# Patient Record
Sex: MALE | Race: WHITE | NOT HISPANIC OR LATINO | Employment: OTHER | URBAN - METROPOLITAN AREA
[De-identification: names, ages, dates, MRNs, and addresses within clinical notes are randomized per-mention and may not be internally consistent; named-entity substitution may affect disease eponyms.]

---

## 2017-04-03 ENCOUNTER — DOCUMENTATION ONLY (OUTPATIENT)
Dept: REHABILITATION | Facility: HOSPITAL | Age: 62
End: 2017-04-03

## 2017-04-03 NOTE — PROGRESS NOTES
Physical Therapy DC     Name: Jose Luis Rendon  [facility] Number: [id number]  Diagnosis: Bell's Palsy, dizziness, Vestibulopathy  Physician: Jet    Treatment Orders: PT Eval and Treat  Past Medical History:   Diagnosis Date    Bell's palsy     Pneumonia     age 5    Psoriasis        Pt was evaluated 8/29/16 and followed for vestibular rehab for 6 visits. Pt was also seeing speech therapy due to [place] palsy and facial weakness.  Pt was scheduled for reassessment following HEP on 1/6/17 cancelled due to insurance authorization and rescheduled for 1/25/17 and this was No show- pt has not contacted to resume or reschedule- pt was scheduled to be discharged at that last visit.       Status per last visit seen 11/30/16:  AWAN Assessment    1. Sitting to Standing   4 - able to stand without using hands and stabilize independently  2. Standing Unsupported   4 - able to stand safely 2 minutes without hold  3. Sitting Unsupported   4 - able to sit safely and securely 2 minutes  4. Standing to Sitting   4 - sits safely with minimal use of hands  5. Pivot Transfer   4 - able to trnasfer safely with minor use of hands  6. Standing with Eyes Closed   4 - albe to stand 10 seconds safely  7. Standing with Feet Together   4 - able to place feet together independently and stand 1 minute safely  8. Reaching Forward with Outstretched Arm   4 - can reach forward confidently 25 cm/10 inches  9. Retrieving Object from Floor   4 - able to  slipper safely and easily  10. Turning to Look Behind   4 - looks behind from both sides and weights shifts well  11. Turning 360 Degrees   4 - able to turn 360 in seconds or less  12. Placing Alternate Foot on Step   4 - able to stand independently safely and complete 8 steps in 20 seconds  13. Standing with One Foot in Front   3 - able to place foot ahead of other independently and hold 30 seconds  14. Standing on One Foot   4 - able to lift leg  independently and hold > 10 seconds  55/56, mild dizziness reported with turns, sway with additional testing of tandem, [place]omberg with eyes closed    Motion Provoked Symptoms  ( Intensity: 0-10 scale 0=no Sx, 10=severe Sx)       Intensity 11/30/16 Duration 11/30/16 Intensity  8/29/16 Duration  8/29/16   Baseline symptoms 2  2-3     Sitting - Supine 5 15 sec 5-6 30sec   Supine - L side 5 20 sec 8 15-20 sec   Supine - R side 4-5  5 sec 5 5 sec   Supine - Sitting 5-6  5 sec 3 5 sec   Left Hallpike nt  3 Negative for vertigo and nystagmus   Left Hallpike - Sitting   5     Right Hallpike   3 Negative for vertigo and nystagmus   Right Hallpike - Sitting   3     Sitting - Nose to Left Knee   nt     Sitting - Erect Left         Sitting - Nose to Right Knee         Sitting - Erect Left         Sitting - Head Rotation   5 15 sec   Sitting - Head flx/ext   5 15 sec   Standing - Turn to Right   mild     Standing - Turn to Left   mild        Still showing slow but consistent changes in balance control and vestibular function- pt also reporting slow but consistent changes in facial nerve return and acoustic nerve for hearing.    Although slow and still reporting some motion provoked symptoms pt is making progress with Vestibular Rehab.  Baseline hovers around 2/10 and does spike with challenges.Pt has been compliant with suggestions and is improving.   FOTO 42/100    STG'S: 4-6 weeks  1. Patient independent in HEP of balance, habituation, head-eye coordination and gaze stabilization.  Exercises to be done Daily. Met with progression  2. Decrease patient's subjective rating of dizziness to a 2 on 0-10 scale) with bed mobility and transfers. - varies may spike but does settle  3. Decrease patient's subjective rating of dizziness to a 2 (on 0-10 scale) or less as baseline. met     LTG'S : 6-12 weeks  1. Patient able to ambulate in community safely without assistive device, on varied terrainwith head movement, without LOB or c/o  dizziness.   2. Patient's subjective rating of dizziness will decreased to 0-2 on 0-10 scale  3. Patient able to perform head motions and amb with turns without LOB or c/o dizziness  4. Reduce risk of falls with [place]a Balance gains by 3-5 points.  5. Pt to be I with self management of condition and progression vestibular program for maintenance.    PLAN  Pt was being followed every 4-6 weeks for updates to vestibular programs.  Pt has not contacted to reschedule or resume care.   Discharge with ongoing HEP

## 2017-09-18 ENCOUNTER — HOSPITAL ENCOUNTER (OUTPATIENT)
Dept: RADIOLOGY | Facility: HOSPITAL | Age: 62
Discharge: HOME | End: 2017-09-18
Attending: FAMILY MEDICINE
Payer: COMMERCIAL

## 2017-09-18 ENCOUNTER — OFFICE VISIT (OUTPATIENT)
Dept: FAMILY MEDICINE | Facility: CLINIC | Age: 62
End: 2017-09-18
Payer: COMMERCIAL

## 2017-09-18 VITALS
OXYGEN SATURATION: 95 % | DIASTOLIC BLOOD PRESSURE: 84 MMHG | TEMPERATURE: 99 F | HEART RATE: 87 BPM | WEIGHT: 211.44 LBS | BODY MASS INDEX: 31.32 KG/M2 | SYSTOLIC BLOOD PRESSURE: 127 MMHG | HEIGHT: 69 IN

## 2017-09-18 DIAGNOSIS — J40 BRONCHITIS: ICD-10-CM

## 2017-09-18 DIAGNOSIS — L40.9 PSORIASIS: ICD-10-CM

## 2017-09-18 DIAGNOSIS — J40 BRONCHITIS: Primary | ICD-10-CM

## 2017-09-18 DIAGNOSIS — J30.2 CHRONIC SEASONAL ALLERGIC RHINITIS DUE TO OTHER ALLERGEN: ICD-10-CM

## 2017-09-18 PROCEDURE — 71020 XR CHEST PA AND LATERAL: CPT | Mod: TC

## 2017-09-18 PROCEDURE — 3008F BODY MASS INDEX DOCD: CPT | Mod: S$GLB,,, | Performed by: FAMILY MEDICINE

## 2017-09-18 PROCEDURE — 96372 THER/PROPH/DIAG INJ SC/IM: CPT | Mod: S$GLB,,, | Performed by: FAMILY MEDICINE

## 2017-09-18 PROCEDURE — 71020 XR CHEST PA AND LATERAL: CPT | Mod: 26,,, | Performed by: RADIOLOGY

## 2017-09-18 PROCEDURE — 99214 OFFICE O/P EST MOD 30 MIN: CPT | Mod: 25,S$GLB,, | Performed by: FAMILY MEDICINE

## 2017-09-18 PROCEDURE — 3074F SYST BP LT 130 MM HG: CPT | Mod: S$GLB,,, | Performed by: FAMILY MEDICINE

## 2017-09-18 PROCEDURE — 3079F DIAST BP 80-89 MM HG: CPT | Mod: S$GLB,,, | Performed by: FAMILY MEDICINE

## 2017-09-18 PROCEDURE — 99999 PR PBB SHADOW E&M-EST. PATIENT-LVL III: CPT | Mod: PBBFAC,,, | Performed by: FAMILY MEDICINE

## 2017-09-18 RX ORDER — TRIAMCINOLONE ACETONIDE 40 MG/ML
80 INJECTION, SUSPENSION INTRA-ARTICULAR; INTRAMUSCULAR
Status: COMPLETED | OUTPATIENT
Start: 2017-09-18 | End: 2017-09-18

## 2017-09-18 RX ORDER — CLOBETASOL PROPIONATE 0.5 MG/G
CREAM TOPICAL 2 TIMES DAILY PRN
Qty: 60 G | Refills: 2 | Status: SHIPPED | OUTPATIENT
Start: 2017-09-18 | End: 2022-01-01

## 2017-09-18 RX ORDER — AZITHROMYCIN 250 MG/1
TABLET, FILM COATED ORAL
Qty: 6 TABLET | Refills: 0 | Status: SHIPPED | OUTPATIENT
Start: 2017-09-18 | End: 2018-04-27

## 2017-09-18 RX ORDER — ALBUTEROL SULFATE 90 UG/1
2 AEROSOL, METERED RESPIRATORY (INHALATION) EVERY 6 HOURS PRN
Qty: 1 EACH | Refills: 2 | Status: SHIPPED | OUTPATIENT
Start: 2017-09-18 | End: 2018-06-26

## 2017-09-18 RX ADMIN — TRIAMCINOLONE ACETONIDE 80 MG: 40 INJECTION, SUSPENSION INTRA-ARTICULAR; INTRAMUSCULAR at 05:09

## 2017-09-18 NOTE — PATIENT INSTRUCTIONS
Start Z pack antibiotic, steroid shot in office.  Albuterol inhaler as needed. Chest xray to rule out pneumonia. ALLEGRA every day for nasal drip and allergy symptoms

## 2017-09-18 NOTE — PROGRESS NOTES
Office Visit    Patient Name: Jose Luis Rendon    : 1955  MRN: [id number]    Subjective:  Jose Luis is a 61 y.o. male who presents today for:    Nasal Congestion and Chest Congestion    62 yo with history of recurrent bronchitis and 2 episodes of previous pneumonia here with cough and congestion over the last week. Has some associated shortness of breath that is mildly worse with recent cough and feels that there is a rattling in his chest.  Has nasal congestion and post nasal drainage.  Had some chills over the weekend but no fever. His wife was sick with similar symptoms. Has not tried anything to help with symptoms.     Past Medical History  Past Medical History:   Diagnosis Date    Bell's palsy     Pneumonia     age 5    Psoriasis        Past Surgical History  Past Surgical History:   Procedure Laterality Date    BACK SURGERY N/A     bulging L5    TONSILLECTOMY         Family History  Family History   Problem Relation Age of Onset    Lung disease Mother     Glaucoma Mother     Cataracts Mother     Bladder Cancer Father     Alzheimer's disease Father     Glaucoma Father     Prostate cancer Father     Rheum arthritis Sister     Lupus Sister     Coronary artery disease Maternal Grandmother      nonpremature    Colon cancer Neg Hx        Social History  Social History     Social History    Marital status:      Spouse name: N/A    Number of children: N/A    Years of education: N/A     Occupational History    Not on file.     Social History Main Topics    Smoking status: Former Smoker     Packs/day: 1.00     Years: 45.00     Quit date: 2015    Smokeless tobacco: Not on file    Alcohol use 0.0 oz/week      Comment: rare    Drug use: No    Sexual activity: Yes     Partners: Female     Other Topics Concern    Not on file     Social History Narrative    Diet: no specific        Exercise: no           Current Medications  Medications reviewed and updated.     Allergies   Review of  "patient's allergies indicates:   Allergen Reactions    Bee sting [allergen ext-venom-honey bee] Anaphylaxis and Swelling       Review of Systems (Pertinent positives)  Review of Systems   Constitutional: Positive for chills. Negative for fever.   HENT: Positive for postnasal drip and sore throat.    Respiratory: Positive for cough and shortness of breath.    Gastrointestinal: Negative for nausea and vomiting.   Allergic/Immunologic: Positive for environmental allergies.       /84 (BP Location: Left arm, Patient Position: Sitting, BP Method: Large (Automatic))   Pulse 87   Temp 98.6 °F (37 °C)   Ht 5' 9" (1.753 m)   Wt 95.9 kg (211 lb 6.7 oz)   SpO2 95%   BMI 31.22 kg/m²     Physical Exam   Constitutional: He is oriented to person, place, and time. He appears well-developed and well-nourished. No distress.   Cardiovascular: Normal rate and regular rhythm.    Pulmonary/Chest: Effort normal. He has wheezes in the right upper field, the right middle field, the right lower field, the left upper field, the left middle field and the left lower field. He has rales in the left lower field.   Neurological: He is alert and oriented to person, place, and time.   Psychiatric: He has a normal mood and affect.   Vitals reviewed.        Assessment/Plan:  Jose Luis Rendon is a 61 y.o. male who presents today for :    Jose Luis was seen today for nasal congestion and chest congestion.    Diagnoses and all orders for this visit:    Bronchitis  Comments:  start z pack and chest xray to rule out pneumonia with recent shortness of breath and lung crackles.  albuterol inhaler as needed in office steroid shot  Orders:  -     azithromycin (ZITHROMAX Z-MICHEL) 250 MG tablet; Take 2 tablets on day one then 1 tablet for 4 more days  -     X-Ray Chest PA And Lateral; Future  -     albuterol 90 mcg/actuation inhaler; Inhale 2 puffs into the lungs every 6 (six) hours as needed for Wheezing or Shortness of Breath.  -     triamcinolone acetonide " "injection 80 mg; Inject 2 mLs (80 mg total) into the muscle one time.    Chronic seasonal allergic rhinitis due to other allergen    Psoriasis  -     clobetasol (TEMOVATE) 0.05 % cream; Apply topically 2 (two) times daily as needed. To inflamed areas of elbows            ICD-10-CM ICD-9-CM    1. Bronchitis J40 490 azithromycin (ZITHROMAX Z-MICHEL) 250 MG tablet      X-Ray Chest PA And Lateral      albuterol 90 mcg/actuation inhaler      triamcinolone acetonide injection 80 mg    start z pack and chest xray to rule out pneumonia with recent shortness of breath and lung crackles.  albuterol inhaler as needed in office steroid shot   2. Chronic seasonal allergic rhinitis due to other allergen J30.2     3. Psoriasis L40.9 696.1 clobetasol (TEMOVATE) 0.05 % cream     Return for return as needed for new concerns-  will call with chest xray results.     Addendum: Chest Xray result " There is minimal left basilar atelectasis.  No evidence of focal consolidative process, pneumothorax, or significant effusion.  Small calcified granuloma seen in the right lower lung zone. "  "

## 2017-09-19 ENCOUNTER — TELEPHONE (OUTPATIENT)
Dept: FAMILY MEDICINE | Facility: CLINIC | Age: 62
End: 2017-09-19

## 2017-09-19 NOTE — TELEPHONE ENCOUNTER
Called patient to review results: no pneumonia on lung xray.  The lung crackles that I heard are likely due to some chronic mild scarring/inflammation that he has as a result of his previous pneumonia episodes. He should take the Zithromax prescribed and used the inhaler as needed, but hopefully he is already starting to feel a little better with the steroid shot he got in clinic for his bronchitis-- bronchitis does not really show up on chest xray.-- Left a message requesting a call back.

## 2017-09-19 NOTE — TELEPHONE ENCOUNTER
Returned patient's call and reviewed Xray results, he verbalized understanding. He also stated the inhaler isn't covered by his insurance. He is going to call the pharmacy and find out which one his plan will cover. He will call us back and let us know.

## 2017-09-19 NOTE — TELEPHONE ENCOUNTER
----- Message from Sonia Valerio sent at 9/19/2017  3:02 PM CDT -----  Contact: [phone]/self  Patient called in returning your call. Please advise.

## 2018-03-23 ENCOUNTER — TELEPHONE (OUTPATIENT)
Dept: ADMINISTRATIVE | Facility: HOSPITAL | Age: 63
End: 2018-03-23

## 2018-03-23 RX ORDER — ASPIRIN 81 MG/1
81 TABLET ORAL DAILY
Refills: 0 | COMMUNITY
Start: 2018-03-23 | End: 2018-04-27 | Stop reason: SDUPTHER

## 2018-03-23 NOTE — TELEPHONE ENCOUNTER
Noted, put asa on his med list. Ok with seeing neuro again per msg. I haven't seen him in about 1.5 years. Would need rpt appt if there is anything he is asking me to evaluate.

## 2018-03-23 NOTE — TELEPHONE ENCOUNTER
Phoned pt to confirm ASA therapy, states takes it, but not regularly--pt then c/o continued sx from an event that happened 2 years ago.  States still having headaches, dizziness, vocal cord problems, and some facial muscle weakness, unable to mow his lawn.  Requested an appt with Dr Galvan.  Appt scheduled to see neurology. Asked that inform you of his continued sx

## 2018-04-04 ENCOUNTER — OFFICE VISIT (OUTPATIENT)
Dept: NEUROLOGY | Facility: CLINIC | Age: 63
End: 2018-04-04
Payer: COMMERCIAL

## 2018-04-04 VITALS
BODY MASS INDEX: 31.9 KG/M2 | WEIGHT: 215.38 LBS | HEART RATE: 91 BPM | HEIGHT: 69 IN | DIASTOLIC BLOOD PRESSURE: 99 MMHG | SYSTOLIC BLOOD PRESSURE: 155 MMHG

## 2018-04-04 DIAGNOSIS — F33.1 MODERATE EPISODE OF RECURRENT MAJOR DEPRESSIVE DISORDER: ICD-10-CM

## 2018-04-04 DIAGNOSIS — H81.10 BENIGN PAROXYSMAL POSITIONAL VERTIGO, UNSPECIFIED LATERALITY: ICD-10-CM

## 2018-04-04 DIAGNOSIS — R42 VERTIGO: Primary | ICD-10-CM

## 2018-04-04 PROCEDURE — 99214 OFFICE O/P EST MOD 30 MIN: CPT | Mod: S$GLB,,, | Performed by: PSYCHIATRY & NEUROLOGY

## 2018-04-04 PROCEDURE — 3080F DIAST BP >= 90 MM HG: CPT | Mod: CPTII,S$GLB,, | Performed by: PSYCHIATRY & NEUROLOGY

## 2018-04-04 PROCEDURE — 99999 PR PBB SHADOW E&M-EST. PATIENT-LVL III: CPT | Mod: PBBFAC,,, | Performed by: PSYCHIATRY & NEUROLOGY

## 2018-04-04 PROCEDURE — 3077F SYST BP >= 140 MM HG: CPT | Mod: CPTII,S$GLB,, | Performed by: PSYCHIATRY & NEUROLOGY

## 2018-04-04 RX ORDER — MECLIZINE HYDROCHLORIDE 25 MG/1
25 TABLET ORAL 3 TIMES DAILY PRN
Qty: 30 TABLET | Refills: 3 | Status: SHIPPED | OUTPATIENT
Start: 2018-04-04 | End: 2020-05-12

## 2018-04-04 NOTE — PATIENT INSTRUCTIONS
Depression Affects Your Mind and Body  Everyone feels sad or blue from time to time for a few days or weeks. Depression is when these feelings don't go away and they interfere with daily life.  Depression is a real illness that can develop at any age. It is one of the most common mental health problems in the [place] Depression makes you feel sad, helpless, and hopeless. It gets in the way of your life and relationships. It inhibits your ability to think and act. But, with help, you can feel better again.     When I was depressed, I felt awful. I was so tired all the time I could hardly think, but at night I couldnt fall asleep. My head hurt. My stomach hurt. I didnt know what was wrong with me.   Depression affects your whole body  Brain chemicals affect your body as well as your mood. So depression may do more than just make you feel low. You may also feel bad physically. Depression can:  · Cause trouble with mental tasks such as remembering, concentrating, or making decisions  · Make you feel nervous and jumpy  · Cause trouble sleeping. Or you may sleep too much  · Change your appetite  · Cause headaches, stomachaches, or other aches and pains  · Drain your body of energy  Depression and other illness  It is common for people who have chronic health problems to also have depression. It can often be hard to tell which one caused the other. A person might become depressed after finding out they have a health problem. But some studies suggest being depressed may make certain health problems more likely. And some depressed people stop taking care of themselves. This may make them more likely to get sick.  Date Last Reviewed: 1/1/2017 © 2000-2017 [employer] [address] [place] [address], [place] [address]. All rights reserved. This information is not intended as a substitute for professional medical care. Always follow your healthcare professional's instructions.

## 2018-04-04 NOTE — PROGRESS NOTES
[facility] NEUROLOGY  [facility] [place]    Date: April 4, 2018   Patient Name: Jose Luis Rendon   MRN: [id number]   PCP: Deric Armstrong  Referring Provider: No ref. provider found    Assessment:      This is Jose Luis Rendon, 62 y.o. 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  Have encouraged patient to resume PT, which he states he will do.  He will continue to use meclizine as needed for symptomatic control.  Spends the majority of the visit discussing the need for the patient to meet with a counselor.  Given his financial concerns, I have provided him with a list of local low and no cost community options for counseling, which the patient states he will look into. He denies SI/HI     Plan:      Problem List Items Addressed This Visit        Psychiatric    Major depressive disorder with current active episode    Current Assessment & Plan     -- provided with list of community resources for counseling            ENT    BPPV (benign paroxysmal positional vertigo)    Current Assessment & Plan     -- resume meclizine PRN  -- re-eval with PT           Other Visit Diagnoses     Vertigo    -  Primary    Relevant Orders    Ambulatory consult to Physical Therapy        I spent a total of 30 minutes in face to face time with the patient, over half of which was spent on counseling and education about the patient's diagnosis and medications.     James Galvan MD  [facility]   Department of Neurology    Patient note was created using Dragon Dictation.  Any errors in syntax or even information may not have been identified and edited on initial review prior to signing this note.  Subjective:        HPI:   Mr. Jose Luis Rendon is a 62 y.o. male who presents with a chief complaint of Bell's palsy and [place] syndrome.  Since the patient's last visit, he continues to endorse intermittent vertigo made worse by looking down and improved by keeping still.  He did not  follow up with PT for vestibular rehabilitation and is no longer using meclizine for symptomatic control.  He additionally spends much of the visit discussing his uncontrolled depression.  He is pre-contemplative about speaking to a therapist, but admits to significant anhedonia, lack of motivation, low energy, and poor mood.    PAST MEDICAL HISTORY:  Past Medical History:   Diagnosis Date    Bell's palsy     Pneumonia     age 5    Psoriasis      PAST SURGICAL HISTORY:  Past Surgical History:   Procedure Laterality Date    BACK SURGERY N/A 1998    bulging L5    TONSILLECTOMY       CURRENT MEDS:  Current Outpatient Prescriptions   Medication Sig Dispense Refill    aspirin (ECOTRIN) 81 MG EC tablet Take 1 tablet (81 mg total) by mouth once daily.  0    bacitracin (AK-TRACIN) ophthalmic ointment   0    albuterol 90 mcg/actuation inhaler Inhale 2 puffs into the lungs every 6 (six) hours as needed for Wheezing or Shortness of Breath. 1 each 2    azithromycin (ZITHROMAX Z-MICHEL) 250 MG tablet Take 2 tablets on day one then 1 tablet for 4 more days 6 tablet 0    citalopram (CELEXA) 10 MG tablet Take 1 tablet (10 mg total) by mouth once daily. 30 tablet 2    clobetasol (TEMOVATE) 0.05 % cream Apply topically 2 (two) times daily as needed. To inflamed areas of elbows 60 g 2    meclizine (ANTIVERT) 25 mg tablet Take 1 tablet (25 mg total) by mouth 3 (three) times daily as needed for Dizziness. 30 tablet 3    pravastatin (PRAVACHOL) 20 MG tablet Take 1 tablet (20 mg total) by mouth once daily. 30 tablet 11    trazodone (DESYREL) 50 MG tablet Take 0.5 tablets (25 mg total) by mouth every evening. 30 tablet 5     No current facility-administered medications for this visit.      ALLERGIES:  Review of patient's allergies indicates:   Allergen Reactions    Bee sting [allergen ext-venom-honey bee] Anaphylaxis and Swelling       FAMILY HISTORY:  Family History   Problem Relation Age of Onset    Lung disease Mother      "Glaucoma Mother     Cataracts Mother     Bladder Cancer Father     Alzheimer's disease Father     Glaucoma Father     Prostate cancer Father     Rheum arthritis Sister     Lupus Sister     Coronary artery disease Maternal Grandmother      nonpremature    Colon cancer Neg Hx        SOCIAL HISTORY:  Social History   Substance Use Topics    Smoking status: Former Smoker     Packs/day: 1.00     Years: 45.00     Quit date: 12/31/2015    Smokeless tobacco: Not on file    Alcohol use 0.0 oz/week      Comment: rare     Review of Systems:  12 review of systems is negative except for the symptoms mentioned in HPI.      Objective:     Vitals:    04/04/18 0940   BP: (!) 155/99   Pulse: 91   Weight: 97.7 kg (215 lb 6.2 oz)   Height: 5' 9" (1.753 m)       General: NAD, well nourished   Eyes: mild tearing of R eye, discharge, no erythema   ENT: moist mucous membranes of the oral cavity, nares patent, positive Sherry-Hallpike to right  Neck: Supple, Full range of motion  Cardiovascular: Warm and well perfused, pulses equal and symmetrical  Lungs: Normal work of breathing, normal chest wall excursions  Skin: No rash, lesions, or breakdown   Psychiatry: Mood and affect are restricted and depressed  Abdomen: soft, non tender, non distended  Extremeties: No cyanosis, clubbing or edema.    Neurological   MENTAL STATUS: Alert and oriented to person, place, and time. Attention and concentration within normal limits. Speech without dysarthria, able to name and repeat without difficulty.   CRANIAL NERVES: Visual fields intact. PERRL. EOMI. Facial sensation intact. Face with right LMN pattern droop, blink now intact on R with some synkinesis Hearing mildly reduced on R. Full shoulder shrug bilaterally. Tongue protrudes midline   SENSORY: Sensation is intact to light touch throughout.    MOTOR: Normal bulk and tone.  5/5 deltoid, biceps, triceps, hand  bilaterally. 5/5 iliopsoas, knee extension/flexion, foot dorsi/plantarflexion " bilaterally.    CEREBELLAR/COORDINATION/GAIT: Gait steady with normal arm swing and stride length. Normal rapid alternating movements.

## 2018-04-18 ENCOUNTER — CLINICAL SUPPORT (OUTPATIENT)
Dept: REHABILITATION | Facility: HOSPITAL | Age: 63
End: 2018-04-18
Attending: PSYCHIATRY & NEUROLOGY
Payer: COMMERCIAL

## 2018-04-18 DIAGNOSIS — R42 CHRONIC VERTIGO: ICD-10-CM

## 2018-04-18 PROCEDURE — G8979 MOBILITY GOAL STATUS: HCPCS | Mod: CJ,PO

## 2018-04-18 PROCEDURE — G8978 MOBILITY CURRENT STATUS: HCPCS | Mod: CL,PO

## 2018-04-18 PROCEDURE — 97162 PT EVAL MOD COMPLEX 30 MIN: CPT | Mod: PO

## 2018-04-18 PROCEDURE — 97112 NEUROMUSCULAR REEDUCATION: CPT | Mod: PO

## 2018-04-18 NOTE — PLAN OF CARE
PHYSICAL THERAPY INITIAL EVALUATION     Date: 04/18/2018  Name: Jose Luis Rendon  [facility] Number: [id number]    Visit #: 1   Start Time:  900  Stop Time:  1015  Time in treatment: 75 minutes    Orders:    By Location/POS By Department    none James Galvan MD [facility] NEUROLOGY   Priority Start Date Expiration Date Referral Entered By   Routine 04/11/2018 12/31/2018 James Galvan MD   Visits Requested Visits Authorized Visits Completed Visits Scheduled   1 20 1 0   Procedure Information     Diagnosis   R42 (ICD-10-CM) - Vertigo         History   History of Present Illness: pt relates that April 2016 he awakened one day with a severe pain in the right ear.  A few days later he developed a right facial droop.  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  He also relates he gets LEMUS even with talking.    He is also has hypersensitivity to certain noise frequency.  He lost his job May 6, 2016.  He then started to have severe anxiety    Treatment Diagnosis:   1. Chronic vertigo         Past Medical History      Past Medical History:   Diagnosis Date    Bell's palsy     Pneumonia     age 5    Psoriasis        Allergies  Review of patient's allergies indicates:   Allergen Reactions    Bee sting [allergen ext-venom-honey bee] Anaphylaxis and Swelling       Current Medication list:   Current Outpatient Prescriptions on File Prior to Visit   Medication Sig Dispense Refill    albuterol 90 mcg/actuation inhaler Inhale 2 puffs into the lungs every 6 (six) hours as needed for Wheezing or Shortness of Breath. 1 each 2    aspirin (ECOTRIN) 81 MG EC tablet Take 1 tablet (81 mg total) by mouth once daily.  0    azithromycin (ZITHROMAX Z-MICHEL) 250 MG tablet Take 2 tablets on day one then 1 tablet for 4 more days 6 tablet 0    bacitracin (AK-TRACIN) ophthalmic ointment   0    citalopram (CELEXA) 10 MG tablet Take 1 tablet (10 mg total) by mouth once daily. 30 tablet  "2    clobetasol (TEMOVATE) 0.05 % cream Apply topically 2 (two) times daily as needed. To inflamed areas of elbows 60 g 2    meclizine (ANTIVERT) 25 mg tablet Take 1 tablet (25 mg total) by mouth 3 (three) times daily as needed for Dizziness. 30 tablet 3    pravastatin (PRAVACHOL) 20 MG tablet Take 1 tablet (20 mg total) by mouth once daily. 30 tablet 11    trazodone (DESYREL) 50 MG tablet Take 0.5 tablets (25 mg total) by mouth every evening. 30 tablet 5     No current facility-administered medications on file prior to visit.        Precautions: Standard, Fall , [place]    Prior Therapy: not in a few years but did have some for BPPV in 2016    Diagnostic Tests: MRI 2016 Impression         Small vessel disease without evidence of acute ischemic changes.  Old periventricular lacunar infarct on the right. Fluid identified in the mastoid region on the right.  No evidence of a vestibular schwannoma.  Small bilateral hippocampal cyst.         Living situation:   Work status: forced snf due to illness  Stairs are very problematic due to vertigo  Sports/Recreational Activities: no  Assistive devices/equipment no    Cultural, Spiritual, Developmental and Educational concerns: none  Abuse/Neglect, Nutritional concerns: none     Patient Therapy Goals: not be as dizzy    Subjective   Jose Luis Rendon states he has a hard time sleeping due to anxiety.  " Feels like brain is sloshing in the head" Feels like he is spinning  Current vertigo level: 0 sitting  At worst  7/10  How long has vertigo/dizziness been present ?  Vertigo began in 2016 when he had the shingles.  It has gotten worse since then.    What provokes it? Carrying anything heavy, sudden starts and stops, riding on bumpy road, moving eyes, head movement, body movement  What relieves it?  Sitting still  How long to episodes last?  Minutes to 30 minutes  Do you have headaches?  Occasionally  Occurs if there is pressure is placed on the posterior skull         "      Are they associated with the dizziness?  Not necessarily  Any visual problems other than wearing glasses? Right eye droop and dry eyes  Any loss of hearing: bilateral       Any tinnitus?  R  FALLS: How many in the last year?       no                  Does darkness effect dizziness:  yes  Do you get dizzy reading?  Somewhat due to eye movement             Using the computer?   yes                  Driving?  no  PAIN none currently      Objective   APPEARANCE UPON ARRIVAL:  62 y.o. White  male very anxious, right facial droop.  Hoarse voice, SOB  MENTATION:  AOC    POSTURE examination in standing:  normal    GAIT:  No deviation on straight path    CERVICAL ROM   Normal  Increases vertigo with cervical extension  OCULOMOTOR  Smooth Pursuit:  normal  Saccadic eye movements:  perhaps a slight right eye horizontal nystagmus with horizontal saccades.  Vertical saccades created dizziness no systagmus  Convergence/divergence:  normal  Vestibular Ocular Reflex: normal without nystagmus but c/o dizziness*  Gaze nystagmus   normal      VESTIBULAR  Head impulse    Dizziness no nystagmus  Head shake test  Dizziness no nystagmus  [place]- Hallpike:  Essentially normal bilaterally    CEREBELLAR SCREENS/ COORDINATION:   Heel to shin  RLE: normal   Heel to shin  LLE: normal   Rapid alternating movement RLE:normal  Rapid alternating movement LLE: normal   Rapid alternating movemen LUE: normal   Rapid alternating movement RUE: normal     Endurance Deficit:very LEMUS and  at rest-anxious    Treatment   EVALUATION COMPLETED  NEUROMUSCULAR RE-EDUCATION AND BALANCE COORDINATION TRAINING for  10 minutes to include:  Oculomotor exercises  Saccades :  Horizontal saccades one minute 3 times a day                      Vertical saccades one minute 3 times a day  Vestibular ocular reflex                      Horizontal one minute 3 times a day  Education   Patient was provided with a written copy of the above home exercises indicated in bold to  perform as tolerated   Exercises were reviewed and patient was able to demonstrate them prior to the end of the session.  Pt was instructed in ways to improve safety of home environment to prevent falls  All of the patients questions were answered  Goals of therapy, the roles of PT and PTA, and the need for compliance of appointments, attendance policy and HEP was discussed with patient .  Patient expressed understanding and agreement with above education.      No barriers to learning or social/cultural issues were identified that could hinder therapy.    Assessment   Pt with chronic debilitating vertigo which has been present for 2 years.  He has what appears to be a peripheral uncompensated right vestibulopathy.  He will benefit from oculomotor exercises and balance exercises to retrain to improve overall balance and decrease sense of imbalance.  Pt presents with  evolving clinical presentation with changing clinical characteristics which include: difficulty with ADLs, employment, home life, leisure pursuits due to chronic vertigo        Medical necessity is demonstrated by the following IMPAIRMENTS/PROBLEM LIST:  1. Fall Risk - impaired balance   2. Unable to participate in daily activities   3  generalized weakness  4. Decreased CV endurance   5. Decreased community ambulation   6 oculomotor dysfunction    Anticipated barriers to physical therapy: anxiety    G Code   CMS Impairment/Limitation/Restriction for FOTO Vertigo Survey  Status Limitation G-Code CMS Severity Modifier  Intake 40% 60% Current Status CL - At least 60 percent but less than 80 percent  Predicted 63% 37% Goal Status+ CJ - At least 20 percent but less than 40 percent  D/C Status CL **only report if this is a one time visit    Pt's spiritual, cultural and educational needs considered and pt agreeable to plan of care and goals as stated below:       Goals     Short term goals: 8 weeks, pt agrees to goals set.  Pt and caregivers will evaluate  home for safety, including checking lighting and hazards on the floor such as rugs or cords and remove them.  Pt will tolerate 50 minutes of physical therapy treatment with 1 rest break to demonstrate overall improvement in CV endurance              Pt will report one degree less of vertigo and decreased frequency of vertigo 5/7 days.              Pt will report improved ability to work at his computer/spreadsheets    Rehab Potential: fair provided patient is compliant with HEP and PT appointments.      Plan   Pt will be seen 1-2 times per week for 8 weeks. Recommended Treatment Plan will include:  Manual soft tissue and/or joint mobilization  Therapeutic Exercise  Neuromuscular re-education          Individualized Home Exercise Program  Modalities: heat/ice, estim, US as needed         Pt education    PTA may be involved in patient's care as part of the Rehab Team.      History  Co-morbidities and personal factors that may impact the plan of care Examination  Body Structures and Functions, activity limitations and participation restrictions that may impact the plan of care Clinical Presentation   Co-morbidities: bells palsy with ey droop, anxiety, depression        Personal Factors:   coping style Body Regions: head, visual, vestibular, balance        Participation Restrictions:   anxiety     Activity limitations/ impairments:   Learning and applying knowledge  no deficits  General Tasks and Commands  no deficits  Communicationno deficits  Mobility:  Safe transitional movement/ ambulation impaired  Self care  no deficits  Domestic Life  Unable to participate fully due to fear of falling/ high fall potential  Interactions/Relationships:  No deficits   Community and Social Life:     Unable to participate fully due to    fear of falling/ high fall potential           evolving clinical presentation with changing clinical characteristics                      moderate   high high Decision Making/ Complexity  Score:  moderate     Pilar Corona, PT

## 2018-04-18 NOTE — PROGRESS NOTES
PHYSICAL THERAPY INITIAL EVALUATION     Date: 04/18/2018  Name: Jose Luis Rendon  [facility] Number: [id number]    Visit #: 1   Start Time:  900  Stop Time:  1015  Time in treatment: 75 minutes    Orders:    By Location/POS By Department    none James Galvan MD [facility] NEUROLOGY   Priority Start Date Expiration Date Referral Entered By   Routine 04/11/2018 12/31/2018 James Galvan MD   Visits Requested Visits Authorized Visits Completed Visits Scheduled   1 20 1 0   Procedure Information     Diagnosis   R42 (ICD-10-CM) - Vertigo         History   History of Present Illness: pt relates that April 2016 he awakened one day with a severe pain in the right ear.  A few days later he developed a right facial droop.  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  He also relates he gets LEMUS even with talking.    He is also has hypersensitivity to certain noise frequency.  He lost his job May 6, 2016.  He then started to have severe anxiety    Treatment Diagnosis:   1. Chronic vertigo         Past Medical History      Past Medical History:   Diagnosis Date    Bell's palsy     Pneumonia     age 5    Psoriasis        Allergies  Review of patient's allergies indicates:   Allergen Reactions    Bee sting [allergen ext-venom-honey bee] Anaphylaxis and Swelling       Current Medication list:   Current Outpatient Prescriptions on File Prior to Visit   Medication Sig Dispense Refill    albuterol 90 mcg/actuation inhaler Inhale 2 puffs into the lungs every 6 (six) hours as needed for Wheezing or Shortness of Breath. 1 each 2    aspirin (ECOTRIN) 81 MG EC tablet Take 1 tablet (81 mg total) by mouth once daily.  0    azithromycin (ZITHROMAX Z-MICHEL) 250 MG tablet Take 2 tablets on day one then 1 tablet for 4 more days 6 tablet 0    bacitracin (AK-TRACIN) ophthalmic ointment   0    citalopram (CELEXA) 10 MG tablet Take 1 tablet (10 mg total) by mouth once daily. 30 tablet  "2    clobetasol (TEMOVATE) 0.05 % cream Apply topically 2 (two) times daily as needed. To inflamed areas of elbows 60 g 2    meclizine (ANTIVERT) 25 mg tablet Take 1 tablet (25 mg total) by mouth 3 (three) times daily as needed for Dizziness. 30 tablet 3    pravastatin (PRAVACHOL) 20 MG tablet Take 1 tablet (20 mg total) by mouth once daily. 30 tablet 11    trazodone (DESYREL) 50 MG tablet Take 0.5 tablets (25 mg total) by mouth every evening. 30 tablet 5     No current facility-administered medications on file prior to visit.        Precautions: Standard, Fall , [place]    Prior Therapy: not in a few years but did have some for BPPV in 2016    Diagnostic Tests: MRI 2016 Impression         Small vessel disease without evidence of acute ischemic changes.  Old periventricular lacunar infarct on the right. Fluid identified in the mastoid region on the right.  No evidence of a vestibular schwannoma.  Small bilateral hippocampal cyst.         Living situation:   Work status: forced half-way due to illness  Stairs are very problematic due to vertigo  Sports/Recreational Activities: no  Assistive devices/equipment no    Cultural, Spiritual, Developmental and Educational concerns: none  Abuse/Neglect, Nutritional concerns: none     Patient Therapy Goals: not be as dizzy    Subjective   Jose Luis Rendon states he has a hard time sleeping due to anxiety.  " Feels like brain is sloshing in the head" Feels like he is spinning  Current vertigo level: 0 sitting  At worst  7/10  How long has vertigo/dizziness been present ?  Vertigo began in 2016 when he had the shingles.  It has gotten worse since then.    What provokes it? Carrying anything heavy, sudden starts and stops, riding on bumpy road, moving eyes, head movement, body movement  What relieves it?  Sitting still  How long to episodes last?  Minutes to 30 minutes  Do you have headaches?  Occasionally  Occurs if there is pressure is placed on the posterior skull         "      Are they associated with the dizziness?  Not necessarily  Any visual problems other than wearing glasses? Right eye droop and dry eyes  Any loss of hearing: bilateral       Any tinnitus?  R  FALLS: How many in the last year?       no                  Does darkness effect dizziness:  yes  Do you get dizzy reading?  Somewhat due to eye movement             Using the computer?   yes                  Driving?  no  PAIN none currently      Objective   APPEARANCE UPON ARRIVAL:  62 y.o. White  male very anxious, right facial droop.  Hoarse voice, SOB  MENTATION:  AOC    POSTURE examination in standing:  normal    GAIT:  No deviation on straight path    CERVICAL ROM   Normal  Increases vertigo with cervical extension  OCULOMOTOR  Smooth Pursuit:  normal  Saccadic eye movements:  perhaps a slight right eye horizontal nystagmus with horizontal saccades.  Vertical saccades created dizziness no systagmus  Convergence/divergence:  normal  Vestibular Ocular Reflex: normal without nystagmus but c/o dizziness*  Gaze nystagmus   normal      VESTIBULAR  Head impulse    Dizziness no nystagmus  Head shake test  Dizziness no nystagmus  [place]- Hallpike:  Essentially normal bilaterally    CEREBELLAR SCREENS/ COORDINATION:   Heel to shin  RLE: normal   Heel to shin  LLE: normal   Rapid alternating movement RLE:normal  Rapid alternating movement LLE: normal   Rapid alternating movemen LUE: normal   Rapid alternating movement RUE: normal     Endurance Deficit:very LEMUS and  at rest-anxious    Treatment   EVALUATION COMPLETED  NEUROMUSCULAR RE-EDUCATION AND BALANCE COORDINATION TRAINING for  10 minutes to include:  Oculomotor exercises  Saccades :  Horizontal saccades one minute 3 times a day                      Vertical saccades one minute 3 times a day  Vestibular ocular reflex                      Horizontal one minute 3 times a day  Education   Patient was provided with a written copy of the above home exercises indicated in bold to  perform as tolerated   Exercises were reviewed and patient was able to demonstrate them prior to the end of the session.  Pt was instructed in ways to improve safety of home environment to prevent falls  All of the patients questions were answered  Goals of therapy, the roles of PT and PTA, and the need for compliance of appointments, attendance policy and HEP was discussed with patient .  Patient expressed understanding and agreement with above education.      No barriers to learning or social/cultural issues were identified that could hinder therapy.    Assessment   Pt with chronic debilitating vertigo which has been present for 2 years.  He has what appears to be a peripheral uncompensated right vestibulopathy.  He will benefit from oculomotor exercises and balance exercises to retrain to improve overall balance and decrease sense of imbalance.  Pt presents with  evolving clinical presentation with changing clinical characteristics which include: difficulty with ADLs, employment, home life, leisure pursuits due to chronic vertigo        Medical necessity is demonstrated by the following IMPAIRMENTS/PROBLEM LIST:  1. Fall Risk - impaired balance   2. Unable to participate in daily activities   3  generalized weakness  4. Decreased CV endurance   5. Decreased community ambulation   6 oculomotor dysfunction    Anticipated barriers to physical therapy: anxiety    G Code   CMS Impairment/Limitation/Restriction for FOTO Vertigo Survey  Status Limitation G-Code CMS Severity Modifier  Intake 40% 60% Current Status CL - At least 60 percent but less than 80 percent  Predicted 63% 37% Goal Status+ CJ - At least 20 percent but less than 40 percent  D/C Status CL **only report if this is a one time visit    Pt's spiritual, cultural and educational needs considered and pt agreeable to plan of care and goals as stated below:       Goals     Short term goals: 8 weeks, pt agrees to goals set.  Pt and caregivers will evaluate  home for safety, including checking lighting and hazards on the floor such as rugs or cords and remove them.  Pt will tolerate 50 minutes of physical therapy treatment with 1 rest break to demonstrate overall improvement in CV endurance              Pt will report one degree less of vertigo and decreased frequency of vertigo 5/7 days.              Pt will report improved ability to work at his computer/spreadsheets    Rehab Potential: fair provided patient is compliant with HEP and PT appointments.      Plan   Pt will be seen 1-2 times per week for 8 weeks. Recommended Treatment Plan will include:  Manual soft tissue and/or joint mobilization  Therapeutic Exercise  Neuromuscular re-education          Individualized Home Exercise Program  Modalities: heat/ice, estim, US as needed         Pt education    PTA may be involved in patient's care as part of the Rehab Team.      History  Co-morbidities and personal factors that may impact the plan of care Examination  Body Structures and Functions, activity limitations and participation restrictions that may impact the plan of care Clinical Presentation   Co-morbidities: bells palsy with ey droop, anxiety, depression        Personal Factors:   coping style Body Regions: head, visual, vestibular, balance        Participation Restrictions:   anxiety     Activity limitations/ impairments:   Learning and applying knowledge  no deficits  General Tasks and Commands  no deficits  Communicationno deficits  Mobility:  Safe transitional movement/ ambulation impaired  Self care  no deficits  Domestic Life  Unable to participate fully due to fear of falling/ high fall potential  Interactions/Relationships:  No deficits   Community and Social Life:     Unable to participate fully due to    fear of falling/ high fall potential           evolving clinical presentation with changing clinical characteristics                      moderate   high high Decision Making/ Complexity  Score:  moderate     Pilar Corona, PT

## 2018-04-27 ENCOUNTER — OFFICE VISIT (OUTPATIENT)
Dept: FAMILY MEDICINE | Facility: CLINIC | Age: 63
End: 2018-04-27
Payer: COMMERCIAL

## 2018-04-27 VITALS
HEART RATE: 87 BPM | TEMPERATURE: 98 F | BODY MASS INDEX: 32.13 KG/M2 | OXYGEN SATURATION: 95 % | WEIGHT: 216.94 LBS | HEIGHT: 69 IN | DIASTOLIC BLOOD PRESSURE: 94 MMHG | SYSTOLIC BLOOD PRESSURE: 160 MMHG

## 2018-04-27 DIAGNOSIS — R06.09 DOE (DYSPNEA ON EXERTION): ICD-10-CM

## 2018-04-27 DIAGNOSIS — M54.50 ACUTE BILATERAL LOW BACK PAIN WITHOUT SCIATICA: ICD-10-CM

## 2018-04-27 DIAGNOSIS — B02.21 RAMSAY HUNT SYNDROME (GENICULATE HERPES ZOSTER): ICD-10-CM

## 2018-04-27 DIAGNOSIS — L40.9 PSORIASIS: ICD-10-CM

## 2018-04-27 DIAGNOSIS — I10 ESSENTIAL HYPERTENSION: Primary | ICD-10-CM

## 2018-04-27 DIAGNOSIS — G51.0 BELL'S PALSY: ICD-10-CM

## 2018-04-27 DIAGNOSIS — I10 BENIGN ESSENTIAL HTN: ICD-10-CM

## 2018-04-27 DIAGNOSIS — Z86.73 OLD LACUNAR STROKE WITHOUT LATE EFFECT: ICD-10-CM

## 2018-04-27 DIAGNOSIS — R42 CHRONIC VERTIGO: ICD-10-CM

## 2018-04-27 DIAGNOSIS — F41.9 ANXIETY: ICD-10-CM

## 2018-04-27 PROCEDURE — 3074F SYST BP LT 130 MM HG: CPT | Mod: CPTII,S$GLB,, | Performed by: FAMILY MEDICINE

## 2018-04-27 PROCEDURE — 99215 OFFICE O/P EST HI 40 MIN: CPT | Mod: S$GLB,,, | Performed by: FAMILY MEDICINE

## 2018-04-27 PROCEDURE — 99999 PR PBB SHADOW E&M-EST. PATIENT-LVL IV: CPT | Mod: PBBFAC,,, | Performed by: FAMILY MEDICINE

## 2018-04-27 PROCEDURE — 3080F DIAST BP >= 90 MM HG: CPT | Mod: CPTII,S$GLB,, | Performed by: FAMILY MEDICINE

## 2018-04-27 RX ORDER — ASPIRIN 81 MG/1
81 TABLET ORAL DAILY
Refills: 0 | COMMUNITY
Start: 2018-04-27 | End: 2022-01-01 | Stop reason: SDUPTHER

## 2018-04-27 RX ORDER — AMLODIPINE BESYLATE 5 MG/1
5 TABLET ORAL DAILY
Qty: 30 TABLET | Refills: 11 | Status: SHIPPED | OUTPATIENT
Start: 2018-04-27 | End: 2019-05-06 | Stop reason: SDUPTHER

## 2018-04-27 RX ORDER — PRAVASTATIN SODIUM 20 MG/1
20 TABLET ORAL DAILY
Qty: 30 TABLET | Refills: 11 | Status: SHIPPED | OUTPATIENT
Start: 2018-04-27 | End: 2019-05-06 | Stop reason: SDUPTHER

## 2018-04-27 NOTE — PROGRESS NOTES
"(Portions of this note were dictated using voice recognition software and may contain dictation related errors in spelling/grammar/syntax not found on text review)    CC:   Chief Complaint   Patient presents with    Dizziness    Back Pain    Medication Management     wants to discuss inhalers.  Also states Antivert made him "more dizzy"       HPI: 62 y.o. male Last seen by me in November 2016.   Several concerns:    Bell's palsy in 2016 with consideration of Ivania White syndrome..  Workup had demonstrated no acute stroke that there was a remote lacunar infarct on the right.  Was started on pravastatin but had admitted at that visit that he was not regularly taking this.  Counseling provided at that visit.  He still mentions that he never really took the pravastatin regularly.  Supposed to be on aspirin but not currently taking this.  Sometimes get some bruising when his dog scratches him.  No other abnormal bleeding    Continues to get vertigo issues.  Visit with neurology, note appreciated.  Was referred to physical therapy to help with his vertigo.  Was advised meclizine as needed.  Finds sometimes when he takes a 25 mg meclizine he almost feels more dizzy.    Has significant anxiety and insomnia resulting from this diagnosis and resulting loss of work.  Started on trazodone at bedtime and Celexa during the day.  Not currently taking these.  Does get frustrated about his current predicament.  He states he used to be very active with work and now is not working, he is much more sedentary.  He does have feelings of guilt and has difficulty coming to terms with all the events that lead him to lose his job.  Financial difficulties are also apparent because of this.  His neurologist had recommended some low cost counseling solutions but he has not pursued this yet.    Hypertension diagnosed at last visit based on multiple elevated blood pressures.  He had declined on pharmacotherapy initiation but we discussed " dietary/lifestyle modification first.  Blood pressure remains elevated today     A week ago was helping somebody with some heavy lifting and had pain in his mid low back.  No radiation to his legs.  No chronic back pains.  Was taking some Advil occasionally.  No numbness or tingling    He has gained weight since being less active.  Incidentally mentions that sometimes when he does any level of exertion he will feel short-winded and needs to stop for a few minutes.  Denies any chest pain    Past Medical History:   Diagnosis Date    Anxiety 8/5/2016    Bell's palsy     Benign essential HTN 4/27/2018    Lacunar infarction     Remote finding seen on cranial imaging during acute workup for Bell's palsy/[place]    Pneumonia     age 5    Psoriasis        Past Surgical History:   Procedure Laterality Date    BACK SURGERY N/A 1998    bulging L5    TONSILLECTOMY         Family History   Problem Relation Age of Onset    Lung disease Mother     Glaucoma Mother     Cataracts Mother     Bladder Cancer Father     Alzheimer's disease Father     Glaucoma Father     Prostate cancer Father     Rheum arthritis Sister     Lupus Sister     Coronary artery disease Maternal Grandmother      nonpremature    Colon cancer Neg Hx        Social History     Social History    Marital status:      Spouse name: N/A    Number of children: N/A    Years of education: N/A     Occupational History    Not on file.     Social History Main Topics    Smoking status: Former Smoker     Packs/day: 1.00     Years: 45.00     Quit date: 12/31/2015    Smokeless tobacco: Not on file    Alcohol use 0.0 oz/week      Comment: rare    Drug use: No    Sexual activity: Yes     Partners: Female     Other Topics Concern    Not on file     Social History Narrative    Diet: no specific        Exercise: no         Immunizations:  Tdap 4/15/16     Age/Gender Appropriate screenings:  Colonoscopy: hasn't had  Prostate, April 2016    Lab  Results   Component Value Date    WBC 8.58 05/12/2016    HGB 16.7 05/12/2016    HCT 48.8 05/12/2016     05/12/2016    CHOL 168 04/15/2016    TRIG 68 04/15/2016    HDL 42 04/15/2016    ALT 21 05/12/2016    AST 16 05/12/2016     05/12/2016    K 4.0 05/12/2016     05/12/2016    CREATININE 1.0 05/12/2016    CALCIUM 9.2 05/12/2016    BUN 15 05/12/2016    CO2 21 (L) 05/12/2016    TSH 0.988 04/15/2016    PSA 0.22 04/15/2016    LDLCALC 112.4 04/15/2016     (H) 05/12/2016         Review of systems    GENERAL: Fatigue, malaise  SKIN: No rashes, no itching.  HEAD: No headaches.  EYES: Still with right-sided ptosis  EARS: No ear pain or changes in hearing.  NOSE: No congestion or rhinorrhea.  MOUTH & THROAT: Still with some chronic right-sided facial weakness.  NODES: Denies swollen glands.  CHEST: Above.  CARDIOVASCULAR: Denies chest pain, PND, orthopnea.  ABDOMEN: No nausea, vomiting, or changes in bowel function.  URINARY: No flank pain, dysuria or hematuria.  PERIPHERAL VASCULAR: No claudication or cyanosis.  MUSCULOSKELETAL: Above..  NEUROLOGIC: Above          Vital signs reviewed  PE:   APPEARANCE: Well nourished, well developed, in no acute distress.    HEAD: Normocephalic, atraumatic.  EYES: PERRL. EOMI.   Conjunctivae noninjected.  Right-sided ptosis, chronic  EARS: TM's intact. Light reflex normal. No retraction or perforation.    NOSE: Mucosa pink. Airway clear.  MOUTH & THROAT: No tonsillar enlargement. No pharyngeal erythema or exudate.  Right-sided facial weakness, chronic  NECK: Supple with no cervical lymphadenopathy.  No carotid bruits.  No thyromegaly  CHEST: Good inspiratory effort. Lungs clear to auscultation with no wheezes or crackles.  CARDIOVASCULAR: Normal S1, S2. No rubs, murmurs, or gallops.  ABDOMEN: Bowel sounds normal. Not distended. Soft. No tenderness or masses. No organomegaly.  EXTREMITIES: No edema  BACK: No significant midline lumbar tenderness to palpation.  No  significant paralumbar tenderness to palpation.  Negative straight leg raise bilaterally.  Normal patellar and ankle reflexes bilaterally  PSYCHIATRIC: Pleasant demeanor.  Does appear frustrated when discussing his health issues during the office visit.  Also reports short-term memory difficulties    IMPRESSION  1. Essential hypertension    2. Benign essential HTN    3. Old lacunar stroke without late effect    4. Bell's palsy    5. Anxiety    6. 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 Psoriasis    9. Acute bilateral low back pain without sciatica    10. LEMUS (dyspnea on exertion)        PLAN  40 minute visit greater than 50% counseling    Hypertension not controlled.  Start amlodipine 5 mg a day.  Encourage less so modification, sodium reduction, exercise to help with weight loss    Old lacunar infarct, unknown timeframe.  Advise starting back on aspirin 81 mg daily.  Advise starting back on pravastatin 20 mg daily.  Recheck labs in 3 months after starting the above therapy    Chronic vertigo following Live's palsy/[place] White syndrome.  Reviewed neurology documentation.  Do agree with pursuing physical therapy to help with his balance and vertigo issues.  He can take half a meclizine as needed in case he is feeling some sedative effects from the full dose    Low back pain: Local heat, avoid NSAIDs secondary to hypertension at this time but can take Tylenol.  Lumbar stretches provided    Anxiety associated with health and finances: Strongly recommend talk therapy as recommended by his neurologist.  He has resources prescribed from his neurologist to look into with this.  Did emphasize the proper mental health will help with better physical health    Dyspnea on exertion.  He has gained weight and is quite sedentary since difficult to assess whether symptom is more deconditioning process or separately related to cardiogenic process.  He does have some risk factors including his  atherosclerotic disease given his lacunar infarct, hypertension, noncompliance with aspirin and statin, and a family history of heart disease.  I offered to cardiogenic workup including an EKG and a stress test although patient is very concerned about financial aspects of this and the more we discussed that seems to get more frustrated about workup into this.  I have offered to have him try to gradually increase his physical activity and see if some of the dyspnea symptoms do improve which we discussed would be more a sign of deconditioning versus worsening of his dyspnea symptoms with increasing activity which would eat more concerning for potential cardiogenic or pulmonary etiology.  He is willing to do the above and consider perhaps further workup if significant worsening of symptoms with increasing activity    We discussed returning in the next 2 months for his wellness exam, and check of his labs post statin initiation along with other health screening discussion

## 2018-04-27 NOTE — PATIENT INSTRUCTIONS
LOW BACK PAIN EXERCISES    Exercises that stretch and strengthen the muscles of your abdomen and spine can help prevent back problems. Strong back and abdominal muscles help you keep good posture, with your spine in its correct position.  If your muscles are tight, take a warm shower or bath before doing the exercises. Exercise on a rug or mat. Wear loose clothing. Dont wear shoes. Stop doing any exercise that causes pain until you have talked with your healthcare provider.  Ask your provider or physical therapist to help you develop an exercise program. Ask your provider how many times a week you need to do the exercises. Remember to start slowly.   Exercises  These exercises are intended only as suggestions. Be sure to check with your provider before starting the exercises.   Standing hamstring stretch: Put the heel of one leg on a stool about 15 inches high. Keep your leg straight. Lean forward, bending at the hips until you feel a mild stretch in the back of your thigh. Make sure you do not roll your shoulders or bend at the waist when doing this. You want to stretch your leg, not your lower back. Hold the stretch for 15 to 30 seconds. Repeat with each leg 3 times.   Cat and camel: Get down on your hands and knees. Let your stomach sag, allowing your back to curve downward. Hold this position for 5 seconds. Then arch your back and hold for 5 seconds. Do 2 sets of 15.   Quadruped arm and leg raise: Get down on your hands and knees. Pull in your belly button and tighten your abdominal muscles to stiffen your spine. While keeping your abdominals tight, raise one arm and the opposite leg away from you. Hold this position for 5 seconds. Lower your arm and leg slowly and change sides. Do this 10 times on each side.   Pelvic tilt: Lie on your back with your knees bent and your feet flat on the floor. Pull your belly button in towards your spine and push your lower back into the floor, flattening your back. Hold this  position for 15 seconds, then relax. Repeat 5 to 10 times.   Partial curl: Lie on your back with your knees bent and your feet flat on the floor. Draw in your abdomen and tighten your stomach muscles. With your hands stretched out in front of you, curl your upper body forward until your shoulders clear the floor. Hold this position for 3 seconds. Don't hold your breath. It helps to breathe out as you lift your shoulders. Relax back to the floor. Repeat 10 times. Build to 2 sets of 15. To challenge yourself, clasp your hands behind your head and keep your elbows out to your sides.   Gluteal stretch: Lie on your back with both knees bent. Rest your right ankle over the knee of your left leg. Grasp the thigh of the left leg and pull toward your chest. You will feel a stretch along the buttocks and possibly along the outside of your hip. Hold the stretch for 15 to 30 seconds. Then repeat the exercise with your left ankle over your right knee. Do the exercise 3 times with each leg.   Extension exercise   Lie face down on the floor for 5 minutes. If this hurts too much, lie face down with a pillow under your stomach. This should relieve your leg or back pain. When you can lie on your stomach for 5 minutes without a pillow, you can continue with Part B of this exercise.   After lying on your stomach for 5 minutes, prop yourself up on your elbows for another 5 minutes. If you can do this without having more leg or buttock pain, you can start doing part C of this exercise.   Lie on your stomach with your hands under your shoulders. Then press down on your hands and extend your elbows while keeping your hips flat on the floor. Hold for 1 second and lower yourself to the floor. Do 3 to 5 sets of 10 repetitions. Rest for 1 minute between sets. You should have no pain in your legs when you do this, but it is normal to feel some pain in your lower back.   Do this exercise several times a day.  Side plank: Lie on your side with  your legs, hips, and shoulders in a straight line. Prop yourself up onto your forearm with your elbow directly under your shoulder. Lift your hips off the floor and balance on your forearm and the outside of your foot. Try to hold this position for 15 seconds and then slowly lower your hip to the ground. Switch sides and repeat. Work up to holding for 1 minute. This exercise can be made easier by starting with your knees and hips flexed toward your chest.            Back Exercises: Leg Pull        To start, lie on your back with your knees bent and feet flat on the floor. Dont press your neck or lower back to the floor. Breathe deeply. You should feel comfortable and relaxed in this position.  · Pull one knee to your chest.  · Hold for 30-60 seconds. Return to starting position.  · Repeat 2 times.  · Switch legs.  · For a double leg pull, pull both legs to your chest at the same time. Repeat 2 times.  For your safety, check with your healthcare provider before starting an exercise program.   © 3170-3569 [employer] [place]. [address] [place] [address], [place] [address]. All rights reserved. This information is not intended as a substitute for professional medical care. Always follow your healthcare professional's instructions.

## 2018-06-01 ENCOUNTER — OFFICE VISIT (OUTPATIENT)
Dept: FAMILY MEDICINE | Facility: CLINIC | Age: 63
End: 2018-06-01
Payer: COMMERCIAL

## 2018-06-01 ENCOUNTER — TELEPHONE (OUTPATIENT)
Dept: HEMATOLOGY/ONCOLOGY | Facility: CLINIC | Age: 63
End: 2018-06-01

## 2018-06-01 ENCOUNTER — TELEPHONE (OUTPATIENT)
Dept: FAMILY MEDICINE | Facility: CLINIC | Age: 63
End: 2018-06-01

## 2018-06-01 VITALS
HEIGHT: 69 IN | WEIGHT: 215.19 LBS | OXYGEN SATURATION: 95 % | BODY MASS INDEX: 31.87 KG/M2 | SYSTOLIC BLOOD PRESSURE: 128 MMHG | DIASTOLIC BLOOD PRESSURE: 84 MMHG | HEART RATE: 86 BPM | TEMPERATURE: 98 F

## 2018-06-01 DIAGNOSIS — R06.09 DOE (DYSPNEA ON EXERTION): ICD-10-CM

## 2018-06-01 DIAGNOSIS — I10 ESSENTIAL HYPERTENSION: ICD-10-CM

## 2018-06-01 DIAGNOSIS — F33.1 MODERATE EPISODE OF RECURRENT MAJOR DEPRESSIVE DISORDER: Primary | ICD-10-CM

## 2018-06-01 DIAGNOSIS — B02.21 RAMSAY HUNT SYNDROME (GENICULATE HERPES ZOSTER): ICD-10-CM

## 2018-06-01 DIAGNOSIS — R42 CHRONIC VERTIGO: ICD-10-CM

## 2018-06-01 DIAGNOSIS — Z86.73 OLD LACUNAR STROKE WITHOUT LATE EFFECT: ICD-10-CM

## 2018-06-01 PROCEDURE — 3079F DIAST BP 80-89 MM HG: CPT | Mod: CPTII,S$GLB,, | Performed by: FAMILY MEDICINE

## 2018-06-01 PROCEDURE — 3074F SYST BP LT 130 MM HG: CPT | Mod: CPTII,S$GLB,, | Performed by: FAMILY MEDICINE

## 2018-06-01 PROCEDURE — 99215 OFFICE O/P EST HI 40 MIN: CPT | Mod: S$GLB,,, | Performed by: FAMILY MEDICINE

## 2018-06-01 PROCEDURE — 99999 PR PBB SHADOW E&M-EST. PATIENT-LVL III: CPT | Mod: PBBFAC,,, | Performed by: FAMILY MEDICINE

## 2018-06-01 PROCEDURE — 3008F BODY MASS INDEX DOCD: CPT | Mod: CPTII,S$GLB,, | Performed by: FAMILY MEDICINE

## 2018-06-01 RX ORDER — ESCITALOPRAM OXALATE 10 MG/1
10 TABLET ORAL DAILY
Qty: 30 TABLET | Refills: 11 | Status: SHIPPED | OUTPATIENT
Start: 2018-06-01 | End: 2019-06-02 | Stop reason: SDUPTHER

## 2018-06-01 NOTE — TELEPHONE ENCOUNTER
----- Message from Jayce Lauren sent at 6/1/2018 11:01 AM CDT -----  Contact: self/[phone] or [phone]  Patient would like to be seen for a sooner appointment due to current dizziness and headaches.      Please call and advise.

## 2018-06-01 NOTE — PROGRESS NOTES
(Portions of this note were dictated using voice recognition software and may contain dictation related errors in spelling/grammar/syntax not found on text review)    CC: dizziness, headache    HPI: 62 y.o. male Last visit  on 04/07/2018. Chart note reviewed--Multiple issues discussed that prior visit including 1)hypertension for which we started  Norvasc 5 mg, 2) chronic vertigo following Live's palsy/[place] White syndrome for which has participated with physical therapy, meclizine if needed, 3)anxiety secondary to situational factors regarding health developments and loss of work for which we discussed talk therapy, 4)LEMUS with multiple risk factors for cardiac but also deconditioning etiology.  Had root decline further cardiogenic workup out of financial concerns.  Had discussed trying to gradually increase physical activity to assess for whether symptoms are improving which may happen with deconditioning problems, or worsening which may happen with cardiac or pulmonary issues.  5) pravastatin initiation secondary to history of remote lacunar stroke on right does discovered during workup for his initial Bell's palsy presentation in 2016    Many of the same symptoms described on today's visit.  Has not sought out talk therapy.  Was on Celexa for couple of days in the past but then had some intolerance as an stop taking it.  Currently not on any psychotropic medications.  Still quite frustrated about his medical situation.  Suffers from chronic vertigo, imbalance issues, memory problems, depression, loss of sleep, headaches.  He use to thrive in high stress environment set work but now is very frustrated that he cannot do what he would like to do.  Has gained weight over time.  Not very active.  Perseverates on the past a lot.  Not able to work anymore.    Available imaging review  MRI brain c/s contrast 5/20/16:      Small vessel disease without evidence of acute ischemic changes.  Old periventricular lacunar  infarct on the right. Fluid identified in the mastoid region on the right.  No evidence of a vestibular schwannoma.  Small bilateral hippocampal cyst.     CXR 9/18/17: no consolidation; small calcified granuloma Rt lower lung,        Past Medical History:   Diagnosis Date    Anxiety 8/5/2016    Bell's palsy     Benign essential HTN 4/27/2018    Lacunar infarction     Remote finding seen on cranial imaging during acute workup for Bell's palsy/[place] White    Pneumonia     age 5    Psoriasis        Past Surgical History:   Procedure Laterality Date    BACK SURGERY N/A 1998    bulging L5    TONSILLECTOMY         Family History   Problem Relation Age of Onset    Lung disease Mother     Glaucoma Mother     Cataracts Mother     Bladder Cancer Father     Alzheimer's disease Father     Glaucoma Father     Prostate cancer Father     Rheum arthritis Sister     Lupus Sister     Coronary artery disease Maternal Grandmother         nonpremature    Colon cancer Neg Hx        Social History     Social History    Marital status:      Spouse name: N/A    Number of children: N/A    Years of education: N/A     Occupational History    Not on file.     Social History Main Topics    Smoking status: Former Smoker     Packs/day: 1.00     Years: 45.00     Quit date: 12/31/2015    Smokeless tobacco: Not on file    Alcohol use 0.0 oz/week      Comment: rare    Drug use: No    Sexual activity: Yes     Partners: Female     Other Topics Concern    Not on file     Social History Narrative    Diet: no specific        Exercise: no         Lab Results   Component Value Date    WBC 8.58 05/12/2016    HGB 16.7 05/12/2016    HCT 48.8 05/12/2016     05/12/2016    CHOL 168 04/15/2016    TRIG 68 04/15/2016    HDL 42 04/15/2016    ALT 21 05/12/2016    AST 16 05/12/2016     05/12/2016    K 4.0 05/12/2016     05/12/2016    CREATININE 1.0 05/12/2016    CALCIUM 9.2 05/12/2016    BUN 15 05/12/2016    CO2 21  (L) 05/12/2016    TSH 0.988 04/15/2016    PSA 0.22 04/15/2016    LDLCALC 112.4 04/15/2016     (H) 05/12/2016           ROS:  GENERAL:  Fatigue, malaise, weight gain  SKIN: No rashes, no itching.  HEAD:  Headaches.  EYES:  Sometimes difficulty with near and far acuity, dry eye on the right  EARS:  Difficulty hearing  NOSE: No congestion or rhinorrhea.  MOUTH & THROAT:  Dry mouth  NODES: Denies swollen glands.  CHEST:  Dyspnea.  CARDIOVASCULAR:  Occasional fleeting left-sided chest pains.  ABDOMEN: No nausea, vomiting, or changes in bowel function.  URINARY: No flank pain, dysuria or hematuria.  PERIPHERAL VASCULAR: No claudication or cyanosis.  MUSCULOSKELETAL:  Joint stiffness and back pain  NEUROLOGIC:  Unsteadiness, difficulty with hand motions and also walking, uses a cane    Vital signs reviewed  PE:   APPEARANCE: Well nourished, well developed, in no acute distress.    HEAD: Normocephalic, atraumatic.  EYES:  Right-sided ptosis, chronic  PSYCHIATRIC::  Flat affect, again does get quite frustrated when discussing his situational factors and his overall health.  Reports poor concentration, poor memory, poor sleep, feeling down, feelings of guilt, anhedonia, perseveration    IMPRESSION     1. Moderate episode of recurrent major depressive disorder    2. Chronic vertigo    3. LEMUS (dyspnea on exertion)    4. Essential hypertension    5. Old lacunar stroke without late effect    6. Old [place] White syndrome (geniculate herpes zoster)            PLAN  45 min visit greater than 50% counseling.      Again she had significant amount of time spent in the importance of proper mental health treatment.  I again have implored him to seek counseling services to help with his depression and anxiety, mainly surrounding his health status and an inability to work.  He has tried doing some things around the house like laying down tile and trying to do some repairs but can only do a few minutes and then he gets winded or feels  dizzy and weak.  He gets increasingly frustrated that home renovations are left unfinished because he cannot do them himself.  I am hoping that cognitive behavioral therapy will help with his overall outlook on his health and improve his mental health altogether.  We discussed retry SSRI therapy.  Will try Lexapro 10 mg daily.  He can start with 5 mg daily for the 1st 3-7 days and then can go up to the 10 mg dose.  Strongly advised that he try to get the medication enough time to work.  He can take 2-4 weeks to notice affect of the medication.  I would like to see him back in about a month to reassess how things are going.  We can also get some labs as well given the fact of no recent lab work since 2016, especially in light of his multiple symptoms.  Had offered cardiac testing at last visit but he declined citing financial reasons.  I think it is still reasonable for him to try to gradually increase physical activity as see if this will help with some of his physical symptoms.  Has not increased in activity thigh as far    At the end of the visit, patient produces a rather extensive form that needs to be done in the next couple of days regarding full nature of his disability. He apparently has an appointment with the disability DrLisa coming up but states that he needs this form filled out 1st. Given the time constraints of the visit today after significant counseling time spent, this form was filled out to the best of our ability in the office with the patient and his wife, highlighting of multiple symptoms that he has chronically.  This will be scanned into the medical record.      Return in one month    SCREENINGS    Immunizations:  Tdap 4/15/16     Age/Gender Appropriate screenings:  Colonoscopy: hasn't had  Prostate, April 2016

## 2018-06-01 NOTE — TELEPHONE ENCOUNTER
----- Message from Jayce Lauren sent at 6/1/2018 10:58 AM CDT -----  Contact: self/[phone] or [phone]  Patient would like to be seen for a sooner appointment due to dizziness and headaches.      Please call and advise.

## 2018-06-07 ENCOUNTER — OFFICE VISIT (OUTPATIENT)
Dept: NEUROLOGY | Facility: CLINIC | Age: 63
End: 2018-06-07
Payer: COMMERCIAL

## 2018-06-07 VITALS
SYSTOLIC BLOOD PRESSURE: 132 MMHG | DIASTOLIC BLOOD PRESSURE: 82 MMHG | HEART RATE: 85 BPM | WEIGHT: 215.19 LBS | HEIGHT: 69 IN | BODY MASS INDEX: 31.87 KG/M2

## 2018-06-07 DIAGNOSIS — G51.0 BELL'S PALSY: ICD-10-CM

## 2018-06-07 DIAGNOSIS — F33.1 MODERATE EPISODE OF RECURRENT MAJOR DEPRESSIVE DISORDER: ICD-10-CM

## 2018-06-07 PROCEDURE — 99213 OFFICE O/P EST LOW 20 MIN: CPT | Mod: S$GLB,,, | Performed by: PSYCHIATRY & NEUROLOGY

## 2018-06-07 PROCEDURE — 3008F BODY MASS INDEX DOCD: CPT | Mod: CPTII,S$GLB,, | Performed by: PSYCHIATRY & NEUROLOGY

## 2018-06-07 PROCEDURE — 3075F SYST BP GE 130 - 139MM HG: CPT | Mod: CPTII,S$GLB,, | Performed by: PSYCHIATRY & NEUROLOGY

## 2018-06-07 PROCEDURE — 3079F DIAST BP 80-89 MM HG: CPT | Mod: CPTII,S$GLB,, | Performed by: PSYCHIATRY & NEUROLOGY

## 2018-06-07 PROCEDURE — 99999 PR PBB SHADOW E&M-EST. PATIENT-LVL III: CPT | Mod: PBBFAC,,, | Performed by: PSYCHIATRY & NEUROLOGY

## 2018-06-07 NOTE — PROGRESS NOTES
[facility] NEUROLOGY  [facility] [place]    Date: June 7, 2018   Patient Name: Jose Luis Rendon   MRN: [id number]   PCP: Deric Armstrong  Referring Provider: No ref. provider found    Assessment:      This is Jose Luis Rendon, 62 y.o. 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  Discussed prognosis with patient today.     Plan:      Problem List Items Addressed This Visit        Neuro    Bell's palsy    Current Assessment & Plan     -- reviewed conservative management strategies  -- patient working with PT            Psychiatric    Major depressive disorder with current active episode    Current Assessment & Plan     -- patient provided with list of low-cost area therapists             James Galvan MD  [facility]   Department of Neurology    Patient note was created using Dragon Dictation.  Any errors in syntax or even information may not have been identified and edited on initial review prior to signing this note.  Subjective:        HPI:   Mr. Jose Luis Rendon is a 62 y.o. male who presents with a chief complaint of Bell's palsy and [place] Hunt syndrome.  Since the patient's last visit, he reports that he has been doing reasonably well.  He reports minor improvement with the right side of his face and states that he has seen Physical therapy for ongoing management of his facial weakness.  He does continue to endorse significant depression states that he has not yet followed up with a psychiatrist due to cost concerns.  He does ask for list of local mental health resources again today.  He denies any other new complaints.    PAST MEDICAL HISTORY:  Past Medical History:   Diagnosis Date    Anxiety 8/5/2016    Bell's palsy     Benign essential HTN 4/27/2018    Lacunar infarction     Remote finding seen on cranial imaging during acute workup for Bell's palsy/[place] [facility]     Pneumonia     age 5    Psoriasis      PAST SURGICAL HISTORY:  Past Surgical History:   Procedure Laterality Date    BACK SURGERY N/A 1998    bulging L5    TONSILLECTOMY       CURRENT MEDS:  Current Outpatient Prescriptions   Medication Sig Dispense Refill    albuterol 90 mcg/actuation inhaler Inhale 2 puffs into the lungs every 6 (six) hours as needed for Wheezing or Shortness of Breath. 1 each 2    amLODIPine (NORVASC) 5 MG tablet Take 1 tablet (5 mg total) by mouth once daily. 30 tablet 11    aspirin (ECOTRIN) 81 MG EC tablet Take 1 tablet (81 mg total) by mouth once daily.  0    bacitracin (AK-TRACIN) ophthalmic ointment   0    escitalopram oxalate (LEXAPRO) 10 MG tablet Take 1 tablet (10 mg total) by mouth once daily. 30 tablet 11    meclizine (ANTIVERT) 25 mg tablet Take 1 tablet (25 mg total) by mouth 3 (three) times daily as needed for Dizziness. 30 tablet 3    pravastatin (PRAVACHOL) 20 MG tablet Take 1 tablet (20 mg total) by mouth once daily. 30 tablet 11    clobetasol (TEMOVATE) 0.05 % cream Apply topically 2 (two) times daily as needed. To inflamed areas of elbows 60 g 2     No current facility-administered medications for this visit.      ALLERGIES:  Review of patient's allergies indicates:   Allergen Reactions    Bee sting [allergen ext-venom-honey bee] Anaphylaxis and Swelling       FAMILY HISTORY:  Family History   Problem Relation Age of Onset    Lung disease Mother     Glaucoma Mother     Cataracts Mother     Bladder Cancer Father     Alzheimer's disease Father     Glaucoma Father     Prostate cancer Father     Rheum arthritis Sister     Lupus Sister     Coronary artery disease Maternal Grandmother         nonpremature    Colon cancer Neg Hx        SOCIAL HISTORY:  Social History   Substance Use Topics    Smoking status: Former Smoker     Packs/day: 1.00     Years: 45.00     Quit date: 12/31/2015    Smokeless tobacco: Not on file    Alcohol use 0.0 oz/week      Comment:  "rare     Review of Systems:  12 review of systems is negative except for the symptoms mentioned in HPI.      Objective:     Vitals:    06/07/18 1633   BP: 132/82   Pulse: 85   Weight: 97.6 kg (215 lb 2.7 oz)   Height: 5' 9" (1.753 m)       General: NAD, well nourished   Eyes: mild tearing of R eye, discharge, no erythema   ENT: moist mucous membranes of the oral cavity, nares patent, positive [place]-Hallpike to right  Neck: Supple, Full range of motion  Cardiovascular: Warm and well perfused, pulses equal and symmetrical  Lungs: Normal work of breathing, normal chest wall excursions  Skin: No rash, lesions, or breakdown   Psychiatry: Mood and affect are restricted and depressed  Abdomen: soft, non tender, non distended  Extremeties: No cyanosis, clubbing or edema.    Neurological   MENTAL STATUS: Alert and oriented to person, place, and time. Attention and concentration within normal limits. Speech without dysarthria, able to name and repeat without difficulty.   CRANIAL NERVES: Visual fields intact. PERRL. EOMI. Facial sensation intact. Face with right LMN pattern droop, blink now intact on R with slight synkinesis Hearing mildly reduced on R. Full shoulder shrug bilaterally. Tongue protrudes midline   SENSORY: Sensation is intact to light touch throughout.    MOTOR: Normal bulk and tone.  5/5 deltoid, biceps, triceps, hand  bilaterally. 5/5 iliopsoas, knee extension/flexion, foot dorsi/plantarflexion bilaterally.    CEREBELLAR/COORDINATION/GAIT: Gait steady with normal arm swing and stride length. Normal rapid alternating movements. Finger to nose intact            "

## 2018-06-08 NOTE — PATIENT INSTRUCTIONS
Bells Palsy  Bells palsy is a nerve disorder that usually happens suddenly and without warning. This condition happens when a nerve that controls facial movement is damaged. Nerve damage can happen for many reasons. But most cases of Bells palsy are probably caused by a virus.  Symptoms of Bells palsy  Here are signs of the disorder:   · Mild weakness to total paralysis of one side of your face  · Drooping mouth, drooling on one side of mouth  · Trouble closing one eye  · Noises seeming louder than usual  · Change in your sense of taste  When to go to the emergency department (ED)  There are conditions, such as stroke, that may look like Bell's palsy and are medical emergencies. Therefore, you should seek emergent medical care if you notice facial weakness or drooping. Although Bells palsy can be alarming, its rarely serious. Many people begin to improve in about 2 weeks, even without treatment. It is important to be seen as soon as possible. Most research shows that treatment with beneficial results was received within the first few days of symptoms.   Treatment  To treat Bells palsy, you may be given steroid medicines. This helps reduce swelling of the affected nerve. In some cases, your healthcare provider may prescribe an antiviral medicine. Your open eye may be covered with a patch to prevent it from drying out. You also may need to use eye drops and ointments for a time. Your healthcare provider will discuss follow-up care with you, including the possible need for further treatment to help your facial muscles return to normal.  Date Last Reviewed: 10/7/2015  © 7968-6996 [employer]. [address] [place] [address], [place] [address]. All rights reserved. This information is not intended as a substitute for professional medical care. Always follow your healthcare professional's instructions.

## 2018-06-26 ENCOUNTER — DOCUMENTATION ONLY (OUTPATIENT)
Dept: REHABILITATION | Facility: HOSPITAL | Age: 63
End: 2018-06-26

## 2018-06-26 ENCOUNTER — OFFICE VISIT (OUTPATIENT)
Dept: FAMILY MEDICINE | Facility: CLINIC | Age: 63
End: 2018-06-26
Payer: COMMERCIAL

## 2018-06-26 VITALS
HEART RATE: 100 BPM | WEIGHT: 212.06 LBS | OXYGEN SATURATION: 95 % | SYSTOLIC BLOOD PRESSURE: 125 MMHG | DIASTOLIC BLOOD PRESSURE: 87 MMHG | BODY MASS INDEX: 30.36 KG/M2 | HEIGHT: 70 IN | TEMPERATURE: 98 F

## 2018-06-26 DIAGNOSIS — F33.1 MODERATE EPISODE OF RECURRENT MAJOR DEPRESSIVE DISORDER: ICD-10-CM

## 2018-06-26 DIAGNOSIS — Z12.11 COLON CANCER SCREENING: ICD-10-CM

## 2018-06-26 DIAGNOSIS — I10 ESSENTIAL HYPERTENSION: ICD-10-CM

## 2018-06-26 DIAGNOSIS — Z86.73 OLD LACUNAR STROKE WITHOUT LATE EFFECT: ICD-10-CM

## 2018-06-26 DIAGNOSIS — R06.09 DOE (DYSPNEA ON EXERTION): ICD-10-CM

## 2018-06-26 DIAGNOSIS — J06.9 UPPER RESPIRATORY TRACT INFECTION, UNSPECIFIED TYPE: Primary | ICD-10-CM

## 2018-06-26 PROCEDURE — 99999 PR PBB SHADOW E&M-EST. PATIENT-LVL III: CPT | Mod: PBBFAC,,, | Performed by: FAMILY MEDICINE

## 2018-06-26 PROCEDURE — 99214 OFFICE O/P EST MOD 30 MIN: CPT | Mod: S$GLB,,, | Performed by: FAMILY MEDICINE

## 2018-06-26 PROCEDURE — 3074F SYST BP LT 130 MM HG: CPT | Mod: CPTII,S$GLB,, | Performed by: FAMILY MEDICINE

## 2018-06-26 PROCEDURE — 3008F BODY MASS INDEX DOCD: CPT | Mod: CPTII,S$GLB,, | Performed by: FAMILY MEDICINE

## 2018-06-26 PROCEDURE — 3079F DIAST BP 80-89 MM HG: CPT | Mod: CPTII,S$GLB,, | Performed by: FAMILY MEDICINE

## 2018-06-26 NOTE — PROGRESS NOTES
(Portions of this note were dictated using voice recognition software and may contain dictation related errors in spelling/grammar/syntax not found on text review)    CC: 1 mo f/u    HPI: 62 y.o. male     Last visit 06/01/2018. Chronic history of chronic vertigo following Bell's palsy and [place] Hunt syndrome, hypertension for which he was started on Norvasc 5 mg ,and prior remote lacunar infarct on imaging for which he was started on pravastatin..  Given his vertigo and debility following the above issues, had lost his job, lost insurance, and has had significant distress from  financial concerns. Suffers from chronic vertigo, imbalance issues, memory problems, depression, loss of sleep, headaches.  He use to thrive in high stress environment set work but now is very frustrated that he cannot do what he would like to do.  Has gained weight over time.  Not very active.  Perseverates on the past a lot.  Not able to work anymore.  Has described these recurrent multiple physical symptoms including   dyspnea on exertion (wtih cardiac risk factors but also deconditioning issues and had declined cardiogenic workup secondary to financial concerns on past discussion).  Had discussed a lot of his symptoms being related to psychological distress as well from adjustment disorder and significant frustration from medical health decline.  Significant time spent on prior discussions on advising on talk therapy referral, pharmacotherapy to help with depression and anxiety.    After much discussion, settled on starting Lexapro 10 mg a day(Tried Celexa but states he did not tolerate, only took for a couple of days).  Again emphasized the importance of talk therapy.  He had visited with his neurologist prior and she had given him a list of lower cost resources for talk therapy.    Presents today for follow-up.  Feeling a lot better.  Feels comfortable with the Lexapro 10 mg a day.    4 days ago started with sore throat which  progressed nasal congestion, rhinorrhea, postnasal drip, cough.  Still complaining of LEMUS as above, no worse than usual.     Past Medical History:   Diagnosis Date    Anxiety 8/5/2016    Bell's palsy     Benign essential HTN 4/27/2018    Lacunar infarction     Remote finding seen on cranial imaging during acute workup for Bell's palsy/[place]    Pneumonia     age 5    Psoriasis        Past Surgical History:   Procedure Laterality Date    BACK SURGERY N/A 1998    bulging L5    TONSILLECTOMY         Family History   Problem Relation Age of Onset    Lung disease Mother     Glaucoma Mother     Cataracts Mother     Bladder Cancer Father     Alzheimer's disease Father     Glaucoma Father     Prostate cancer Father     Rheum arthritis Sister     Lupus Sister     Coronary artery disease Maternal Grandmother         nonpremature    Colon cancer Neg Hx        Social History     Social History    Marital status:      Spouse name: N/A    Number of children: N/A    Years of education: N/A     Occupational History    Not on file.     Social History Main Topics    Smoking status: Former Smoker     Packs/day: 1.00     Years: 45.00     Quit date: 12/31/2015    Smokeless tobacco: Not on file    Alcohol use 0.0 oz/week      Comment: rare    Drug use: No    Sexual activity: Yes     Partners: Female     Other Topics Concern    Not on file     Social History Narrative    Diet: no specific        Exercise: no         Lab Results   Component Value Date    WBC 8.58 05/12/2016    HGB 16.7 05/12/2016    HCT 48.8 05/12/2016     05/12/2016    CHOL 168 04/15/2016    TRIG 68 04/15/2016    HDL 42 04/15/2016    ALT 21 05/12/2016    AST 16 05/12/2016     05/12/2016    K 4.0 05/12/2016     05/12/2016    CREATININE 1.0 05/12/2016    CALCIUM 9.2 05/12/2016    BUN 15 05/12/2016    CO2 21 (L) 05/12/2016    TSH 0.988 04/15/2016    PSA 0.22 04/15/2016    LDLCALC 112.4 04/15/2016     (H)  05/12/2016           ROS:  GENERAL: No fever, chills, fatigability or weight loss.  SKIN: No rashes, no itching.  HEAD: No headaches.  EYES: No visual changes  EARS: No ear pain or changes in hearing.  NOSE:  Above.  MOUTH & THROAT:  sore throat.  NODES: Denies swollen glands.  CHEST:  Above  CARDIOVASCULAR: Denies chest pain, PND, orthopnea.  ABDOMEN: No nausea, vomiting, or changes in bowel function.  URINARY: No flank pain, dysuria or hematuria.  PERIPHERAL VASCULAR: No claudication or cyanosis.  MUSCULOSKELETAL: No joint stiffness or swelling. Denies back pain.  NEUROLOGIC: No weakness or numbness.    Vital signs reviewed  Wt Readings from Last 15 Encounters:   06/26/18 96.2 kg (212 lb 1.3 oz)   06/07/18 97.6 kg (215 lb 2.7 oz)   06/01/18 97.6 kg (215 lb 2.7 oz)   04/27/18 98.4 kg (216 lb 14.9 oz)   04/04/18 97.7 kg (215 lb 6.2 oz)   09/18/17 95.9 kg (211 lb 6.7 oz)   11/11/16 93.4 kg (206 lb)   08/12/16 81.2 kg (179 lb)   08/12/16 81.4 kg (179 lb 7.3 oz)   08/05/16 80.2 kg (176 lb 12.9 oz)   07/12/16 81.9 kg (180 lb 8.9 oz)   05/12/16 88.5 kg (195 lb)   05/12/16 88.8 kg (195 lb 12.3 oz)   05/09/16 88.8 kg (195 lb 12.3 oz)   04/15/16 88.8 kg (195 lb 12.3 oz)         PE:   APPEARANCE: Well nourished, well developed, in no acute distress.    HEAD: Normocephalic, atraumatic.  No sinus tenderness  EYES: PERRL. EOMI.   Conjunctivae noninjected.  EARS: TM's intact. Light reflex normal. No retraction or perforation  NOSE:  Mild nasal turbinate edema bilaterally.  MOUTH & THROAT: No tonsillar enlargement.  Mild pharyngeal erythema with no exudate.  NECK: Supple with no cervical lymphadenopathy.    CHEST: Good inspiratory effort. Lungs clear to auscultation with no wheezes or crackles.  CARDIOVASCULAR: Normal S1, S2. No rubs, murmurs, or gallops.  ABDOMEN: Bowel sounds normal. Not distended. Soft.  Mild epigastric tenderness to palpation with no rebound or guarding.  No masses.  EXTREMITIES: No edema     IMPRESSION    1.  Upper respiratory tract infection, unspecified type    2. Essential hypertension    3. LEMUS (dyspnea on exertion)    4. Old lacunar stroke without late effect    5. Colon cancer screening    6. Moderate episode of recurrent major depressive disorder          PLAN  Advise expectant management with saline nasal rinses, Tylenol or Motrin as needed for pain or fever, Mucinex DM as needed for cough, and pseudoephedrine as needed for congestion.  Throat lozenges/warm saline gargles PRN sore throat. Expect symptom resolution to be between 7-10 days from time of onset of symptoms.  If symptoms last beyond this timeframe or patient develops worsening headaches, purulent nasal discharge, and fever, call or return to the office for further recommendations    Hypertension controlled.    Continue statin for prior lacunar infarct noted on imaging    Depression:  Currently under fair control with Lexapro 10 mg daily.  Continue current therapy    We did discuss rationale for lab work to follow renal function, hepatic function, also lipids given his statin use.  Also had discussed again the concept of cardiac workup for his LEMUS.  He is concerned still about financial burden of testing.  Could return for wellness exam which would at least cover screening lab work.  Discussed that there is not any indication for screening cardiac testing and that further cardiac testing would be a symptom driven issue.  He can check to his insurance to see what his financial responsibility   would be for such testing.  In the meantime he can gradually increase physical activity.  If his LEMUS steadily improved with more regular physical activity, this could be more of a deconditioning related issue, while if it steadily worsens, this could be more cardiac in nature.  He is willing to try this 1st, notify me if he feels that symptoms are worsening in which case I would certainly recommend cardiac testing        SCREENINGS     Immunizations:  Tdap 4/15/16     Age/Gender Appropriate screenings:  Colonoscopy: hasn't had, FIT kit ordered  Prostate, April 2016

## 2018-06-26 NOTE — PROGRESS NOTES
PHYSICAL THERAPY  Discharge  Date of Discharge 6/26/2018    [id number]    Pt was seen in Physical Therapy for initial evaluation on:4/18/18  Pt's last date of treatment in Physical Therapy was:4/18/18  Number of treatment sessions completed: 1  Reason for discharge:    self discharge / did not return for follow up appointments  Status at Discharge:  Goals not met     Discharge update in functional G code not available due to unplanned discharge.

## 2018-06-26 NOTE — PATIENT INSTRUCTIONS
Viral Upper respiratory tract infections    Most upper respiratory infections are in fact caused by viruses.  Unfortunately as there is no treatment to eradicate these viruses, treatment for upper respiratory infections relies on control of symptoms.      SORE THROAT:  You may take throat lozenges such as Halls cough drops for sore throat pain.  Also, gargling with warm salt water may help with pain symptoms also.  Chloraseptic Spray is an over-the-counter anesthetic spray that may provide relief for sore throat pain. Over-the-counter pain relievers such as Tylenol (acetaminophen), Motrin/Advil  (ibuprofen), or Aleve (naproxen) may also provide relief for sore throat pain. These medications will also help for body aches and/or fever.      NASAL CONGESTION  You may try an oral decongestant such as pseudoephedrine ( brand-name Sudafed).  Note that this medication is available only behind the pharmacy counter.  There are some over-the-counter decongestants with a medicine called phenylephrine but these may not be as effective.  There are some antihistamine/decongestant combination medications (for example, Claritin-D, Zyrtec-D, Allegra-D).  Since these medications already have pseudoephedrine in them, you may take these in place of plain Sudafed.  These may be effective additionally if there is early element of allergy symptoms contributing to your nasal congestion. Although there is a caution  about using decongestants in individuals with hypertension, if you are stable on blood pressure medications and her blood pressure is well-controlled, temporary use of these medications should be okay just as long as you continue to monitor your blood pressures.  If your blood pressure is not controlled on medication therapy, you may need to avoid these pseudoephedrine-containing medications and try antihistamines alone such as plain Claritin, Zyrtec, Allegra.     You may also use a device called a Neti Pot to help with nasal  congestion.  This is an effective treatment for helping clear out nasal congestion and drainage.  A Neti pot is a device in which a saltwater solution is flushed through the nasal passages to help clear out any congestion and help relieve swelling.  You may buy this at any pharmacy.   It is safe to take even if you  have uncontrolled hypertension or are taking certain medications and cannot take other therapies as listed above. However, it is important that she is sterile or distilled water to mix with the solution given to avoid any chance of contamination during the flushing process.    Cough  Cough is a common symptom with most upper respiratory tract infections. Many times this can occur late in the process of a cold and may be the last symptom to resolve.  This is usually a typical pattern for the upper respiratory virus, and usually not a sign that you have a chest infection.  You may have some coughing with mucus.  While no treatments are proven to be absolutely beneficial for cough, some available therapies include guaifenesin with dextromethorphan ( Mucinex DM, Robitussin-DM). This may help to settle the cough and to thin out any mucus associated with the cough.  It is important to stay well hydrated to avoid having the mucus become very thick and difficult to cough up. Many times the cough aspect of an upper respiratory infection may take up to 2 weeks to completely clear.       Most viral upper respiratory infections may take up to 7-10 days to clear.  Many people get better before this time, but some may take the full 7-10 days to completely get better.        Signs that may indicate something more than an upper respiratory viral infection may include:    - Persistent high fever above 100.4 ( note that early low-grade fever may even occur with a viral upper respiratory infection but usually gets better within a few days)    - While discolored mucus such as yellow or green drainage from the nose is not a  sign by itself of a bacterial infection, if you find that symptoms are lasting longer than 7-10 days and nasal drainage is becoming progressively more thick and discolored, this may be indicative of a bacterial sinus infection    - If overall your congestion and headache symptoms get worse beyond the 7-10 day time frame    - If your symptoms started to improve by the  7-10 day time frame but then suddenly got worse

## 2018-07-06 DIAGNOSIS — Z11.59 NEED FOR HEPATITIS C SCREENING TEST: ICD-10-CM

## 2019-05-06 RX ORDER — AMLODIPINE BESYLATE 5 MG/1
TABLET ORAL
Qty: 30 TABLET | Refills: 11 | Status: SHIPPED | OUTPATIENT
Start: 2019-05-06 | End: 2020-05-28 | Stop reason: SDUPTHER

## 2019-05-06 RX ORDER — PRAVASTATIN SODIUM 20 MG/1
TABLET ORAL
Qty: 30 TABLET | Refills: 11 | Status: SHIPPED | OUTPATIENT
Start: 2019-05-06 | End: 2020-05-28 | Stop reason: SDUPTHER

## 2019-06-02 RX ORDER — ESCITALOPRAM OXALATE 10 MG/1
TABLET ORAL
Qty: 30 TABLET | Refills: 0 | Status: SHIPPED | OUTPATIENT
Start: 2019-06-02 | End: 2019-07-17 | Stop reason: SDUPTHER

## 2019-07-17 RX ORDER — ESCITALOPRAM OXALATE 10 MG/1
TABLET ORAL
Qty: 30 TABLET | Refills: 11 | Status: SHIPPED | OUTPATIENT
Start: 2019-07-17 | End: 2020-08-17 | Stop reason: SDUPTHER

## 2020-05-04 ENCOUNTER — TELEPHONE (OUTPATIENT)
Dept: TRANSPLANT | Facility: CLINIC | Age: 65
End: 2020-05-04

## 2020-05-04 NOTE — TELEPHONE ENCOUNTER
Spoke with pt. His step son is listed in [facility].  Informed him that we are only able to work up donors for recipients listed at [facility].     All questions answered, understanding noted                        ----- Message from Daphney Babin RN sent at 5/4/2020  3:09 PM CDT -----  Contact: Pt      ----- Message -----  From: David Banerjee  Sent: 5/4/2020   2:11 PM CDT  To: [facility] Donor Coordinator    Pt wants to speak to donor coord regarding info about being a future donor for son.      [phone]    [email or web address]

## 2020-05-12 ENCOUNTER — OFFICE VISIT (OUTPATIENT)
Dept: FAMILY MEDICINE | Facility: CLINIC | Age: 65
End: 2020-05-12
Payer: MEDICARE

## 2020-05-12 VITALS
OXYGEN SATURATION: 93 % | BODY MASS INDEX: 31.16 KG/M2 | DIASTOLIC BLOOD PRESSURE: 76 MMHG | WEIGHT: 217.63 LBS | HEIGHT: 70 IN | TEMPERATURE: 98 F | SYSTOLIC BLOOD PRESSURE: 136 MMHG | HEART RATE: 88 BPM

## 2020-05-12 DIAGNOSIS — H57.89 DISCHARGE OF RIGHT EYE: ICD-10-CM

## 2020-05-12 DIAGNOSIS — H53.8 BLURRED VISION: ICD-10-CM

## 2020-05-12 DIAGNOSIS — R06.09 DOE (DYSPNEA ON EXERTION): ICD-10-CM

## 2020-05-12 DIAGNOSIS — Z12.11 COLON CANCER SCREENING: ICD-10-CM

## 2020-05-12 DIAGNOSIS — R39.198 DECREASED URINE STREAM: ICD-10-CM

## 2020-05-12 DIAGNOSIS — F33.1 MODERATE EPISODE OF RECURRENT MAJOR DEPRESSIVE DISORDER: ICD-10-CM

## 2020-05-12 DIAGNOSIS — B02.21 RAMSAY HUNT SYNDROME (GENICULATE HERPES ZOSTER): ICD-10-CM

## 2020-05-12 DIAGNOSIS — Z12.5 SCREENING FOR MALIGNANT NEOPLASM OF PROSTATE: ICD-10-CM

## 2020-05-12 DIAGNOSIS — Z86.73 OLD LACUNAR STROKE WITHOUT LATE EFFECT: ICD-10-CM

## 2020-05-12 DIAGNOSIS — L82.1 SK (SEBORRHEIC KERATOSIS): ICD-10-CM

## 2020-05-12 DIAGNOSIS — L72.3 SEBACEOUS CYST: ICD-10-CM

## 2020-05-12 DIAGNOSIS — I10 HYPERTENSION, UNSPECIFIED TYPE: Primary | ICD-10-CM

## 2020-05-12 DIAGNOSIS — E78.5 HYPERLIPIDEMIA, UNSPECIFIED HYPERLIPIDEMIA TYPE: ICD-10-CM

## 2020-05-12 PROCEDURE — 1159F PR MEDICATION LIST DOCUMENTED IN MEDICAL RECORD: ICD-10-PCS | Mod: S$GLB,,, | Performed by: FAMILY MEDICINE

## 2020-05-12 PROCEDURE — 93005 EKG 12-LEAD: ICD-10-PCS | Mod: S$GLB,,, | Performed by: FAMILY MEDICINE

## 2020-05-12 PROCEDURE — 1126F PR PAIN SEVERITY QUANTIFIED, NO PAIN PRESENT: ICD-10-PCS | Mod: S$GLB,,, | Performed by: FAMILY MEDICINE

## 2020-05-12 PROCEDURE — 1126F AMNT PAIN NOTED NONE PRSNT: CPT | Mod: S$GLB,,, | Performed by: FAMILY MEDICINE

## 2020-05-12 PROCEDURE — 93010 ELECTROCARDIOGRAM REPORT: CPT | Mod: S$GLB,,, | Performed by: INTERNAL MEDICINE

## 2020-05-12 PROCEDURE — 99999 PR PBB SHADOW E&M-EST. PATIENT-LVL V: ICD-10-PCS | Mod: PBBFAC,,, | Performed by: FAMILY MEDICINE

## 2020-05-12 PROCEDURE — 99999 PR PBB SHADOW E&M-EST. PATIENT-LVL V: CPT | Mod: PBBFAC,,, | Performed by: FAMILY MEDICINE

## 2020-05-12 PROCEDURE — 3075F PR MOST RECENT SYSTOLIC BLOOD PRESS GE 130-139MM HG: ICD-10-PCS | Mod: S$GLB,,, | Performed by: FAMILY MEDICINE

## 2020-05-12 PROCEDURE — G0009 PNEUMOCOCCAL CONJUGATE VACCINE 13-VALENT LESS THAN 5YO & GREATER THAN: ICD-10-PCS | Mod: S$GLB,,, | Performed by: FAMILY MEDICINE

## 2020-05-12 PROCEDURE — 99215 PR OFFICE/OUTPT VISIT, EST, LEVL V, 40-54 MIN: ICD-10-PCS | Mod: 25,S$GLB,, | Performed by: FAMILY MEDICINE

## 2020-05-12 PROCEDURE — 1159F MED LIST DOCD IN RCRD: CPT | Mod: S$GLB,,, | Performed by: FAMILY MEDICINE

## 2020-05-12 PROCEDURE — 90670 PCV13 VACCINE IM: CPT | Mod: S$GLB,,, | Performed by: FAMILY MEDICINE

## 2020-05-12 PROCEDURE — G0009 ADMIN PNEUMOCOCCAL VACCINE: HCPCS | Mod: S$GLB,,, | Performed by: FAMILY MEDICINE

## 2020-05-12 PROCEDURE — 90670 PNEUMOCOCCAL CONJUGATE VACCINE 13-VALENT LESS THAN 5YO & GREATER THAN: ICD-10-PCS | Mod: S$GLB,,, | Performed by: FAMILY MEDICINE

## 2020-05-12 PROCEDURE — 1101F PR PT FALLS ASSESS DOC 0-1 FALLS W/OUT INJ PAST YR: ICD-10-PCS | Mod: S$GLB,,, | Performed by: FAMILY MEDICINE

## 2020-05-12 PROCEDURE — 99215 OFFICE O/P EST HI 40 MIN: CPT | Mod: 25,S$GLB,, | Performed by: FAMILY MEDICINE

## 2020-05-12 PROCEDURE — 1101F PT FALLS ASSESS-DOCD LE1/YR: CPT | Mod: S$GLB,,, | Performed by: FAMILY MEDICINE

## 2020-05-12 PROCEDURE — 93005 ELECTROCARDIOGRAM TRACING: CPT | Mod: S$GLB,,, | Performed by: FAMILY MEDICINE

## 2020-05-12 PROCEDURE — 3078F DIAST BP <80 MM HG: CPT | Mod: S$GLB,,, | Performed by: FAMILY MEDICINE

## 2020-05-12 PROCEDURE — 93010 EKG 12-LEAD: ICD-10-PCS | Mod: S$GLB,,, | Performed by: INTERNAL MEDICINE

## 2020-05-12 PROCEDURE — 3075F SYST BP GE 130 - 139MM HG: CPT | Mod: S$GLB,,, | Performed by: FAMILY MEDICINE

## 2020-05-12 PROCEDURE — 3078F PR MOST RECENT DIASTOLIC BLOOD PRESSURE < 80 MM HG: ICD-10-PCS | Mod: S$GLB,,, | Performed by: FAMILY MEDICINE

## 2020-05-12 NOTE — PROGRESS NOTES
(Portions of this note were dictated using voice recognition software and may contain dictation related errors in spelling/grammar/syntax not found on text review)    CC:   Chief Complaint   Patient presents with    Annual Exam       HPI: 64 y.o. male     Keyon Rendon is a 63 yo M with a PMHx of Bell's Palsy, [place] syndrome, HTN, lacunar infarct and Anxiety. Last visit was 2 years ago.      Pt states he is still suffering from dizziness. Describes his dizziness as lightheadedness on exertion. Such as picking up a heavy bag of dog food or mowing the lawn. Also states that in the morning if he sits up to fast he can feel lightheaded. He has never lost his balance or fallen. Still experiences dyspnea on exertion but feels there is no change from 2 years ago. Denies dizziness from head position changes and chest pain. Is still taking Norvasc 5 mg, Asprin, and Pravastatin 20 mg.  Had discussed cardiac workup in the past although he had declined secondary to financial concerns.  Does not get any exercise    Occasionally experiences palpitations which he attributes to his anxiety. Still taking lexapro 10 mg and finds that it helps. Sometimes has trouble sleeping right now due to current issues (COVID-19 pandemic, recent separation from his wife) but is still able to sleep with deep breathing exercises and feel rested.     Pt has green discharge in the R eye for the past few years. He is able to wipe away the discharge and has been using lubricating eye drops. Feels that the his overall vision has declined and that is R eye is blurrier than the L. Denies any eye pain or floaters.  This eye was affected when he had Bell's palsy in [place], had difficult time closing his right eye for a while    He has been experiencing urinary urgency where he feels like he has to go all of the sudden. Wakes up 0-4 times a night to use the restroom. He does drink tea or decaf coffee at night along with ice tea. Endorses a weak  stream and occasional straining. Denies dysuria and hematuria.  On further discussion does not feel terribly bothered by the symptoms    Noticed a mole on his stomach that appears bigger in size.     Also has a chronic lump on back of neck present for many years that will occasionally have thick drainage.  Was much bigger but has gone down over the years.    Last colonoscopy was about 10 years ago. Still not smoking, quit in  but was a smoker for almost 40 year. Exercising very little and is now  from his wife.        Past Medical History:   Diagnosis Date    Anxiety 2016    Bell's palsy     Benign essential HTN 2018    Lacunar infarction     Remote finding seen on cranial imaging during acute workup for Bell's palsy/[place]    Pneumonia     age 5    Psoriasis        Past Surgical History:   Procedure Laterality Date    BACK SURGERY N/A     bulging L5    TONSILLECTOMY         Family History   Problem Relation Age of Onset    Lung disease Mother     Glaucoma Mother     Cataracts Mother     Bladder Cancer Father     Alzheimer's disease Father     Glaucoma Father     Prostate cancer Father     Rheum arthritis Sister     Lupus Sister     Coronary artery disease Maternal Grandmother         nonpremature    Colon cancer Neg Hx        Social History     Tobacco Use    Smoking status: Former Smoker     Packs/day: 1.00     Years: 45.00     Pack years: 45.00     Last attempt to quit: 2015     Years since quittin.3   Substance Use Topics    Alcohol use: Yes     Alcohol/week: 0.0 standard drinks     Frequency: Monthly or less     Drinks per session: 1 or 2     Binge frequency: Never     Comment: rare    Drug use: No       Lab Results   Component Value Date    WBC 8.58 2016    HGB 16.7 2016    HCT 48.8 2016    MCV 92 2016     2016    CHOL 168 04/15/2016    TRIG 68 04/15/2016    HDL 42 04/15/2016    ALT 21 2016    AST 16  05/12/2016    BILITOT 1.3 (H) 05/12/2016    ALKPHOS 79 05/12/2016     05/12/2016    K 4.0 05/12/2016     05/12/2016    CREATININE 1.0 05/12/2016    ESTGFRAFRICA >60 05/12/2016    EGFRNONAA >60 05/12/2016    CALCIUM 9.2 05/12/2016    ALBUMIN 4.0 05/12/2016    BUN 15 05/12/2016    CO2 21 (L) 05/12/2016    TSH 0.988 04/15/2016    PSA 0.22 04/15/2016    LDLCALC 112.4 04/15/2016     (H) 05/12/2016         Review of Systems   Constitutional: Negative for chills and fever.   HENT: Negative for hearing loss.    Eyes: Positive for blurred vision and discharge.   Respiratory: Positive for shortness of breath. Negative for cough and wheezing.    Cardiovascular: Positive for leg swelling. Negative for chest pain and palpitations.   Gastrointestinal: Negative for blood in stool, constipation, diarrhea and vomiting.   Genitourinary: Positive for urgency. Negative for dysuria and hematuria.   Musculoskeletal: Negative for neck pain.   Neurological: Positive for dizziness. Negative for loss of consciousness and headaches.   Endo/Heme/Allergies: Negative for polydipsia.       Vital signs reviewed  Physical Exam   Constitutional: He is oriented to person, place, and time and well-developed, well-nourished, and in no distress. No distress.   HENT:   Right Ear: Tympanic membrane, external ear and ear canal normal.   Left Ear: Tympanic membrane, external ear and ear canal normal.   Nose: Nose normal.   Mouth/Throat: Oropharynx is clear and moist.   Eyes: Pupils are equal, round, and reactive to light. Conjunctivae are normal.   Neck: Normal range of motion. No JVD present. Carotid bruit is not present.       Cardiovascular: Normal rate, regular rhythm and normal heart sounds.   No murmur heard.  Pulmonary/Chest: Effort normal and breath sounds normal. No respiratory distress. He has no wheezes.   Abdominal: Soft. Bowel sounds are normal. There is no tenderness.   Musculoskeletal: He exhibits edema (1+ pitting edema ).    Lymphadenopathy:     He has no cervical adenopathy.   Neurological: He is alert and oriented to person, place, and time. No cranial nerve deficit (Chronic from Bell's Palsy).   Mild ptosis noted of right eye   Skin: Rash noted. He is not diaphoretic.        Psychiatric: Mood and affect normal.         IMPRESSION  1. Hypertension, unspecified type    2. BMI 31.0-31.9,adult    3. Hyperlipidemia, unspecified hyperlipidemia type    4. LEMUS (dyspnea on exertion)    5. Decreased urine stream    6. Screening for malignant neoplasm of prostate    7. Colon cancer screening    8. Blurred vision    9. Discharge of right eye    10. Old lacunar stroke without late effect    11. 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 Moderate episode of recurrent major depressive disorder        PLAN  Reviewed prior medical notes from 2018 regarding depression anxiety improvement on Lexapro.  Reviewed his health screenings, labs, Ms. cortez shin status as noted    Cardiac workup to be completed due to HTN, dyspnea on exertion, and smoking history. Will get an EKG in office today and schedule an echo stress test. Continue Norvasc 5 mg. Referral to [facility], however want to complete cardiac workup before starting.  EKG reviewed personally showing normal sinus rhythm with right bundle branch block.  No acute ischemic changes.  Stress echo ordered    Labs below secondary to lightheadedness, risk factor assessment including lipid profile given lacunar CVA history.  Continue aspirin and statin for secondary stroke prevention    Ambulatory referral to Optometry for blurred vision and R eye discharge.     Continue taking Lexapro 10 mg for anxiety.    SK:  Reassurance.  Notify for any change in symptoms    Skin lesion on nose:  Try holding off on the breathe right strips for while in case this is from irritation from this.  If no improvement or significant worsening, may need dermatology referral in case of malignancy eval     Sebaceous cyst  posterior neck:  Reassurance, could get back in the office for punch biopsy drainage and wall removal although he will think about this.  Does not feel very bothered at this time       Orders Placed This Encounter   Procedures    (In Office Administered) Pneumococcal Conjugate Vaccine (13 Valent) (IM)    CBC auto differential    Comprehensive metabolic panel    Lipid Panel    TSH    PSA, Screening    Fecal Immunochemical Test (iFOBT)    [facility] referral[facility]consult to [facility] ([facility]    Ambulatory referral/consult to Optometry    MyChart Patient Entered [facility] ([facility])    EKG 12-lead    Stress Echo Which stress agent will be used? Treadmill Exercise; Color Flow Doppler? No             SCREENING   Prostate screening:  PSA ordered  Colon cancer screening:  Fecal immunochemical test ordered    Immunization  Prevnar 13  today  Pneumovax 2021  Tdap 2016    AAA screening:  Due although patient is concerned about financial stressors at this time.  May consider ordering screening at future visit    LDCT is due although given financial concerns, will consider ordering at future visit        I hereby acknowledge that I am relying upon documentation authored by a medical student working under my supervision and further I hereby attest that I have verified the student documentation or findings by personally re-performing the physical exam and medical decision making activities of the Evaluation and Management service to be billed.    Deric Armstrong MD

## 2020-05-15 ENCOUNTER — TELEPHONE (OUTPATIENT)
Dept: FAMILY MEDICINE | Facility: CLINIC | Age: 65
End: 2020-05-15

## 2020-05-15 NOTE — TELEPHONE ENCOUNTER
Patient was concerned that in his summary, it stated he had a bundle branch block and is wanting to know what he should know or do about this?  Please advise.

## 2020-05-15 NOTE — TELEPHONE ENCOUNTER
There was incidentally noted to be a right bundle branch block which is a benign finding which had does not typically have any major implications.  We are getting the stress echo test anyway to assess for any blockages relating to the heart arteries given some of the symptoms he described in office.

## 2020-05-18 ENCOUNTER — LAB VISIT (OUTPATIENT)
Dept: LAB | Facility: HOSPITAL | Age: 65
End: 2020-05-18
Attending: FAMILY MEDICINE
Payer: MEDICARE

## 2020-05-18 DIAGNOSIS — Z12.11 COLON CANCER SCREENING: ICD-10-CM

## 2020-05-18 PROCEDURE — 82274 ASSAY TEST FOR BLOOD FECAL: CPT

## 2020-05-21 ENCOUNTER — PATIENT OUTREACH (OUTPATIENT)
Dept: ADMINISTRATIVE | Facility: OTHER | Age: 65
End: 2020-05-21

## 2020-05-21 ENCOUNTER — TELEPHONE (OUTPATIENT)
Dept: FAMILY MEDICINE | Facility: CLINIC | Age: 65
End: 2020-05-21

## 2020-05-21 DIAGNOSIS — I10 HYPERTENSION, UNSPECIFIED TYPE: ICD-10-CM

## 2020-05-21 DIAGNOSIS — E78.5 HYPERLIPIDEMIA, UNSPECIFIED HYPERLIPIDEMIA TYPE: ICD-10-CM

## 2020-05-21 LAB — HEMOCCULT STL QL IA: POSITIVE

## 2020-05-21 NOTE — TELEPHONE ENCOUNTER
----- Message from Bernadette Hoff RD sent at 5/21/2020 10:12 AM CDT -----  Good morning.  Please place a referral to [facility], Dietitian, Internal Medicine for this patient's nutrition appt on Monday.  [facility].  Thank you.

## 2020-05-22 NOTE — TELEPHONE ENCOUNTER
Orders Placed This Encounter   Procedures    Ambulatory referral/consult to Nutrition Services

## 2020-05-25 ENCOUNTER — NUTRITION (OUTPATIENT)
Dept: NUTRITION | Facility: CLINIC | Age: 65
End: 2020-05-25
Payer: MEDICARE

## 2020-05-25 ENCOUNTER — OFFICE VISIT (OUTPATIENT)
Dept: OPTOMETRY | Facility: CLINIC | Age: 65
End: 2020-05-25
Payer: MEDICARE

## 2020-05-25 VITALS — BODY MASS INDEX: 31.16 KG/M2 | HEIGHT: 70 IN | WEIGHT: 217.63 LBS

## 2020-05-25 DIAGNOSIS — I10 BENIGN ESSENTIAL HTN: ICD-10-CM

## 2020-05-25 DIAGNOSIS — Z71.3 DIETARY COUNSELING: Primary | ICD-10-CM

## 2020-05-25 DIAGNOSIS — H53.8 BLURRED VISION: ICD-10-CM

## 2020-05-25 DIAGNOSIS — E78.5 HYPERLIPIDEMIA, UNSPECIFIED HYPERLIPIDEMIA TYPE: ICD-10-CM

## 2020-05-25 DIAGNOSIS — H57.89 DISCHARGE OF RIGHT EYE: ICD-10-CM

## 2020-05-25 DIAGNOSIS — I10 HYPERTENSION, UNSPECIFIED TYPE: ICD-10-CM

## 2020-05-25 DIAGNOSIS — H25.13 NUCLEAR SCLEROSIS, BILATERAL: Primary | ICD-10-CM

## 2020-05-25 DIAGNOSIS — H16.211 EXPOSURE KERATOPATHY, RIGHT: ICD-10-CM

## 2020-05-25 PROCEDURE — 97802 MEDICAL NUTRITION INDIV IN: CPT | Mod: S$GLB,,, | Performed by: DIETITIAN, REGISTERED

## 2020-05-25 PROCEDURE — 99999 PR PBB SHADOW E&M-EST. PATIENT-LVL II: ICD-10-PCS | Mod: PBBFAC,,, | Performed by: OPTOMETRIST

## 2020-05-25 PROCEDURE — 97802 PR MED NUTR THER, 1ST, INDIV, EA 15 MIN: ICD-10-PCS | Mod: S$GLB,,, | Performed by: DIETITIAN, REGISTERED

## 2020-05-25 PROCEDURE — 92004 PR EYE EXAM, NEW PATIENT,COMPREHESV: ICD-10-PCS | Mod: S$GLB,,, | Performed by: OPTOMETRIST

## 2020-05-25 PROCEDURE — 92004 COMPRE OPH EXAM NEW PT 1/>: CPT | Mod: S$GLB,,, | Performed by: OPTOMETRIST

## 2020-05-25 PROCEDURE — 92015 PR REFRACTION: ICD-10-PCS | Mod: S$GLB,,, | Performed by: OPTOMETRIST

## 2020-05-25 PROCEDURE — 99999 PR PBB SHADOW E&M-EST. PATIENT-LVL III: ICD-10-PCS | Mod: PBBFAC,,,

## 2020-05-25 PROCEDURE — 92015 DETERMINE REFRACTIVE STATE: CPT | Mod: S$GLB,,, | Performed by: OPTOMETRIST

## 2020-05-25 PROCEDURE — 99999 PR PBB SHADOW E&M-EST. PATIENT-LVL II: CPT | Mod: PBBFAC,,, | Performed by: OPTOMETRIST

## 2020-05-25 PROCEDURE — 99999 PR PBB SHADOW E&M-EST. PATIENT-LVL III: CPT | Mod: PBBFAC,,,

## 2020-05-25 NOTE — PATIENT INSTRUCTIONS
1800 calorie, low fat, low sodium, high fiber diet.  Avoid sugary beverages.  Exercise goal:  As tolerated.

## 2020-05-25 NOTE — LETTER
May 25, 2020      Deric Armstrong MD  [address]  [address]  [place] [address]           [facility]  [address]  [place] [address]  Phone: [phone]  Fax: [phone]          Patient: Jose Luis Rendon   MR Number: [id number]   YOB: 1955   Date of Visit: 5/25/2020       Dear Dr. Deric Armstrong:    Thank you for referring Jose Luis Rendon to me for evaluation. Attached you will find relevant portions of my assessment and plan of care.    If you have questions, please do not hesitate to call me. I look forward to following Jose Luis Rendon along with you.    Sincerely,    Yunior Norris, OD    Enclosure  CC:  No Recipients    If you would like to receive this communication electronically, please contact [email or web address] or [phone] to request more information on [employer] Link access.    For providers and/or their staff who would like to refer a patient to [facility], please contact us through our one-stop-shop provider referral line, [facility], at [phone].    If you feel you have received this communication in error or would no longer like to receive these types of communications, please e-mail [email or web address]

## 2020-05-25 NOTE — PROGRESS NOTES
"Referring Physician:Deric Armstrong MD     Reason for visit:  Chief Complaint   Patient presents with    Hyperlipidemia    Hypertension    Obesity    Nutrition Counseling      Initial Visit    :1955     Allergies Reviewed  Meds Reviewed    Anthropometrics  Weight:98.7 kg (217 lb 9.5 oz)  Height:5' 9.5" (1.765 m)  BMI:Body mass index is 31.67 kg/m².   IBW:   75.5 kg  +/-10%    Meds:  Outpatient Medications Prior to Visit   Medication Sig Dispense Refill    amLODIPine (NORVASC) 5 MG tablet TAKE 1 TABLET(5 MG) BY MOUTH EVERY DAY 30 tablet 11    aspirin (ECOTRIN) 81 MG EC tablet Take 1 tablet (81 mg total) by mouth once daily.  0    bacitracin (AK-TRACIN) ophthalmic ointment   0    clobetasol (TEMOVATE) 0.05 % cream Apply topically 2 (two) times daily as needed. To inflamed areas of elbows 60 g 2    escitalopram oxalate (LEXAPRO) 10 MG tablet TAKE 1 TABLET(10 MG) BY MOUTH EVERY DAY 30 tablet 11    pravastatin (PRAVACHOL) 20 MG tablet TAKE 1 TABLET(20 MG) BY MOUTH EVERY DAY 30 tablet 11     No facility-administered medications prior to visit.        Food/Drug Interactions Noted:  n/a    Vitamins/Supplements/Herbs:  none    Labs: no recent labs available for review     Nutrition Prescription:   2265 Kcals/day( 30 kcal/kg IBW),  60 g protein( 0.8g/kg IBW)     Support System:    Pt lives alone (is in process of  from spouse), does his own grocery shopping and cooking  Pt states he had Bell's Palsy approx 4 years ago, which led to his early medical USP, limits his ability to exercise, and has affected his sense of taste-only tastes salt, sweet, sour, bitter.    Diet Hx: pt states his usual routine has always to skip or eat small amounts for breakfast and lunch, and then eat a very heavy meal for dinner.  Drinks sweet tea, coffee with 2 spoons sugar, Barqs root beer, 2% Lactaid milk (sometimes adds Philo syrup to make chocolate milk) .  Likes to cook.  Uses Cirilo Cardenas salt-free " and regular seasonings, and salts some foods.  States he does read food labels sometimes for salt content; noticed the oriental noodles he likes are extremely high in salt.       Breakfast:   Usually eats nothing.  Drinks 1-2 cups of coffee with 2 spoons sugar and half and half in each.  Lunch:   Occasionally has sandwich and few chips.  Sweet tea.  Dinner:   Last night:  2 grilled chicken breasts, steamed spinach with 2 hard cooked eggs, canned beet salad with vinegar and olive oil and seasonings.  Aníbal sweet tea  Snacks:  Usually in the evening:  Chips and salsa or a few cookies or chips.  Chocolate milk-2% Lactaid milk + Katie's syrup.    Current activity level and/or physical limitations:    Walks his dog    Motivation to make changes/anticipated barriers and/or expected adherence:  Although pt voiced interest in losing a few pounds, he does not seem ready to make changes to current diet regimen.    Nutrition-Focus Physical Findings:    Wearing face mask per COVID19 protocol; appears well nourished      Assessment:   Pt very attentive and asked relevant questions about foods/beverages recommended and to avoid; seasoning without using salt; portion control; reading food labels; using butter vs margarine; healthy snacking.  All questions answered, and he verbalized understanding of information.    Nutrition Diagnosis:   Food- and nutrition-related knowledge deficit RT lack of prior exposure to information AEB dx obesity/HTN      Recommendations: 1800 calorie, low fat, low sodium, high fiber diet.  Avoid sugary beverages.  Exercise goal:  As tolerated.  Handouts provided and reviewed:  Cardiac Nutrition Therapy; 1800 Calorie Sample 5-Day Menus; Weight Loss Tips; Cooking Tips for Weight Management; Get Fit Shopping List; Eat Fit Plan...Anytime/Anywhere;  Servings of Carbohydrates for Meal Planning; Walking Works; My Plate Planner;  Heart Healthy Eating:  Shopping Tips and Label Reading Tips; [facility] Snack List for  Diabetes        Strategies Implemented:    Avoid/limit sugary beverages.  Read food labels for sodium/calorie content    Consultation Time:30 minutes.  Communicated with referring healthcare provider:  Consult note available in pt's Epic chart per MD discretion  Follow Up:  Pt provided with dietitian contact number and advised to call with questions or make future appointment if further intervention needed.

## 2020-05-25 NOTE — PROGRESS NOTES
HPI     DLS  04/21/2016 Dr. Garcia  Patient feels distance vision has decreased since last visit.  Patient has history of [place] Palsey affecting OD, patient complaint of   early morning thick green mucus discharge in OD X 4-6 months.  Patient feels vision in at distance and near has gotten worse since last   visit.  Flashes+Only after rubbing eyes very hard.  Floaters+  Ha-      Eye meds  AT PRN OU    Last edited by Galen Huff on 5/25/2020  9:46 AM. (History)        ROS     Positive for: Neurological (bells palsy right eye)    Negative for: Constitutional, Gastrointestinal, Skin, Genitourinary,   Musculoskeletal, HENT, Endocrine, Cardiovascular, Eyes, Respiratory,   Psychiatric, Allergic/Imm, Heme/Lymph    Last edited by Yunior Norris, OD on 5/25/2020  9:49 AM. (History)        Assessment /Plan     For exam results, see Encounter Report.    Nuclear sclerosis, bilateral    Blurred vision  -     Ambulatory referral/consult to Optometry    Discharge of right eye  -     Ambulatory referral/consult to Optometry    Exposure keratopathy, right      1. Small Cats OU--pt wishes new spex Rx  2. Exposure K OD 2 to incomplete blink sp Balls palsy right side 2016.  Pt not using ATs as directed (just using them a couple times a week!)    PLAN:    1. SOOTHE XP ATS QID+  2. Patch/sleep mask at night  3. Pt will call if sx persist w tx, and will get eyelid consult.  Otherwise rtc 1 yr

## 2020-05-26 ENCOUNTER — PATIENT OUTREACH (OUTPATIENT)
Dept: ADMINISTRATIVE | Facility: OTHER | Age: 65
End: 2020-05-26

## 2020-05-26 ENCOUNTER — PATIENT MESSAGE (OUTPATIENT)
Dept: FAMILY MEDICINE | Facility: CLINIC | Age: 65
End: 2020-05-26

## 2020-05-26 DIAGNOSIS — Z12.11 SCREENING FOR COLON CANCER: Primary | ICD-10-CM

## 2020-05-26 NOTE — TELEPHONE ENCOUNTER
Dear Jose Luis Rendon    Your recent stool test for colon cancer screening was positive.  This does not mean that you have any cancer but just does mean that there was a bit of blood detected in the stool test.  While many times this could be a benign thing, it is recommended that you have a follow-up colonoscopy to check up on this.  I received notification that you just had orders put in today for this, and so you should be expecting a call from the GI department hopefully soon if not already regarding setting up a colonoscopy study.  If it has been a couple of weeks and you have not heard anything about setting up this test, please let us know        Deric Armstrong MD

## 2020-05-27 ENCOUNTER — TELEPHONE (OUTPATIENT)
Dept: GASTROENTEROLOGY | Facility: CLINIC | Age: 65
End: 2020-05-27

## 2020-05-28 ENCOUNTER — TELEPHONE (OUTPATIENT)
Dept: FAMILY MEDICINE | Facility: CLINIC | Age: 65
End: 2020-05-28

## 2020-05-28 ENCOUNTER — HOSPITAL ENCOUNTER (OUTPATIENT)
Dept: CARDIOLOGY | Facility: HOSPITAL | Age: 65
Discharge: HOME | End: 2020-05-28
Attending: FAMILY MEDICINE
Payer: MEDICARE

## 2020-05-28 VITALS — HEIGHT: 69 IN | BODY MASS INDEX: 32.14 KG/M2 | WEIGHT: 217 LBS

## 2020-05-28 DIAGNOSIS — R06.09 DOE (DYSPNEA ON EXERTION): Primary | ICD-10-CM

## 2020-05-28 DIAGNOSIS — E78.5 HYPERLIPIDEMIA, UNSPECIFIED HYPERLIPIDEMIA TYPE: ICD-10-CM

## 2020-05-28 DIAGNOSIS — I10 HYPERTENSION, UNSPECIFIED TYPE: ICD-10-CM

## 2020-05-28 DIAGNOSIS — R06.09 DOE (DYSPNEA ON EXERTION): ICD-10-CM

## 2020-05-28 LAB
BSA FOR ECHO PROCEDURE: 2.19 M2
BSA FOR ECHO PROCEDURE: 2.19 M2
CV STRESS BASE HR: 96 BPM
CV STRESS BASE HR: 96 BPM
DIASTOLIC BLOOD PRESSURE: 92 MMHG
DIASTOLIC BLOOD PRESSURE: 92 MMHG
OHS CV CPX 1 MINUTE RECOVERY HEART RATE: 116 BPM
OHS CV CPX 85 PERCENT MAX PREDICTED HEART RATE MALE: 131
OHS CV CPX 85 PERCENT MAX PREDICTED HEART RATE MALE: 131
OHS CV CPX ESTIMATED METS: 5
OHS CV CPX ESTIMATED METS: 5.4
OHS CV CPX MAX PREDICTED HEART RATE: 154
OHS CV CPX MAX PREDICTED HEART RATE: 154
OHS CV CPX PATIENT IS FEMALE: 0
OHS CV CPX PATIENT IS FEMALE: 0
OHS CV CPX PATIENT IS MALE: 1
OHS CV CPX PATIENT IS MALE: 1
OHS CV CPX PEAK DIASTOLIC BLOOD PRESSURE: 78 MMHG
OHS CV CPX PEAK DIASTOLIC BLOOD PRESSURE: 78 MMHG
OHS CV CPX PEAK HEAR RATE: 131 BPM
OHS CV CPX PEAK HEAR RATE: 131 BPM
OHS CV CPX PEAK RATE PRESSURE PRODUCT: NORMAL
OHS CV CPX PEAK RATE PRESSURE PRODUCT: NORMAL
OHS CV CPX PEAK SYSTOLIC BLOOD PRESSURE: 187 MMHG
OHS CV CPX PEAK SYSTOLIC BLOOD PRESSURE: 187 MMHG
OHS CV CPX PERCENT MAX PREDICTED HEART RATE ACHIEVED: 85
OHS CV CPX PERCENT MAX PREDICTED HEART RATE ACHIEVED: 85
OHS CV CPX RATE PRESSURE PRODUCT PRESENTING: NORMAL
OHS CV CPX RATE PRESSURE PRODUCT PRESENTING: NORMAL
STRESS ANGINA INDEX: 0
STRESS ECHO POST EXERCISE DUR MIN: 3 MINUTES
STRESS ECHO POST EXERCISE DUR MIN: 3 MINUTES
STRESS ECHO POST EXERCISE DUR SEC: 41 SECONDS
STRESS ECHO POST EXERCISE DUR SEC: 41 SECONDS
STRESS ECHO TARGET HR: 131 BPM
SYSTOLIC BLOOD PRESSURE: 123 MMHG
SYSTOLIC BLOOD PRESSURE: 123 MMHG

## 2020-05-28 PROCEDURE — 93018 EXERCISE STRESS - EKG (CUPID ONLY): ICD-10-PCS | Mod: ,,, | Performed by: INTERNAL MEDICINE

## 2020-05-28 PROCEDURE — 93017 CV STRESS TEST TRACING ONLY: CPT

## 2020-05-28 PROCEDURE — 93016 CV STRESS TEST SUPVJ ONLY: CPT | Mod: ,,, | Performed by: INTERNAL MEDICINE

## 2020-05-28 PROCEDURE — 93351 STRESS TTE COMPLETE: CPT

## 2020-05-28 PROCEDURE — 93016 EXERCISE STRESS - EKG (CUPID ONLY): ICD-10-PCS | Mod: ,,, | Performed by: INTERNAL MEDICINE

## 2020-05-28 PROCEDURE — 93018 CV STRESS TEST I&R ONLY: CPT | Mod: ,,, | Performed by: INTERNAL MEDICINE

## 2020-05-28 RX ORDER — AMLODIPINE BESYLATE 5 MG/1
TABLET ORAL
Qty: 30 TABLET | Refills: 11 | Status: SHIPPED | OUTPATIENT
Start: 2020-05-28 | End: 2021-08-04 | Stop reason: SDUPTHER

## 2020-05-28 RX ORDER — PRAVASTATIN SODIUM 20 MG/1
TABLET ORAL
Qty: 30 TABLET | Refills: 11 | Status: SHIPPED | OUTPATIENT
Start: 2020-05-28 | End: 2021-10-28

## 2020-05-28 NOTE — TELEPHONE ENCOUNTER
Received msg that pt couldn't do imaging portion of stress test so cardiology is requesting order for stress ecg to place instead for now. Order placed

## 2020-05-29 ENCOUNTER — PATIENT MESSAGE (OUTPATIENT)
Dept: FAMILY MEDICINE | Facility: CLINIC | Age: 65
End: 2020-05-29

## 2020-06-01 ENCOUNTER — PATIENT MESSAGE (OUTPATIENT)
Dept: FAMILY MEDICINE | Facility: CLINIC | Age: 65
End: 2020-06-01

## 2020-06-01 DIAGNOSIS — R06.09 DOE (DYSPNEA ON EXERTION): Primary | ICD-10-CM

## 2020-06-01 NOTE — TELEPHONE ENCOUNTER
Please inform patient that I heard from his cardiology staff that they were not able to perform the imaging portion of the stress test because of some shortness of breath issues.  The EKG portion was abnormal but nondiagnostic.  I will go ahead an order pharmacologic nuclear stress test to check the heart, that way they can perform the test without him going on a treadmill this is causing any problems.

## 2020-06-19 ENCOUNTER — LAB VISIT (OUTPATIENT)
Dept: PRIMARY CARE CLINIC | Facility: OTHER | Age: 65
End: 2020-06-19
Payer: MEDICARE

## 2020-06-19 DIAGNOSIS — Z11.59 SCREENING FOR VIRAL DISEASE: ICD-10-CM

## 2020-06-19 PROCEDURE — U0003 INFECTIOUS AGENT DETECTION BY NUCLEIC ACID (DNA OR RNA); SEVERE ACUTE RESPIRATORY SYNDROME CORONAVIRUS 2 (SARS-COV-2) (CORONAVIRUS DISEASE [COVID-19]), AMPLIFIED PROBE TECHNIQUE, MAKING USE OF HIGH THROUGHPUT TECHNOLOGIES AS DESCRIBED BY CMS-2020-01-R: HCPCS

## 2020-06-22 LAB — SARS-COV-2 RNA RESP QL NAA+PROBE: NOT DETECTED

## 2020-08-05 ENCOUNTER — TELEPHONE (OUTPATIENT)
Dept: GASTROENTEROLOGY | Facility: CLINIC | Age: 65
End: 2020-08-05

## 2021-02-27 ENCOUNTER — IMMUNIZATION (OUTPATIENT)
Dept: INTERNAL MEDICINE | Facility: CLINIC | Age: 66
End: 2021-02-27
Payer: MEDICARE

## 2021-02-27 DIAGNOSIS — Z23 NEED FOR VACCINATION: Primary | ICD-10-CM

## 2021-02-27 PROCEDURE — 91300 COVID-19, MRNA, LNP-S, PF, 30 MCG/0.3 ML DOSE VACCINE: CPT | Mod: PBBFAC | Performed by: FAMILY MEDICINE

## 2021-03-20 ENCOUNTER — IMMUNIZATION (OUTPATIENT)
Dept: INTERNAL MEDICINE | Facility: CLINIC | Age: 66
End: 2021-03-20
Payer: MEDICARE

## 2021-03-20 DIAGNOSIS — Z23 NEED FOR VACCINATION: Primary | ICD-10-CM

## 2021-03-20 PROCEDURE — 0002A COVID-19, MRNA, LNP-S, PF, 30 MCG/0.3 ML DOSE VACCINE: CPT | Mod: PBBFAC | Performed by: FAMILY MEDICINE

## 2021-03-20 PROCEDURE — 91300 COVID-19, MRNA, LNP-S, PF, 30 MCG/0.3 ML DOSE VACCINE: CPT | Mod: PBBFAC | Performed by: FAMILY MEDICINE

## 2021-07-06 ENCOUNTER — PATIENT MESSAGE (OUTPATIENT)
Dept: ADMINISTRATIVE | Facility: HOSPITAL | Age: 66
End: 2021-07-06

## 2021-08-04 RX ORDER — AMLODIPINE BESYLATE 5 MG/1
5 TABLET ORAL DAILY
Qty: 30 TABLET | Refills: 11 | Status: SHIPPED | OUTPATIENT
Start: 2021-08-04 | End: 2021-10-28 | Stop reason: SDUPTHER

## 2021-08-04 RX ORDER — PRAVASTATIN SODIUM 20 MG/1
20 TABLET ORAL DAILY
Qty: 30 TABLET | Refills: 11 | Status: CANCELLED | OUTPATIENT
Start: 2021-08-04

## 2021-10-28 ENCOUNTER — OFFICE VISIT (OUTPATIENT)
Dept: FAMILY MEDICINE | Facility: CLINIC | Age: 66
End: 2021-10-28
Payer: MEDICARE

## 2021-10-28 ENCOUNTER — TELEPHONE (OUTPATIENT)
Dept: FAMILY MEDICINE | Facility: CLINIC | Age: 66
End: 2021-10-28

## 2021-10-28 ENCOUNTER — LAB VISIT (OUTPATIENT)
Dept: LAB | Facility: HOSPITAL | Age: 66
End: 2021-10-28
Attending: FAMILY MEDICINE
Payer: MEDICARE

## 2021-10-28 VITALS
SYSTOLIC BLOOD PRESSURE: 134 MMHG | BODY MASS INDEX: 33.11 KG/M2 | OXYGEN SATURATION: 95 % | WEIGHT: 223.56 LBS | DIASTOLIC BLOOD PRESSURE: 78 MMHG | HEIGHT: 69 IN | HEART RATE: 89 BPM

## 2021-10-28 DIAGNOSIS — Z87.891 FORMER TOBACCO USE: ICD-10-CM

## 2021-10-28 DIAGNOSIS — E78.5 HYPERLIPIDEMIA, UNSPECIFIED HYPERLIPIDEMIA TYPE: ICD-10-CM

## 2021-10-28 DIAGNOSIS — F33.1 MODERATE EPISODE OF RECURRENT MAJOR DEPRESSIVE DISORDER: ICD-10-CM

## 2021-10-28 DIAGNOSIS — R73.09 ABNORMAL GLUCOSE: ICD-10-CM

## 2021-10-28 DIAGNOSIS — R06.02 SHORTNESS OF BREATH: ICD-10-CM

## 2021-10-28 DIAGNOSIS — B02.21 RAMSAY HUNT SYNDROME (GENICULATE HERPES ZOSTER): ICD-10-CM

## 2021-10-28 DIAGNOSIS — B35.1 ONYCHOMYCOSIS: ICD-10-CM

## 2021-10-28 DIAGNOSIS — I10 HYPERTENSION, UNSPECIFIED TYPE: ICD-10-CM

## 2021-10-28 DIAGNOSIS — R82.90 ABNORMAL URINE ODOR: ICD-10-CM

## 2021-10-28 DIAGNOSIS — Z12.5 SCREENING FOR MALIGNANT NEOPLASM OF PROSTATE: ICD-10-CM

## 2021-10-28 DIAGNOSIS — Z00.00 ROUTINE GENERAL MEDICAL EXAMINATION AT A HEALTH CARE FACILITY: Primary | ICD-10-CM

## 2021-10-28 DIAGNOSIS — Z86.73 OLD LACUNAR STROKE WITHOUT LATE EFFECT: ICD-10-CM

## 2021-10-28 DIAGNOSIS — Z00.00 ROUTINE GENERAL MEDICAL EXAMINATION AT A HEALTH CARE FACILITY: ICD-10-CM

## 2021-10-28 DIAGNOSIS — Z12.11 COLON CANCER SCREENING: ICD-10-CM

## 2021-10-28 DIAGNOSIS — Z23 NEED FOR VACCINATION: ICD-10-CM

## 2021-10-28 DIAGNOSIS — R06.09 DOE (DYSPNEA ON EXERTION): ICD-10-CM

## 2021-10-28 DIAGNOSIS — R19.5 POSITIVE FIT (FECAL IMMUNOCHEMICAL TEST): ICD-10-CM

## 2021-10-28 DIAGNOSIS — Z13.6 ENCOUNTER FOR ABDOMINAL AORTIC ANEURYSM (AAA) SCREENING: ICD-10-CM

## 2021-10-28 DIAGNOSIS — Z00.01 ENCOUNTER FOR GENERAL ADULT MEDICAL EXAMINATION WITH ABNORMAL FINDINGS: ICD-10-CM

## 2021-10-28 LAB
ALBUMIN SERPL BCP-MCNC: 4 G/DL (ref 3.5–5.2)
ALP SERPL-CCNC: 97 U/L (ref 55–135)
ALT SERPL W/O P-5'-P-CCNC: 24 U/L (ref 10–44)
ANION GAP SERPL CALC-SCNC: 9 MMOL/L (ref 8–16)
AST SERPL-CCNC: 14 U/L (ref 10–40)
BASOPHILS # BLD AUTO: 0.06 K/UL (ref 0–0.2)
BASOPHILS NFR BLD: 0.6 % (ref 0–1.9)
BILIRUB SERPL-MCNC: 0.9 MG/DL (ref 0.1–1)
BUN SERPL-MCNC: 14 MG/DL (ref 8–23)
CALCIUM SERPL-MCNC: 9.1 MG/DL (ref 8.7–10.5)
CHLORIDE SERPL-SCNC: 104 MMOL/L (ref 95–110)
CHOLEST SERPL-MCNC: 163 MG/DL (ref 120–199)
CHOLEST/HDLC SERPL: 4.3 {RATIO} (ref 2–5)
CO2 SERPL-SCNC: 26 MMOL/L (ref 23–29)
COMPLEXED PSA SERPL-MCNC: 0.36 NG/ML (ref 0–4)
CREAT SERPL-MCNC: 1.1 MG/DL (ref 0.5–1.4)
DIFFERENTIAL METHOD: NORMAL
EOSINOPHIL # BLD AUTO: 0.2 K/UL (ref 0–0.5)
EOSINOPHIL NFR BLD: 1.9 % (ref 0–8)
ERYTHROCYTE [DISTWIDTH] IN BLOOD BY AUTOMATED COUNT: 12.6 % (ref 11.5–14.5)
EST. GFR  (AFRICAN AMERICAN): >60 ML/MIN/1.73 M^2
EST. GFR  (NON AFRICAN AMERICAN): >60 ML/MIN/1.73 M^2
ESTIMATED AVG GLUCOSE: 163 MG/DL (ref 68–131)
GLUCOSE SERPL-MCNC: 152 MG/DL (ref 70–110)
HBA1C MFR BLD: 7.3 % (ref 4–5.6)
HCT VFR BLD AUTO: 49.7 % (ref 40–54)
HDLC SERPL-MCNC: 38 MG/DL (ref 40–75)
HDLC SERPL: 23.3 % (ref 20–50)
HGB BLD-MCNC: 16.3 G/DL (ref 14–18)
IMM GRANULOCYTES # BLD AUTO: 0.03 K/UL (ref 0–0.04)
IMM GRANULOCYTES NFR BLD AUTO: 0.3 % (ref 0–0.5)
LDLC SERPL CALC-MCNC: 101.2 MG/DL (ref 63–159)
LYMPHOCYTES # BLD AUTO: 2.4 K/UL (ref 1–4.8)
LYMPHOCYTES NFR BLD: 25.7 % (ref 18–48)
MCH RBC QN AUTO: 31 PG (ref 27–31)
MCHC RBC AUTO-ENTMCNC: 32.8 G/DL (ref 32–36)
MCV RBC AUTO: 95 FL (ref 82–98)
MONOCYTES # BLD AUTO: 0.7 K/UL (ref 0.3–1)
MONOCYTES NFR BLD: 7.6 % (ref 4–15)
NEUTROPHILS # BLD AUTO: 6 K/UL (ref 1.8–7.7)
NEUTROPHILS NFR BLD: 63.9 % (ref 38–73)
NONHDLC SERPL-MCNC: 125 MG/DL
NRBC BLD-RTO: 0 /100 WBC
PLATELET # BLD AUTO: 277 K/UL (ref 150–450)
PMV BLD AUTO: 9.6 FL (ref 9.2–12.9)
POTASSIUM SERPL-SCNC: 4.2 MMOL/L (ref 3.5–5.1)
PROT SERPL-MCNC: 7.1 G/DL (ref 6–8.4)
RBC # BLD AUTO: 5.26 M/UL (ref 4.6–6.2)
SODIUM SERPL-SCNC: 139 MMOL/L (ref 136–145)
TRIGL SERPL-MCNC: 119 MG/DL (ref 30–150)
TSH SERPL DL<=0.005 MIU/L-ACNC: 0.81 UIU/ML (ref 0.4–4)
WBC # BLD AUTO: 9.37 K/UL (ref 3.9–12.7)

## 2021-10-28 PROCEDURE — 80061 LIPID PANEL: CPT | Performed by: FAMILY MEDICINE

## 2021-10-28 PROCEDURE — G0008 PNEUMOCOCCAL POLYSACCHARIDE VACCINE 23-VALENT =>2YO SQ IM: ICD-10-PCS | Mod: S$GLB,,, | Performed by: FAMILY MEDICINE

## 2021-10-28 PROCEDURE — 3288F PR FALLS RISK ASSESSMENT DOCUMENTED: ICD-10-PCS | Mod: S$GLB,,, | Performed by: FAMILY MEDICINE

## 2021-10-28 PROCEDURE — 90732 PPSV23 VACC 2 YRS+ SUBQ/IM: CPT | Mod: S$GLB,,, | Performed by: FAMILY MEDICINE

## 2021-10-28 PROCEDURE — 85025 COMPLETE CBC W/AUTO DIFF WBC: CPT | Performed by: FAMILY MEDICINE

## 2021-10-28 PROCEDURE — 1126F AMNT PAIN NOTED NONE PRSNT: CPT | Mod: S$GLB,,, | Performed by: FAMILY MEDICINE

## 2021-10-28 PROCEDURE — 90662 IIV NO PRSV INCREASED AG IM: CPT | Mod: S$GLB,,, | Performed by: FAMILY MEDICINE

## 2021-10-28 PROCEDURE — 1101F PT FALLS ASSESS-DOCD LE1/YR: CPT | Mod: S$GLB,,, | Performed by: FAMILY MEDICINE

## 2021-10-28 PROCEDURE — 90662 FLU VACCINE - QUADRIVALENT - HIGH DOSE (65+) PRESERVATIVE FREE IM: ICD-10-PCS | Mod: S$GLB,,, | Performed by: FAMILY MEDICINE

## 2021-10-28 PROCEDURE — 1126F PR PAIN SEVERITY QUANTIFIED, NO PAIN PRESENT: ICD-10-PCS | Mod: S$GLB,,, | Performed by: FAMILY MEDICINE

## 2021-10-28 PROCEDURE — G0008 ADMIN INFLUENZA VIRUS VAC: HCPCS | Mod: S$GLB,,, | Performed by: FAMILY MEDICINE

## 2021-10-28 PROCEDURE — 1160F PR REVIEW ALL MEDS BY PRESCRIBER/CLIN PHARMACIST DOCUMENTED: ICD-10-PCS | Mod: S$GLB,,, | Performed by: FAMILY MEDICINE

## 2021-10-28 PROCEDURE — 3008F PR BODY MASS INDEX (BMI) DOCUMENTED: ICD-10-PCS | Mod: S$GLB,,, | Performed by: FAMILY MEDICINE

## 2021-10-28 PROCEDURE — 83036 HEMOGLOBIN GLYCOSYLATED A1C: CPT | Performed by: FAMILY MEDICINE

## 2021-10-28 PROCEDURE — 1159F MED LIST DOCD IN RCRD: CPT | Mod: S$GLB,,, | Performed by: FAMILY MEDICINE

## 2021-10-28 PROCEDURE — 3008F BODY MASS INDEX DOCD: CPT | Mod: S$GLB,,, | Performed by: FAMILY MEDICINE

## 2021-10-28 PROCEDURE — 99999 PR PBB SHADOW E&M-EST. PATIENT-LVL III: CPT | Mod: PBBFAC,,, | Performed by: FAMILY MEDICINE

## 2021-10-28 PROCEDURE — 84153 ASSAY OF PSA TOTAL: CPT | Performed by: FAMILY MEDICINE

## 2021-10-28 PROCEDURE — 3075F PR MOST RECENT SYSTOLIC BLOOD PRESS GE 130-139MM HG: ICD-10-PCS | Mod: S$GLB,,, | Performed by: FAMILY MEDICINE

## 2021-10-28 PROCEDURE — 36415 COLL VENOUS BLD VENIPUNCTURE: CPT | Performed by: FAMILY MEDICINE

## 2021-10-28 PROCEDURE — 3078F DIAST BP <80 MM HG: CPT | Mod: S$GLB,,, | Performed by: FAMILY MEDICINE

## 2021-10-28 PROCEDURE — 1101F PR PT FALLS ASSESS DOC 0-1 FALLS W/OUT INJ PAST YR: ICD-10-PCS | Mod: S$GLB,,, | Performed by: FAMILY MEDICINE

## 2021-10-28 PROCEDURE — 90732 PNEUMOCOCCAL POLYSACCHARIDE VACCINE 23-VALENT =>2YO SQ IM: ICD-10-PCS | Mod: S$GLB,,, | Performed by: FAMILY MEDICINE

## 2021-10-28 PROCEDURE — 80053 COMPREHEN METABOLIC PANEL: CPT | Performed by: FAMILY MEDICINE

## 2021-10-28 PROCEDURE — 1159F PR MEDICATION LIST DOCUMENTED IN MEDICAL RECORD: ICD-10-PCS | Mod: S$GLB,,, | Performed by: FAMILY MEDICINE

## 2021-10-28 PROCEDURE — G0009 ADMIN PNEUMOCOCCAL VACCINE: HCPCS | Mod: S$GLB,,, | Performed by: FAMILY MEDICINE

## 2021-10-28 PROCEDURE — 3075F SYST BP GE 130 - 139MM HG: CPT | Mod: S$GLB,,, | Performed by: FAMILY MEDICINE

## 2021-10-28 PROCEDURE — 84443 ASSAY THYROID STIM HORMONE: CPT | Performed by: FAMILY MEDICINE

## 2021-10-28 PROCEDURE — 3288F FALL RISK ASSESSMENT DOCD: CPT | Mod: S$GLB,,, | Performed by: FAMILY MEDICINE

## 2021-10-28 PROCEDURE — G0009 FLU VACCINE - QUADRIVALENT - HIGH DOSE (65+) PRESERVATIVE FREE IM: ICD-10-PCS | Mod: S$GLB,,, | Performed by: FAMILY MEDICINE

## 2021-10-28 PROCEDURE — 99999 PR PBB SHADOW E&M-EST. PATIENT-LVL III: ICD-10-PCS | Mod: PBBFAC,,, | Performed by: FAMILY MEDICINE

## 2021-10-28 PROCEDURE — 99214 OFFICE O/P EST MOD 30 MIN: CPT | Mod: 25,S$GLB,, | Performed by: FAMILY MEDICINE

## 2021-10-28 PROCEDURE — 99214 PR OFFICE/OUTPT VISIT, EST, LEVL IV, 30-39 MIN: ICD-10-PCS | Mod: 25,S$GLB,, | Performed by: FAMILY MEDICINE

## 2021-10-28 PROCEDURE — 1160F RVW MEDS BY RX/DR IN RCRD: CPT | Mod: S$GLB,,, | Performed by: FAMILY MEDICINE

## 2021-10-28 PROCEDURE — 3078F PR MOST RECENT DIASTOLIC BLOOD PRESSURE < 80 MM HG: ICD-10-PCS | Mod: S$GLB,,, | Performed by: FAMILY MEDICINE

## 2021-10-28 RX ORDER — ESCITALOPRAM OXALATE 10 MG/1
10 TABLET ORAL DAILY
Qty: 90 TABLET | Refills: 3 | Status: SHIPPED | OUTPATIENT
Start: 2021-10-28 | End: 2022-01-01

## 2021-10-28 RX ORDER — AMLODIPINE BESYLATE 5 MG/1
5 TABLET ORAL DAILY
Qty: 90 TABLET | Refills: 3 | Status: SHIPPED | OUTPATIENT
Start: 2021-10-28

## 2021-10-28 RX ORDER — ATORVASTATIN CALCIUM 40 MG/1
40 TABLET, FILM COATED ORAL DAILY
Qty: 90 TABLET | Refills: 3 | Status: SHIPPED | OUTPATIENT
Start: 2021-10-28 | End: 2022-01-01

## 2021-10-30 ENCOUNTER — PATIENT MESSAGE (OUTPATIENT)
Dept: FAMILY MEDICINE | Facility: CLINIC | Age: 66
End: 2021-10-30
Payer: MEDICARE

## 2021-10-30 DIAGNOSIS — R73.09 ABNORMAL GLUCOSE: Primary | ICD-10-CM

## 2021-10-30 RX ORDER — CEPHALEXIN 500 MG/1
500 CAPSULE ORAL 2 TIMES DAILY
Qty: 14 CAPSULE | Refills: 0 | Status: SHIPPED | OUTPATIENT
Start: 2021-10-30 | End: 2022-01-01

## 2021-11-01 ENCOUNTER — PATIENT MESSAGE (OUTPATIENT)
Dept: FAMILY MEDICINE | Facility: CLINIC | Age: 66
End: 2021-11-01
Payer: MEDICARE

## 2021-11-01 ENCOUNTER — HOSPITAL ENCOUNTER (OUTPATIENT)
Dept: RADIOLOGY | Facility: HOSPITAL | Age: 66
Discharge: HOME | End: 2021-11-01
Attending: FAMILY MEDICINE
Payer: MEDICARE

## 2021-11-01 DIAGNOSIS — Z87.891 FORMER TOBACCO USE: ICD-10-CM

## 2021-11-01 DIAGNOSIS — Z13.6 ENCOUNTER FOR ABDOMINAL AORTIC ANEURYSM (AAA) SCREENING: ICD-10-CM

## 2021-11-01 DIAGNOSIS — R91.8 MULTIPLE PULMONARY NODULES: Primary | ICD-10-CM

## 2021-11-01 DIAGNOSIS — Z87.891 PERSONAL HISTORY OF NICOTINE DEPENDENCE: ICD-10-CM

## 2021-11-01 DIAGNOSIS — R91.1 SOLITARY PULMONARY NODULE: ICD-10-CM

## 2021-11-01 PROCEDURE — 76775 US ABDOMINAL AORTA: ICD-10-PCS | Mod: 26,,, | Performed by: RADIOLOGY

## 2021-11-01 PROCEDURE — 71271 CT CHEST LUNG SCREENING LOW DOSE: ICD-10-PCS | Mod: 26,,, | Performed by: STUDENT IN AN ORGANIZED HEALTH CARE EDUCATION/TRAINING PROGRAM

## 2021-11-01 PROCEDURE — 76775 US EXAM ABDO BACK WALL LIM: CPT | Mod: 26,,, | Performed by: RADIOLOGY

## 2021-11-01 PROCEDURE — 76775 US EXAM ABDO BACK WALL LIM: CPT | Mod: TC

## 2021-11-01 PROCEDURE — 71271 CT THORAX LUNG CANCER SCR C-: CPT | Mod: TC

## 2021-11-01 PROCEDURE — 71271 CT THORAX LUNG CANCER SCR C-: CPT | Mod: 26,,, | Performed by: STUDENT IN AN ORGANIZED HEALTH CARE EDUCATION/TRAINING PROGRAM

## 2021-11-08 ENCOUNTER — PATIENT MESSAGE (OUTPATIENT)
Dept: FAMILY MEDICINE | Facility: CLINIC | Age: 66
End: 2021-11-08
Payer: MEDICARE

## 2021-11-08 ENCOUNTER — HOSPITAL ENCOUNTER (OUTPATIENT)
Dept: CARDIOLOGY | Facility: HOSPITAL | Age: 66
Discharge: HOME | End: 2021-11-08
Attending: FAMILY MEDICINE
Payer: MEDICARE

## 2021-11-08 ENCOUNTER — HOSPITAL ENCOUNTER (OUTPATIENT)
Dept: RADIOLOGY | Facility: HOSPITAL | Age: 66
Discharge: HOME | End: 2021-11-08
Attending: FAMILY MEDICINE
Payer: MEDICARE

## 2021-11-08 DIAGNOSIS — R06.02 SHORTNESS OF BREATH: ICD-10-CM

## 2021-11-08 DIAGNOSIS — R06.09 DOE (DYSPNEA ON EXERTION): ICD-10-CM

## 2021-11-08 LAB
CV STRESS BASE HR: 77 BPM
DIASTOLIC BLOOD PRESSURE: 84 MMHG
OHS CV CPX 85 PERCENT MAX PREDICTED HEART RATE MALE: 129
OHS CV CPX MAX PREDICTED HEART RATE: 152
OHS CV CPX PATIENT IS FEMALE: 0
OHS CV CPX PATIENT IS MALE: 1
OHS CV CPX PEAK DIASTOLIC BLOOD PRESSURE: 87 MMHG
OHS CV CPX PEAK HEAR RATE: 98 BPM
OHS CV CPX PEAK RATE PRESSURE PRODUCT: NORMAL
OHS CV CPX PEAK SYSTOLIC BLOOD PRESSURE: 143 MMHG
OHS CV CPX PERCENT MAX PREDICTED HEART RATE ACHIEVED: 64
OHS CV CPX RATE PRESSURE PRODUCT PRESENTING: NORMAL
SYSTOLIC BLOOD PRESSURE: 132 MMHG

## 2021-11-08 PROCEDURE — A9502 TC99M TETROFOSMIN: HCPCS

## 2021-11-08 PROCEDURE — 63600175 PHARM REV CODE 636 W HCPCS: Performed by: FAMILY MEDICINE

## 2021-11-08 PROCEDURE — 93018 NUCLEAR STRESS TEST (CUPID ONLY): ICD-10-PCS | Mod: ,,, | Performed by: INTERNAL MEDICINE

## 2021-11-08 PROCEDURE — 78452 NM MYOCARDIAL PERFUSION SPECT MULTI PHARM: ICD-10-PCS | Mod: 26,,, | Performed by: RADIOLOGY

## 2021-11-08 PROCEDURE — 78452 HT MUSCLE IMAGE SPECT MULT: CPT | Mod: 26,,, | Performed by: RADIOLOGY

## 2021-11-08 PROCEDURE — 93018 CV STRESS TEST I&R ONLY: CPT | Mod: ,,, | Performed by: INTERNAL MEDICINE

## 2021-11-08 PROCEDURE — 93016 CV STRESS TEST SUPVJ ONLY: CPT | Mod: ,,, | Performed by: INTERNAL MEDICINE

## 2021-11-08 PROCEDURE — 78452 HT MUSCLE IMAGE SPECT MULT: CPT | Mod: TC

## 2021-11-08 PROCEDURE — 93016 NUCLEAR STRESS TEST (CUPID ONLY): ICD-10-PCS | Mod: ,,, | Performed by: INTERNAL MEDICINE

## 2021-11-08 PROCEDURE — 93017 CV STRESS TEST TRACING ONLY: CPT

## 2021-11-08 RX ORDER — REGADENOSON 0.08 MG/ML
0.4 INJECTION, SOLUTION INTRAVENOUS ONCE
Status: COMPLETED | OUTPATIENT
Start: 2021-11-08 | End: 2021-11-08

## 2021-11-08 RX ADMIN — REGADENOSON 0.4 MG: 0.08 INJECTION, SOLUTION INTRAVENOUS at 12:11

## 2021-11-12 ENCOUNTER — PATIENT MESSAGE (OUTPATIENT)
Dept: FAMILY MEDICINE | Facility: CLINIC | Age: 66
End: 2021-11-12
Payer: MEDICARE

## 2022-01-01 ENCOUNTER — NURSE TRIAGE (OUTPATIENT)
Dept: ADMINISTRATIVE | Facility: CLINIC | Age: 67
End: 2022-01-01
Payer: MEDICARE

## 2022-01-01 ENCOUNTER — HOSPITAL ENCOUNTER (INPATIENT)
Facility: HOSPITAL | Age: 67
LOS: 10 days | DRG: 853 | End: 2022-11-17
Attending: EMERGENCY MEDICINE | Admitting: INTERNAL MEDICINE
Payer: MEDICARE

## 2022-01-01 VITALS
HEIGHT: 70 IN | HEART RATE: 123 BPM | RESPIRATION RATE: 19 BRPM | SYSTOLIC BLOOD PRESSURE: 151 MMHG | BODY MASS INDEX: 29.03 KG/M2 | WEIGHT: 202.81 LBS | DIASTOLIC BLOOD PRESSURE: 58 MMHG | OXYGEN SATURATION: 57 % | TEMPERATURE: 100 F

## 2022-01-01 DIAGNOSIS — R65.21 SEPTIC SHOCK: ICD-10-CM

## 2022-01-01 DIAGNOSIS — R09.02 HYPOXIA: ICD-10-CM

## 2022-01-01 DIAGNOSIS — M70.21 OLECRANON BURSITIS OF RIGHT ELBOW: ICD-10-CM

## 2022-01-01 DIAGNOSIS — R57.9 SHOCK, UNSPECIFIED: ICD-10-CM

## 2022-01-01 DIAGNOSIS — E08.10 DIABETIC KETOACIDOSIS WITHOUT COMA ASSOCIATED WITH DIABETES MELLITUS DUE TO UNDERLYING CONDITION: ICD-10-CM

## 2022-01-01 DIAGNOSIS — R78.81 BACTEREMIA DUE TO GROUP B STREPTOCOCCUS: ICD-10-CM

## 2022-01-01 DIAGNOSIS — I33.0 ACUTE BACTERIAL ENDOCARDITIS: ICD-10-CM

## 2022-01-01 DIAGNOSIS — R00.1 BRADYCARDIA: ICD-10-CM

## 2022-01-01 DIAGNOSIS — R00.1 SYMPTOMATIC BRADYCARDIA: ICD-10-CM

## 2022-01-01 DIAGNOSIS — I48.91 ATRIAL FIBRILLATION WITH RVR: ICD-10-CM

## 2022-01-01 DIAGNOSIS — A41.9 SEPTIC SHOCK: ICD-10-CM

## 2022-01-01 DIAGNOSIS — B95.61 MSSA BACTEREMIA: ICD-10-CM

## 2022-01-01 DIAGNOSIS — R78.81 MSSA BACTEREMIA: ICD-10-CM

## 2022-01-01 DIAGNOSIS — J43.2 CENTRILOBULAR EMPHYSEMA: ICD-10-CM

## 2022-01-01 DIAGNOSIS — J44.1 COPD WITH ACUTE EXACERBATION: ICD-10-CM

## 2022-01-01 DIAGNOSIS — J15.9 BACTERIAL PNEUMONIA: ICD-10-CM

## 2022-01-01 DIAGNOSIS — N17.0 ACUTE RENAL FAILURE WITH TUBULAR NECROSIS: ICD-10-CM

## 2022-01-01 DIAGNOSIS — M71.021 ABSCESS OF BURSA OF RIGHT ELBOW: ICD-10-CM

## 2022-01-01 DIAGNOSIS — N17.9 AKI (ACUTE KIDNEY INJURY): Primary | ICD-10-CM

## 2022-01-01 DIAGNOSIS — R06.02 SHORTNESS OF BREATH: ICD-10-CM

## 2022-01-01 DIAGNOSIS — B95.1 BACTEREMIA DUE TO GROUP B STREPTOCOCCUS: ICD-10-CM

## 2022-01-01 DIAGNOSIS — R78.81 BACTEREMIA: ICD-10-CM

## 2022-01-01 DIAGNOSIS — J96.01 ACUTE HYPOXEMIC RESPIRATORY FAILURE: ICD-10-CM

## 2022-01-01 DIAGNOSIS — I50.9 HEART FAILURE: ICD-10-CM

## 2022-01-01 DIAGNOSIS — R07.9 CHEST PAIN: ICD-10-CM

## 2022-01-01 LAB
ALBUMIN SERPL BCP-MCNC: 1.4 G/DL (ref 3.5–5.2)
ALBUMIN SERPL BCP-MCNC: 1.4 G/DL (ref 3.5–5.2)
ALBUMIN SERPL BCP-MCNC: 1.6 G/DL (ref 3.5–5.2)
ALBUMIN SERPL BCP-MCNC: 1.6 G/DL (ref 3.5–5.2)
ALBUMIN SERPL BCP-MCNC: 1.7 G/DL (ref 3.5–5.2)
ALBUMIN SERPL BCP-MCNC: 1.7 G/DL (ref 3.5–5.2)
ALBUMIN SERPL BCP-MCNC: 1.9 G/DL (ref 3.5–5.2)
ALBUMIN SERPL BCP-MCNC: 1.9 G/DL (ref 3.5–5.2)
ALBUMIN SERPL BCP-MCNC: 2 G/DL (ref 3.5–5.2)
ALBUMIN SERPL BCP-MCNC: 2.1 G/DL (ref 3.5–5.2)
ALBUMIN SERPL BCP-MCNC: 3.2 G/DL (ref 3.5–5.2)
ALLENS TEST: ABNORMAL
ALP SERPL-CCNC: 131 U/L (ref 55–135)
ALP SERPL-CCNC: 271 U/L (ref 55–135)
ALP SERPL-CCNC: 97 U/L (ref 55–135)
ALT SERPL W/O P-5'-P-CCNC: 1584 U/L (ref 10–44)
ALT SERPL W/O P-5'-P-CCNC: 47 U/L (ref 10–44)
ALT SERPL W/O P-5'-P-CCNC: 58 U/L (ref 10–44)
ANION GAP SERPL CALC-SCNC: 11 MMOL/L (ref 8–16)
ANION GAP SERPL CALC-SCNC: 12 MMOL/L (ref 8–16)
ANION GAP SERPL CALC-SCNC: 13 MMOL/L (ref 8–16)
ANION GAP SERPL CALC-SCNC: 14 MMOL/L (ref 8–16)
ANION GAP SERPL CALC-SCNC: 15 MMOL/L (ref 8–16)
ANION GAP SERPL CALC-SCNC: 16 MMOL/L (ref 8–16)
ANION GAP SERPL CALC-SCNC: 17 MMOL/L (ref 8–16)
ANION GAP SERPL CALC-SCNC: 18 MMOL/L (ref 8–16)
ANION GAP SERPL CALC-SCNC: 19 MMOL/L (ref 8–16)
ANION GAP SERPL CALC-SCNC: 20 MMOL/L (ref 8–16)
ANION GAP SERPL CALC-SCNC: 21 MMOL/L (ref 8–16)
ANION GAP SERPL CALC-SCNC: 22 MMOL/L (ref 8–16)
ANION GAP SERPL CALC-SCNC: 22 MMOL/L (ref 8–16)
ANION GAP SERPL CALC-SCNC: 24 MMOL/L (ref 8–16)
ANION GAP SERPL CALC-SCNC: 24 MMOL/L (ref 8–16)
ANION GAP SERPL CALC-SCNC: 31 MMOL/L (ref 8–16)
ANION GAP SERPL CALC-SCNC: 31 MMOL/L (ref 8–16)
ANISOCYTOSIS BLD QL SMEAR: SLIGHT
AORTIC ROOT ANNULUS: 3.41 CM
AORTIC VALVE CUSP SEPERATION: 1.91 CM
APPEARANCE FLD: NORMAL
APTT BLDCRRT: 48.4 SEC (ref 21–32)
AST SERPL-CCNC: 27 U/L (ref 10–40)
AST SERPL-CCNC: 59 U/L (ref 10–40)
AST SERPL-CCNC: ABNORMAL U/L (ref 10–40)
AV INDEX (PROSTH): 1.09
AV MEAN GRADIENT: 4 MMHG
AV PEAK GRADIENT: 6 MMHG
AV VALVE AREA: 3.57 CM2
AV VELOCITY RATIO: 1.04
B-OH-BUTYR BLD STRIP-SCNC: 4.1 MMOL/L (ref 0–0.5)
BACTERIA #/AREA URNS HPF: NORMAL /HPF
BACTERIA #/AREA URNS HPF: NORMAL /HPF
BACTERIA BLD CULT: ABNORMAL
BACTERIA BLD CULT: NORMAL
BACTERIA FLD AEROBE CULT: ABNORMAL
BACTERIA SPEC AEROBE CULT: ABNORMAL
BACTERIA UR CULT: ABNORMAL
BASOPHILS # BLD AUTO: 0.02 K/UL (ref 0–0.2)
BASOPHILS # BLD AUTO: 0.04 K/UL (ref 0–0.2)
BASOPHILS # BLD AUTO: 0.04 K/UL (ref 0–0.2)
BASOPHILS # BLD AUTO: 0.05 K/UL (ref 0–0.2)
BASOPHILS # BLD AUTO: 0.1 K/UL (ref 0–0.2)
BASOPHILS # BLD AUTO: 0.11 K/UL (ref 0–0.2)
BASOPHILS # BLD AUTO: 0.14 K/UL (ref 0–0.2)
BASOPHILS # BLD AUTO: ABNORMAL K/UL (ref 0–0.2)
BASOPHILS # BLD AUTO: ABNORMAL K/UL (ref 0–0.2)
BASOPHILS NFR BLD: 0 % (ref 0–1.9)
BASOPHILS NFR BLD: 0.2 % (ref 0–1.9)
BASOPHILS NFR BLD: 0.2 % (ref 0–1.9)
BASOPHILS NFR BLD: 0.3 % (ref 0–1.9)
BASOPHILS NFR BLD: 0.4 % (ref 0–1.9)
BASOPHILS NFR BLD: 0.4 % (ref 0–1.9)
BASOPHILS NFR BLD: 0.7 % (ref 0–1.9)
BASOPHILS NFR BLD: 0.8 % (ref 0–1.9)
BASOPHILS NFR FLD MANUAL: 0 %
BILIRUB SERPL-MCNC: 1.2 MG/DL (ref 0.1–1)
BILIRUB SERPL-MCNC: 1.6 MG/DL (ref 0.1–1)
BILIRUB SERPL-MCNC: 1.9 MG/DL (ref 0.1–1)
BILIRUB UR QL STRIP: NEGATIVE
BILIRUB UR QL STRIP: NEGATIVE
BNP SERPL-MCNC: 184 PG/ML (ref 0–99)
BNP SERPL-MCNC: 68 PG/ML (ref 0–99)
BODY FLD TYPE: NORMAL
BODY FLUID COMMENTS: 0
BSA FOR ECHO PROCEDURE: 2.11 M2
BSA FOR ECHO PROCEDURE: 2.11 M2
BUN SERPL-MCNC: 100 MG/DL (ref 8–23)
BUN SERPL-MCNC: 107 MG/DL (ref 8–23)
BUN SERPL-MCNC: 112 MG/DL (ref 8–23)
BUN SERPL-MCNC: 26 MG/DL (ref 8–23)
BUN SERPL-MCNC: 28 MG/DL (ref 8–23)
BUN SERPL-MCNC: 29 MG/DL (ref 8–23)
BUN SERPL-MCNC: 30 MG/DL (ref 8–23)
BUN SERPL-MCNC: 33 MG/DL (ref 8–23)
BUN SERPL-MCNC: 34 MG/DL (ref 8–23)
BUN SERPL-MCNC: 35 MG/DL (ref 8–23)
BUN SERPL-MCNC: 38 MG/DL (ref 8–23)
BUN SERPL-MCNC: 42 MG/DL (ref 8–23)
BUN SERPL-MCNC: 46 MG/DL (ref 8–23)
BUN SERPL-MCNC: 46 MG/DL (ref 8–23)
BUN SERPL-MCNC: 50 MG/DL (ref 8–23)
BUN SERPL-MCNC: 54 MG/DL (ref 8–23)
BUN SERPL-MCNC: 56 MG/DL (ref 8–23)
BUN SERPL-MCNC: 58 MG/DL (ref 8–23)
BUN SERPL-MCNC: 61 MG/DL (ref 8–23)
BUN SERPL-MCNC: 62 MG/DL (ref 8–23)
BUN SERPL-MCNC: 64 MG/DL (ref 8–23)
BUN SERPL-MCNC: 66 MG/DL (ref 8–23)
BUN SERPL-MCNC: 70 MG/DL (ref 8–23)
BUN SERPL-MCNC: 71 MG/DL (ref 8–23)
BUN SERPL-MCNC: 74 MG/DL (ref 8–23)
BUN SERPL-MCNC: 75 MG/DL (ref 8–23)
BUN SERPL-MCNC: 79 MG/DL (ref 8–23)
BUN SERPL-MCNC: 79 MG/DL (ref 8–23)
BUN SERPL-MCNC: 80 MG/DL (ref 8–23)
BUN SERPL-MCNC: 83 MG/DL (ref 8–23)
BUN SERPL-MCNC: 87 MG/DL (ref 8–23)
BUN SERPL-MCNC: 89 MG/DL (ref 8–23)
BUN SERPL-MCNC: 93 MG/DL (ref 8–23)
BUN SERPL-MCNC: 94 MG/DL (ref 8–23)
BUN SERPL-MCNC: 94 MG/DL (ref 8–23)
BUN SERPL-MCNC: 96 MG/DL (ref 8–23)
BUN SERPL-MCNC: 97 MG/DL (ref 8–23)
BUN SERPL-MCNC: 97 MG/DL (ref 8–23)
BURR CELLS BLD QL SMEAR: ABNORMAL
C3 SERPL-MCNC: 113 MG/DL (ref 50–180)
C4 SERPL-MCNC: 41 MG/DL (ref 11–44)
CALCIUM SERPL-MCNC: 10 MG/DL (ref 8.7–10.5)
CALCIUM SERPL-MCNC: 7.5 MG/DL (ref 8.7–10.5)
CALCIUM SERPL-MCNC: 7.5 MG/DL (ref 8.7–10.5)
CALCIUM SERPL-MCNC: 7.6 MG/DL (ref 8.7–10.5)
CALCIUM SERPL-MCNC: 7.7 MG/DL (ref 8.7–10.5)
CALCIUM SERPL-MCNC: 7.8 MG/DL (ref 8.7–10.5)
CALCIUM SERPL-MCNC: 7.9 MG/DL (ref 8.7–10.5)
CALCIUM SERPL-MCNC: 8 MG/DL (ref 8.7–10.5)
CALCIUM SERPL-MCNC: 8 MG/DL (ref 8.7–10.5)
CALCIUM SERPL-MCNC: 8.1 MG/DL (ref 8.7–10.5)
CALCIUM SERPL-MCNC: 8.1 MG/DL (ref 8.7–10.5)
CALCIUM SERPL-MCNC: 8.3 MG/DL (ref 8.7–10.5)
CALCIUM SERPL-MCNC: 8.6 MG/DL (ref 8.7–10.5)
CALCIUM SERPL-MCNC: 8.7 MG/DL (ref 8.7–10.5)
CALCIUM SERPL-MCNC: 8.8 MG/DL (ref 8.7–10.5)
CALCIUM SERPL-MCNC: 8.8 MG/DL (ref 8.7–10.5)
CALCIUM SERPL-MCNC: 8.9 MG/DL (ref 8.7–10.5)
CALCIUM SERPL-MCNC: 8.9 MG/DL (ref 8.7–10.5)
CALCIUM SERPL-MCNC: 9 MG/DL (ref 8.7–10.5)
CALCIUM SERPL-MCNC: 9 MG/DL (ref 8.7–10.5)
CALCIUM SERPL-MCNC: 9.2 MG/DL (ref 8.7–10.5)
CALCIUM SERPL-MCNC: 9.3 MG/DL (ref 8.7–10.5)
CALCIUM SERPL-MCNC: 9.4 MG/DL (ref 8.7–10.5)
CALCIUM SERPL-MCNC: 9.4 MG/DL (ref 8.7–10.5)
CALCIUM SERPL-MCNC: 9.5 MG/DL (ref 8.7–10.5)
CHLORIDE SERPL-SCNC: 100 MMOL/L (ref 95–110)
CHLORIDE SERPL-SCNC: 100 MMOL/L (ref 95–110)
CHLORIDE SERPL-SCNC: 107 MMOL/L (ref 95–110)
CHLORIDE SERPL-SCNC: 89 MMOL/L (ref 95–110)
CHLORIDE SERPL-SCNC: 92 MMOL/L (ref 95–110)
CHLORIDE SERPL-SCNC: 94 MMOL/L (ref 95–110)
CHLORIDE SERPL-SCNC: 94 MMOL/L (ref 95–110)
CHLORIDE SERPL-SCNC: 95 MMOL/L (ref 95–110)
CHLORIDE SERPL-SCNC: 96 MMOL/L (ref 95–110)
CHLORIDE SERPL-SCNC: 97 MMOL/L (ref 95–110)
CHLORIDE SERPL-SCNC: 98 MMOL/L (ref 95–110)
CHLORIDE SERPL-SCNC: 99 MMOL/L (ref 95–110)
CHOLEST SERPL-MCNC: 97 MG/DL (ref 120–199)
CHOLEST/HDLC SERPL: 7.5 {RATIO} (ref 2–5)
CLARITY UR: ABNORMAL
CLARITY UR: CLEAR
CO2 SERPL-SCNC: 12 MMOL/L (ref 23–29)
CO2 SERPL-SCNC: 12 MMOL/L (ref 23–29)
CO2 SERPL-SCNC: 14 MMOL/L (ref 23–29)
CO2 SERPL-SCNC: 15 MMOL/L (ref 23–29)
CO2 SERPL-SCNC: 16 MMOL/L (ref 23–29)
CO2 SERPL-SCNC: 17 MMOL/L (ref 23–29)
CO2 SERPL-SCNC: 18 MMOL/L (ref 23–29)
CO2 SERPL-SCNC: 19 MMOL/L (ref 23–29)
CO2 SERPL-SCNC: 20 MMOL/L (ref 23–29)
CO2 SERPL-SCNC: 21 MMOL/L (ref 23–29)
CO2 SERPL-SCNC: 21 MMOL/L (ref 23–29)
CO2 SERPL-SCNC: 24 MMOL/L (ref 23–29)
CO2 SERPL-SCNC: 25 MMOL/L (ref 23–29)
CO2 SERPL-SCNC: 5 MMOL/L (ref 23–29)
CO2 SERPL-SCNC: 5 MMOL/L (ref 23–29)
COLOR FLD: NORMAL
COLOR UR: COLORLESS
COLOR UR: YELLOW
CREAT SERPL-MCNC: 1.3 MG/DL (ref 0.5–1.4)
CREAT SERPL-MCNC: 1.3 MG/DL (ref 0.5–1.4)
CREAT SERPL-MCNC: 1.4 MG/DL (ref 0.5–1.4)
CREAT SERPL-MCNC: 1.4 MG/DL (ref 0.5–1.4)
CREAT SERPL-MCNC: 1.5 MG/DL (ref 0.5–1.4)
CREAT SERPL-MCNC: 1.5 MG/DL (ref 0.5–1.4)
CREAT SERPL-MCNC: 1.6 MG/DL (ref 0.5–1.4)
CREAT SERPL-MCNC: 1.6 MG/DL (ref 0.5–1.4)
CREAT SERPL-MCNC: 1.8 MG/DL (ref 0.5–1.4)
CREAT SERPL-MCNC: 2 MG/DL (ref 0.5–1.4)
CREAT SERPL-MCNC: 2.3 MG/DL (ref 0.5–1.4)
CREAT SERPL-MCNC: 2.8 MG/DL (ref 0.5–1.4)
CREAT SERPL-MCNC: 3 MG/DL (ref 0.5–1.4)
CREAT SERPL-MCNC: 3 MG/DL (ref 0.5–1.4)
CREAT SERPL-MCNC: 3.1 MG/DL (ref 0.5–1.4)
CREAT SERPL-MCNC: 3.2 MG/DL (ref 0.5–1.4)
CREAT SERPL-MCNC: 3.6 MG/DL (ref 0.5–1.4)
CREAT SERPL-MCNC: 3.6 MG/DL (ref 0.5–1.4)
CREAT SERPL-MCNC: 3.8 MG/DL (ref 0.5–1.4)
CREAT SERPL-MCNC: 3.8 MG/DL (ref 0.5–1.4)
CREAT SERPL-MCNC: 4.1 MG/DL (ref 0.5–1.4)
CREAT SERPL-MCNC: 4.5 MG/DL (ref 0.5–1.4)
CREAT SERPL-MCNC: 4.6 MG/DL (ref 0.5–1.4)
CREAT SERPL-MCNC: 4.6 MG/DL (ref 0.5–1.4)
CREAT SERPL-MCNC: 4.9 MG/DL (ref 0.5–1.4)
CREAT SERPL-MCNC: 4.9 MG/DL (ref 0.5–1.4)
CREAT SERPL-MCNC: 5.4 MG/DL (ref 0.5–1.4)
CREAT SERPL-MCNC: 5.5 MG/DL (ref 0.5–1.4)
CREAT SERPL-MCNC: 5.6 MG/DL (ref 0.5–1.4)
CREAT SERPL-MCNC: 5.7 MG/DL (ref 0.5–1.4)
CREAT SERPL-MCNC: 6 MG/DL (ref 0.5–1.4)
CREAT SERPL-MCNC: 6.1 MG/DL (ref 0.5–1.4)
CREAT SERPL-MCNC: 6.1 MG/DL (ref 0.5–1.4)
CREAT SERPL-MCNC: 6.3 MG/DL (ref 0.5–1.4)
CREAT SERPL-MCNC: 6.4 MG/DL (ref 0.5–1.4)
CREAT SERPL-MCNC: 6.4 MG/DL (ref 0.5–1.4)
CREAT SERPL-MCNC: 6.5 MG/DL (ref 0.5–1.4)
CREAT SERPL-MCNC: 6.5 MG/DL (ref 0.5–1.4)
CRP SERPL-MCNC: 212.6 MG/L (ref 0–8.2)
CTP QC/QA: YES
CTP QC/QA: YES
CV ECHO LV RWT: 0.61 CM
DACRYOCYTES BLD QL SMEAR: ABNORMAL
DELSYS: ABNORMAL
DIFFERENTIAL METHOD: ABNORMAL
DOP CALC AO PEAK VEL: 1.27 M/S
DOP CALC AO VTI: 16.1 CM
DOP CALC LVOT AREA: 3.3 CM2
DOP CALC LVOT DIAMETER: 2.04 CM
DOP CALC LVOT PEAK VEL: 1.32 M/S
DOP CALC LVOT STROKE VOLUME: 57.5 CM3
DOP CALC MV VTI: 17.5 CM
DOP CALCLVOT PEAK VEL VTI: 17.6 CM
E WAVE DECELERATION TIME: 194.34 MSEC
E/A RATIO: 0.69
E/E' RATIO: 10.5 M/S
ECHO LV POSTERIOR WALL: 1.42 CM (ref 0.6–1.1)
EJECTION FRACTION: 55 %
EJECTION FRACTION: 55 %
EOSINOPHIL # BLD AUTO: 0 K/UL (ref 0–0.5)
EOSINOPHIL # BLD AUTO: 0.1 K/UL (ref 0–0.5)
EOSINOPHIL # BLD AUTO: ABNORMAL K/UL (ref 0–0.5)
EOSINOPHIL # BLD AUTO: ABNORMAL K/UL (ref 0–0.5)
EOSINOPHIL NFR BLD: 0 % (ref 0–8)
EOSINOPHIL NFR BLD: 0.1 % (ref 0–8)
EOSINOPHIL NFR BLD: 0.2 % (ref 0–8)
EOSINOPHIL NFR BLD: 0.3 % (ref 0–8)
EOSINOPHIL NFR BLD: 0.4 % (ref 0–8)
EOSINOPHIL NFR BLD: 0.6 % (ref 0–8)
EOSINOPHIL NFR FLD MANUAL: 0 %
EP: 6
EP: 7
ERYTHROCYTE [DISTWIDTH] IN BLOOD BY AUTOMATED COUNT: 12.4 % (ref 11.5–14.5)
ERYTHROCYTE [DISTWIDTH] IN BLOOD BY AUTOMATED COUNT: 12.7 % (ref 11.5–14.5)
ERYTHROCYTE [DISTWIDTH] IN BLOOD BY AUTOMATED COUNT: 12.7 % (ref 11.5–14.5)
ERYTHROCYTE [DISTWIDTH] IN BLOOD BY AUTOMATED COUNT: 12.9 % (ref 11.5–14.5)
ERYTHROCYTE [DISTWIDTH] IN BLOOD BY AUTOMATED COUNT: 13.3 % (ref 11.5–14.5)
ERYTHROCYTE [DISTWIDTH] IN BLOOD BY AUTOMATED COUNT: 13.4 % (ref 11.5–14.5)
ERYTHROCYTE [DISTWIDTH] IN BLOOD BY AUTOMATED COUNT: 13.4 % (ref 11.5–14.5)
ERYTHROCYTE [DISTWIDTH] IN BLOOD BY AUTOMATED COUNT: 13.8 % (ref 11.5–14.5)
ERYTHROCYTE [DISTWIDTH] IN BLOOD BY AUTOMATED COUNT: 14.2 % (ref 11.5–14.5)
ERYTHROCYTE [DISTWIDTH] IN BLOOD BY AUTOMATED COUNT: 15.1 % (ref 11.5–14.5)
ERYTHROCYTE [DISTWIDTH] IN BLOOD BY AUTOMATED COUNT: 15.4 % (ref 11.5–14.5)
ERYTHROCYTE [DISTWIDTH] IN BLOOD BY AUTOMATED COUNT: 15.9 % (ref 11.5–14.5)
ERYTHROCYTE [SEDIMENTATION RATE] IN BLOOD BY PHOTOMETRIC METHOD: >120 MM/HR (ref 0–23)
ERYTHROCYTE [SEDIMENTATION RATE] IN BLOOD BY WESTERGREN METHOD: 14 MM/H
ERYTHROCYTE [SEDIMENTATION RATE] IN BLOOD BY WESTERGREN METHOD: 16 MM/H
ERYTHROCYTE [SEDIMENTATION RATE] IN BLOOD BY WESTERGREN METHOD: 16 MM/H
ERYTHROCYTE [SEDIMENTATION RATE] IN BLOOD BY WESTERGREN METHOD: 18 MM/H
ERYTHROCYTE [SEDIMENTATION RATE] IN BLOOD BY WESTERGREN METHOD: 20 MM/H
ERYTHROCYTE [SEDIMENTATION RATE] IN BLOOD BY WESTERGREN METHOD: 26 MM/H
ERYTHROCYTE [SEDIMENTATION RATE] IN BLOOD BY WESTERGREN METHOD: 28 MM/H
EST. GFR  (NO RACE VARIABLE): 10 ML/MIN/1.73 M^2
EST. GFR  (NO RACE VARIABLE): 10 ML/MIN/1.73 M^2
EST. GFR  (NO RACE VARIABLE): 11 ML/MIN/1.73 M^2
EST. GFR  (NO RACE VARIABLE): 12 ML/MIN/1.73 M^2
EST. GFR  (NO RACE VARIABLE): 12 ML/MIN/1.73 M^2
EST. GFR  (NO RACE VARIABLE): 13 ML/MIN/1.73 M^2
EST. GFR  (NO RACE VARIABLE): 15 ML/MIN/1.73 M^2
EST. GFR  (NO RACE VARIABLE): 16 ML/MIN/1.73 M^2
EST. GFR  (NO RACE VARIABLE): 16 ML/MIN/1.73 M^2
EST. GFR  (NO RACE VARIABLE): 18 ML/MIN/1.73 M^2
EST. GFR  (NO RACE VARIABLE): 18 ML/MIN/1.73 M^2
EST. GFR  (NO RACE VARIABLE): 20 ML/MIN/1.73 M^2
EST. GFR  (NO RACE VARIABLE): 21 ML/MIN/1.73 M^2
EST. GFR  (NO RACE VARIABLE): 22 ML/MIN/1.73 M^2
EST. GFR  (NO RACE VARIABLE): 22 ML/MIN/1.73 M^2
EST. GFR  (NO RACE VARIABLE): 24 ML/MIN/1.73 M^2
EST. GFR  (NO RACE VARIABLE): 30 ML/MIN/1.73 M^2
EST. GFR  (NO RACE VARIABLE): 35 ML/MIN/1.73 M^2
EST. GFR  (NO RACE VARIABLE): 40 ML/MIN/1.73 M^2
EST. GFR  (NO RACE VARIABLE): 46 ML/MIN/1.73 M^2
EST. GFR  (NO RACE VARIABLE): 46 ML/MIN/1.73 M^2
EST. GFR  (NO RACE VARIABLE): 50 ML/MIN/1.73 M^2
EST. GFR  (NO RACE VARIABLE): 50 ML/MIN/1.73 M^2
EST. GFR  (NO RACE VARIABLE): 54 ML/MIN/1.73 M^2
EST. GFR  (NO RACE VARIABLE): 54 ML/MIN/1.73 M^2
EST. GFR  (NO RACE VARIABLE): 59 ML/MIN/1.73 M^2
EST. GFR  (NO RACE VARIABLE): 59 ML/MIN/1.73 M^2
EST. GFR  (NO RACE VARIABLE): 9 ML/MIN/1.73 M^2
ESTIMATED AVG GLUCOSE: ABNORMAL MG/DL (ref 68–131)
FIO2: 100
FIO2: 35
FIO2: 50
FIO2: 55
FIO2: 70
FIO2: 70
FOLATE SERPL-MCNC: 9.4 NG/ML (ref 4–24)
FRACTIONAL SHORTENING: 25 % (ref 28–44)
GIANT PLATELETS BLD QL SMEAR: PRESENT
GLUCOSE SERPL-MCNC: 120 MG/DL (ref 70–110)
GLUCOSE SERPL-MCNC: 126 MG/DL (ref 70–110)
GLUCOSE SERPL-MCNC: 138 MG/DL (ref 70–110)
GLUCOSE SERPL-MCNC: 138 MG/DL (ref 70–110)
GLUCOSE SERPL-MCNC: 143 MG/DL (ref 70–110)
GLUCOSE SERPL-MCNC: 152 MG/DL (ref 70–110)
GLUCOSE SERPL-MCNC: 156 MG/DL (ref 70–110)
GLUCOSE SERPL-MCNC: 177 MG/DL (ref 70–110)
GLUCOSE SERPL-MCNC: 186 MG/DL (ref 70–110)
GLUCOSE SERPL-MCNC: 194 MG/DL (ref 70–110)
GLUCOSE SERPL-MCNC: 198 MG/DL (ref 70–110)
GLUCOSE SERPL-MCNC: 204 MG/DL (ref 70–110)
GLUCOSE SERPL-MCNC: 205 MG/DL (ref 70–110)
GLUCOSE SERPL-MCNC: 208 MG/DL (ref 70–110)
GLUCOSE SERPL-MCNC: 224 MG/DL (ref 70–110)
GLUCOSE SERPL-MCNC: 233 MG/DL (ref 70–110)
GLUCOSE SERPL-MCNC: 242 MG/DL (ref 70–110)
GLUCOSE SERPL-MCNC: 246 MG/DL (ref 70–110)
GLUCOSE SERPL-MCNC: 247 MG/DL (ref 70–110)
GLUCOSE SERPL-MCNC: 249 MG/DL (ref 70–110)
GLUCOSE SERPL-MCNC: 264 MG/DL (ref 70–110)
GLUCOSE SERPL-MCNC: 265 MG/DL (ref 70–110)
GLUCOSE SERPL-MCNC: 267 MG/DL (ref 70–110)
GLUCOSE SERPL-MCNC: 279 MG/DL (ref 70–110)
GLUCOSE SERPL-MCNC: 288 MG/DL (ref 70–110)
GLUCOSE SERPL-MCNC: 290 MG/DL (ref 70–110)
GLUCOSE SERPL-MCNC: 293 MG/DL (ref 70–110)
GLUCOSE SERPL-MCNC: 298 MG/DL (ref 70–110)
GLUCOSE SERPL-MCNC: 311 MG/DL (ref 70–110)
GLUCOSE SERPL-MCNC: 346 MG/DL (ref 70–110)
GLUCOSE SERPL-MCNC: 431 MG/DL (ref 70–110)
GLUCOSE SERPL-MCNC: 454 MG/DL (ref 70–110)
GLUCOSE SERPL-MCNC: 468 MG/DL (ref 70–110)
GLUCOSE SERPL-MCNC: 545 MG/DL (ref 70–110)
GLUCOSE SERPL-MCNC: 545 MG/DL (ref 70–110)
GLUCOSE SERPL-MCNC: 547 MG/DL (ref 70–110)
GLUCOSE SERPL-MCNC: 552 MG/DL (ref 70–110)
GLUCOSE SERPL-MCNC: 586 MG/DL (ref 70–110)
GLUCOSE SERPL-MCNC: 64 MG/DL (ref 70–110)
GLUCOSE SERPL-MCNC: 77 MG/DL (ref 70–110)
GLUCOSE UR QL STRIP: ABNORMAL
GLUCOSE UR QL STRIP: ABNORMAL
GRAM STN SPEC: ABNORMAL
HAV IGM SERPL QL IA: NORMAL
HBA1C MFR BLD: >14 % (ref 4–5.6)
HBV CORE IGM SERPL QL IA: NORMAL
HBV SURFACE AG SERPL QL IA: NORMAL
HCO3 UR-SCNC: 17.7 MMOL/L (ref 24–28)
HCO3 UR-SCNC: 20 MMOL/L (ref 24–28)
HCO3 UR-SCNC: 20.4 MMOL/L (ref 24–28)
HCO3 UR-SCNC: 20.4 MMOL/L (ref 24–28)
HCO3 UR-SCNC: 21 MMOL/L (ref 24–28)
HCO3 UR-SCNC: 21.4 MMOL/L (ref 24–28)
HCO3 UR-SCNC: 22.8 MMOL/L (ref 24–28)
HCO3 UR-SCNC: 22.8 MMOL/L (ref 24–28)
HCO3 UR-SCNC: 23 MMOL/L (ref 24–28)
HCO3 UR-SCNC: 23.6 MMOL/L (ref 24–28)
HCO3 UR-SCNC: 24.5 MMOL/L (ref 24–28)
HCO3 UR-SCNC: 24.7 MMOL/L (ref 24–28)
HCO3 UR-SCNC: 9.4 MMOL/L (ref 24–28)
HCT VFR BLD AUTO: 34.1 % (ref 40–54)
HCT VFR BLD AUTO: 34.7 % (ref 40–54)
HCT VFR BLD AUTO: 35.2 % (ref 40–54)
HCT VFR BLD AUTO: 37.3 % (ref 40–54)
HCT VFR BLD AUTO: 37.9 % (ref 40–54)
HCT VFR BLD AUTO: 41 % (ref 40–54)
HCT VFR BLD AUTO: 41.3 % (ref 40–54)
HCT VFR BLD AUTO: 42 % (ref 40–54)
HCT VFR BLD AUTO: 42.7 % (ref 40–54)
HCT VFR BLD AUTO: 44.7 % (ref 40–54)
HCT VFR BLD AUTO: 47.7 % (ref 40–54)
HCT VFR BLD AUTO: 49.7 % (ref 40–54)
HCT VFR BLD CALC: 38 %PCV (ref 36–54)
HCT VFR BLD CALC: 45 %PCV (ref 36–54)
HCT VFR BLD CALC: 48 %PCV (ref 36–54)
HCV AB SERPL QL IA: NORMAL
HDLC SERPL-MCNC: 13 MG/DL (ref 40–75)
HDLC SERPL: 13.4 % (ref 20–50)
HGB BLD-MCNC: 10.9 G/DL (ref 14–18)
HGB BLD-MCNC: 12.2 G/DL (ref 14–18)
HGB BLD-MCNC: 12.6 G/DL (ref 14–18)
HGB BLD-MCNC: 13 G/DL
HGB BLD-MCNC: 13.1 G/DL (ref 14–18)
HGB BLD-MCNC: 13.4 G/DL (ref 14–18)
HGB BLD-MCNC: 13.9 G/DL (ref 14–18)
HGB BLD-MCNC: 14.1 G/DL (ref 14–18)
HGB BLD-MCNC: 14.1 G/DL (ref 14–18)
HGB BLD-MCNC: 14.3 G/DL (ref 14–18)
HGB BLD-MCNC: 14.7 G/DL (ref 14–18)
HGB BLD-MCNC: 15 G/DL
HGB BLD-MCNC: 15.5 G/DL (ref 14–18)
HGB BLD-MCNC: 16 G/DL
HGB BLD-MCNC: 16.2 G/DL (ref 14–18)
HGB UR QL STRIP: ABNORMAL
HGB UR QL STRIP: ABNORMAL
HYALINE CASTS #/AREA URNS LPF: 0 /LPF
HYALINE CASTS #/AREA URNS LPF: 0 /LPF
IMM GRANULOCYTES # BLD AUTO: 0.08 K/UL (ref 0–0.04)
IMM GRANULOCYTES # BLD AUTO: 0.17 K/UL (ref 0–0.04)
IMM GRANULOCYTES # BLD AUTO: 0.25 K/UL (ref 0–0.04)
IMM GRANULOCYTES # BLD AUTO: 0.54 K/UL (ref 0–0.04)
IMM GRANULOCYTES # BLD AUTO: 0.67 K/UL (ref 0–0.04)
IMM GRANULOCYTES # BLD AUTO: 1.01 K/UL (ref 0–0.04)
IMM GRANULOCYTES # BLD AUTO: 1.23 K/UL (ref 0–0.04)
IMM GRANULOCYTES # BLD AUTO: ABNORMAL K/UL (ref 0–0.04)
IMM GRANULOCYTES NFR BLD AUTO: 0.7 % (ref 0–0.5)
IMM GRANULOCYTES NFR BLD AUTO: 1.2 % (ref 0–0.5)
IMM GRANULOCYTES NFR BLD AUTO: 2.4 % (ref 0–0.5)
IMM GRANULOCYTES NFR BLD AUTO: 3.7 % (ref 0–0.5)
IMM GRANULOCYTES NFR BLD AUTO: 3.9 % (ref 0–0.5)
IMM GRANULOCYTES NFR BLD AUTO: 4.3 % (ref 0–0.5)
IMM GRANULOCYTES NFR BLD AUTO: 4.4 % (ref 0–0.5)
IMM GRANULOCYTES NFR BLD AUTO: ABNORMAL % (ref 0–0.5)
INFLUENZA A, MOLECULAR: NOT DETECTED
INFLUENZA B, MOLECULAR: NOT DETECTED
INR PPP: 2 (ref 0.8–1.2)
INTERVENTRICULAR SEPTUM: 1.71 CM (ref 0.6–1.1)
IP: 12
IP: 14
IP: 17
IT: 0.8
KETONES UR QL STRIP: ABNORMAL
KETONES UR QL STRIP: ABNORMAL
L PNEUMO AG UR QL IA: NEGATIVE
LA MAJOR: 4.72 CM
LA MINOR: 5.4 CM
LA WIDTH: 3.5 CM
LACTATE SERPL-SCNC: 1 MMOL/L (ref 0.5–2.2)
LACTATE SERPL-SCNC: 2.1 MMOL/L (ref 0.5–2.2)
LACTATE SERPL-SCNC: 2.6 MMOL/L (ref 0.5–2.2)
LACTATE SERPL-SCNC: 3.2 MMOL/L (ref 0.5–2.2)
LACTATE SERPL-SCNC: 5.8 MMOL/L (ref 0.5–2.2)
LACTATE SERPL-SCNC: >12 MMOL/L (ref 0.5–2.2)
LDLC SERPL CALC-MCNC: 40.4 MG/DL (ref 63–159)
LEFT ATRIUM SIZE: 3.15 CM
LEFT ATRIUM VOLUME INDEX MOD: 19.9 ML/M2
LEFT ATRIUM VOLUME INDEX: 22.7 ML/M2
LEFT ATRIUM VOLUME MOD: 41.49 CM3
LEFT ATRIUM VOLUME: 47.2 CM3
LEFT INTERNAL DIMENSION IN SYSTOLE: 3.47 CM (ref 2.1–4)
LEFT VENTRICLE DIASTOLIC VOLUME INDEX: 47.63 ML/M2
LEFT VENTRICLE DIASTOLIC VOLUME: 99.07 ML
LEFT VENTRICLE MASS INDEX: 148 G/M2
LEFT VENTRICLE SYSTOLIC VOLUME INDEX: 24 ML/M2
LEFT VENTRICLE SYSTOLIC VOLUME: 49.92 ML
LEFT VENTRICULAR INTERNAL DIMENSION IN DIASTOLE: 4.63 CM (ref 3.5–6)
LEFT VENTRICULAR MASS: 306.81 G
LEUKOCYTE ESTERASE UR QL STRIP: ABNORMAL
LEUKOCYTE ESTERASE UR QL STRIP: NEGATIVE
LV LATERAL E/E' RATIO: 10.5 M/S
LV SEPTAL E/E' RATIO: 10.5 M/S
LVOT MG: 2.69 MMHG
LVOT MV: 0.68 CM/S
LYMPHOCYTES # BLD AUTO: 0.6 K/UL (ref 1–4.8)
LYMPHOCYTES # BLD AUTO: 0.8 K/UL (ref 1–4.8)
LYMPHOCYTES # BLD AUTO: 0.8 K/UL (ref 1–4.8)
LYMPHOCYTES # BLD AUTO: 0.9 K/UL (ref 1–4.8)
LYMPHOCYTES # BLD AUTO: 0.9 K/UL (ref 1–4.8)
LYMPHOCYTES # BLD AUTO: 1 K/UL (ref 1–4.8)
LYMPHOCYTES # BLD AUTO: 1.2 K/UL (ref 1–4.8)
LYMPHOCYTES # BLD AUTO: ABNORMAL K/UL (ref 1–4.8)
LYMPHOCYTES # BLD AUTO: ABNORMAL K/UL (ref 1–4.8)
LYMPHOCYTES NFR BLD: 10 % (ref 18–48)
LYMPHOCYTES NFR BLD: 2 % (ref 18–48)
LYMPHOCYTES NFR BLD: 2 % (ref 18–48)
LYMPHOCYTES NFR BLD: 3 % (ref 18–48)
LYMPHOCYTES NFR BLD: 3 % (ref 18–48)
LYMPHOCYTES NFR BLD: 4.3 % (ref 18–48)
LYMPHOCYTES NFR BLD: 5 % (ref 18–48)
LYMPHOCYTES NFR BLD: 5.5 % (ref 18–48)
LYMPHOCYTES NFR BLD: 6.1 % (ref 18–48)
LYMPHOCYTES NFR BLD: 6.2 % (ref 18–48)
LYMPHOCYTES NFR BLD: 6.3 % (ref 18–48)
LYMPHOCYTES NFR BLD: 8 % (ref 18–48)
LYMPHOCYTES NFR FLD MANUAL: 15 %
MAGNESIUM SERPL-MCNC: 2.2 MG/DL (ref 1.6–2.6)
MAGNESIUM SERPL-MCNC: 2.3 MG/DL (ref 1.6–2.6)
MAGNESIUM SERPL-MCNC: 2.6 MG/DL (ref 1.6–2.6)
MAGNESIUM SERPL-MCNC: 2.6 MG/DL (ref 1.6–2.6)
MAGNESIUM SERPL-MCNC: 2.7 MG/DL (ref 1.6–2.6)
MAGNESIUM SERPL-MCNC: 2.8 MG/DL (ref 1.6–2.6)
MAGNESIUM SERPL-MCNC: 2.9 MG/DL (ref 1.6–2.6)
MAGNESIUM SERPL-MCNC: 3 MG/DL (ref 1.6–2.6)
MAGNESIUM SERPL-MCNC: 3.2 MG/DL (ref 1.6–2.6)
MCH RBC QN AUTO: 29.7 PG (ref 27–31)
MCH RBC QN AUTO: 30.3 PG (ref 27–31)
MCH RBC QN AUTO: 30.3 PG (ref 27–31)
MCH RBC QN AUTO: 30.5 PG (ref 27–31)
MCH RBC QN AUTO: 30.5 PG (ref 27–31)
MCH RBC QN AUTO: 30.6 PG (ref 27–31)
MCH RBC QN AUTO: 30.6 PG (ref 27–31)
MCH RBC QN AUTO: 30.7 PG (ref 27–31)
MCH RBC QN AUTO: 30.8 PG (ref 27–31)
MCH RBC QN AUTO: 31 PG (ref 27–31)
MCH RBC QN AUTO: 31 PG (ref 27–31)
MCH RBC QN AUTO: 31.2 PG (ref 27–31)
MCHC RBC AUTO-ENTMCNC: 32 G/DL (ref 32–36)
MCHC RBC AUTO-ENTMCNC: 32.5 G/DL (ref 32–36)
MCHC RBC AUTO-ENTMCNC: 32.6 G/DL (ref 32–36)
MCHC RBC AUTO-ENTMCNC: 32.9 G/DL (ref 32–36)
MCHC RBC AUTO-ENTMCNC: 33 G/DL (ref 32–36)
MCHC RBC AUTO-ENTMCNC: 33.1 G/DL (ref 32–36)
MCHC RBC AUTO-ENTMCNC: 34.1 G/DL (ref 32–36)
MCHC RBC AUTO-ENTMCNC: 34.9 G/DL (ref 32–36)
MCHC RBC AUTO-ENTMCNC: 35.1 G/DL (ref 32–36)
MCHC RBC AUTO-ENTMCNC: 35.2 G/DL (ref 32–36)
MCHC RBC AUTO-ENTMCNC: 35.4 G/DL (ref 32–36)
MCHC RBC AUTO-ENTMCNC: 35.8 G/DL (ref 32–36)
MCV RBC AUTO: 87 FL (ref 82–98)
MCV RBC AUTO: 88 FL (ref 82–98)
MCV RBC AUTO: 89 FL (ref 82–98)
MCV RBC AUTO: 89 FL (ref 82–98)
MCV RBC AUTO: 90 FL (ref 82–98)
MCV RBC AUTO: 92 FL (ref 82–98)
MCV RBC AUTO: 92 FL (ref 82–98)
MCV RBC AUTO: 94 FL (ref 82–98)
MCV RBC AUTO: 94 FL (ref 82–98)
MCV RBC AUTO: 96 FL (ref 82–98)
MESOTHL CELL NFR FLD MANUAL: 0 %
MICROSCOPIC COMMENT: NORMAL
MICROSCOPIC COMMENT: NORMAL
MIN VOL: 15.1
MIN VOL: 6.79
MIN VOL: 7.61
MIN VOL: 8.75
MODE: ABNORMAL
MONOCYTES # BLD AUTO: 0.6 K/UL (ref 0.3–1)
MONOCYTES # BLD AUTO: 0.7 K/UL (ref 0.3–1)
MONOCYTES # BLD AUTO: 0.8 K/UL (ref 0.3–1)
MONOCYTES # BLD AUTO: 1.2 K/UL (ref 0.3–1)
MONOCYTES # BLD AUTO: 1.6 K/UL (ref 0.3–1)
MONOCYTES # BLD AUTO: 1.6 K/UL (ref 0.3–1)
MONOCYTES # BLD AUTO: 2 K/UL (ref 0.3–1)
MONOCYTES # BLD AUTO: ABNORMAL K/UL (ref 0.3–1)
MONOCYTES # BLD AUTO: ABNORMAL K/UL (ref 0.3–1)
MONOCYTES NFR BLD: 0 % (ref 4–15)
MONOCYTES NFR BLD: 10.4 % (ref 4–15)
MONOCYTES NFR BLD: 12 % (ref 4–15)
MONOCYTES NFR BLD: 13.1 % (ref 4–15)
MONOCYTES NFR BLD: 2 % (ref 4–15)
MONOCYTES NFR BLD: 4 % (ref 4–15)
MONOCYTES NFR BLD: 4.3 % (ref 4–15)
MONOCYTES NFR BLD: 4.9 % (ref 4–15)
MONOCYTES NFR BLD: 5.3 % (ref 4–15)
MONOCYTES NFR BLD: 6.2 % (ref 4–15)
MONOCYTES NFR BLD: 6.6 % (ref 4–15)
MONOCYTES NFR BLD: 9 % (ref 4–15)
MONOS+MACROS NFR FLD MANUAL: 0 %
MRSA ID BY PCR: NEGATIVE
MRSA ID BY PCR: NEGATIVE
MV MEAN GRADIENT: 2 MMHG
MV PEAK A VEL: 0.91 M/S
MV PEAK E VEL: 0.63 M/S
MV PEAK GRADIENT: 3 MMHG
MV STENOSIS PRESSURE HALF TIME: 56.36 MS
MV VALVE AREA BY CONTINUITY EQUATION: 3.29 CM2
MV VALVE AREA P 1/2 METHOD: 3.9 CM2
MYELOCYTES NFR BLD MANUAL: 1 %
MYELOCYTES NFR BLD MANUAL: 2 %
NEUTROPHILS # BLD AUTO: 11.6 K/UL (ref 1.8–7.7)
NEUTROPHILS # BLD AUTO: 12.2 K/UL (ref 1.8–7.7)
NEUTROPHILS # BLD AUTO: 12.7 K/UL (ref 1.8–7.7)
NEUTROPHILS # BLD AUTO: 20 K/UL (ref 1.8–7.7)
NEUTROPHILS # BLD AUTO: 27.3 K/UL (ref 1.8–7.7)
NEUTROPHILS # BLD AUTO: 8.7 K/UL (ref 1.8–7.7)
NEUTROPHILS # BLD AUTO: 9.7 K/UL (ref 1.8–7.7)
NEUTROPHILS NFR BLD: 76.8 % (ref 38–73)
NEUTROPHILS NFR BLD: 79.7 % (ref 38–73)
NEUTROPHILS NFR BLD: 83.9 % (ref 38–73)
NEUTROPHILS NFR BLD: 83.9 % (ref 38–73)
NEUTROPHILS NFR BLD: 84 % (ref 38–73)
NEUTROPHILS NFR BLD: 84 % (ref 38–73)
NEUTROPHILS NFR BLD: 86 % (ref 38–73)
NEUTROPHILS NFR BLD: 86 % (ref 38–73)
NEUTROPHILS NFR BLD: 86.5 % (ref 38–73)
NEUTROPHILS NFR BLD: 87.8 % (ref 38–73)
NEUTROPHILS NFR BLD: 92 % (ref 38–73)
NEUTROPHILS NFR BLD: 93 % (ref 38–73)
NEUTROPHILS NFR FLD MANUAL: 85 %
NEUTS BAND NFR BLD MANUAL: 1 %
NEUTS BAND NFR BLD MANUAL: 2 %
NEUTS BAND NFR BLD MANUAL: 6 %
NITRITE UR QL STRIP: NEGATIVE
NITRITE UR QL STRIP: NEGATIVE
NONHDLC SERPL-MCNC: 84 MG/DL
NRBC BLD-RTO: 0 /100 WBC
OSMOLALITY SERPL: 314 MOSM/KG (ref 280–300)
OSMOLALITY UR: 373 MOSM/KG (ref 50–1200)
OTHER CELLS FLD MANUAL: 0 %
PCO2 BLDA: 31.4 MMHG (ref 35–45)
PCO2 BLDA: 35.9 MMHG (ref 35–45)
PCO2 BLDA: 43.9 MMHG (ref 35–45)
PCO2 BLDA: 44.5 MMHG (ref 35–45)
PCO2 BLDA: 49.4 MMHG (ref 35–45)
PCO2 BLDA: 50.6 MMHG (ref 35–45)
PCO2 BLDA: 52.7 MMHG (ref 35–45)
PCO2 BLDA: 54.3 MMHG (ref 35–45)
PCO2 BLDA: 56 MMHG (ref 35–45)
PCO2 BLDA: 57.7 MMHG (ref 35–45)
PCO2 BLDA: 58.1 MMHG (ref 35–45)
PCO2 BLDA: 58.7 MMHG (ref 35–45)
PCO2 BLDA: 63.7 MMHG (ref 35–45)
PEEP: 10
PEEP: 10
PEEP: 5
PEEP: 7
PEEP: 8
PH SMN: 6.86 [PH] (ref 7.35–7.45)
PH SMN: 7.15 [PH] (ref 7.35–7.45)
PH SMN: 7.17 [PH] (ref 7.35–7.45)
PH SMN: 7.2 [PH] (ref 7.35–7.45)
PH SMN: 7.2 [PH] (ref 7.35–7.45)
PH SMN: 7.22 [PH] (ref 7.35–7.45)
PH SMN: 7.25 [PH] (ref 7.35–7.45)
PH SMN: 7.25 [PH] (ref 7.35–7.45)
PH SMN: 7.26 [PH] (ref 7.35–7.45)
PH SMN: 7.27 [PH] (ref 7.35–7.45)
PH SMN: 7.33 [PH] (ref 7.35–7.45)
PH SMN: 7.36 [PH] (ref 7.35–7.45)
PH SMN: 7.38 [PH] (ref 7.35–7.45)
PH UR STRIP: 6 [PH] (ref 5–8)
PH UR STRIP: 6 [PH] (ref 5–8)
PHOSPHATE SERPL-MCNC: 2 MG/DL (ref 2.7–4.5)
PHOSPHATE SERPL-MCNC: 2.8 MG/DL (ref 2.7–4.5)
PHOSPHATE SERPL-MCNC: 3.9 MG/DL (ref 2.7–4.5)
PHOSPHATE SERPL-MCNC: 4.1 MG/DL (ref 2.7–4.5)
PHOSPHATE SERPL-MCNC: 6.2 MG/DL (ref 2.7–4.5)
PHOSPHATE SERPL-MCNC: 6.4 MG/DL (ref 2.7–4.5)
PHOSPHATE SERPL-MCNC: 6.6 MG/DL (ref 2.7–4.5)
PHOSPHATE SERPL-MCNC: 6.6 MG/DL (ref 2.7–4.5)
PHOSPHATE SERPL-MCNC: 7.8 MG/DL (ref 2.7–4.5)
PHOSPHATE SERPL-MCNC: 8.8 MG/DL (ref 2.7–4.5)
PHOSPHATE SERPL-MCNC: 9.5 MG/DL (ref 2.7–4.5)
PHOSPHATE SERPL-MCNC: >15 MG/DL (ref 2.7–4.5)
PHOSPHATE SERPL-MCNC: >15 MG/DL (ref 2.7–4.5)
PIP: 32
PIP: 37
PISA TR MAX VEL: 2.57 M/S
PLATELET # BLD AUTO: 113 K/UL (ref 150–450)
PLATELET # BLD AUTO: 117 K/UL (ref 150–450)
PLATELET # BLD AUTO: 120 K/UL (ref 150–450)
PLATELET # BLD AUTO: 131 K/UL (ref 150–450)
PLATELET # BLD AUTO: 140 K/UL (ref 150–450)
PLATELET # BLD AUTO: 163 K/UL (ref 150–450)
PLATELET # BLD AUTO: 177 K/UL (ref 150–450)
PLATELET # BLD AUTO: 209 K/UL (ref 150–450)
PLATELET # BLD AUTO: 223 K/UL (ref 150–450)
PLATELET # BLD AUTO: 58 K/UL (ref 150–450)
PLATELET # BLD AUTO: 86 K/UL (ref 150–450)
PLATELET # BLD AUTO: 90 K/UL (ref 150–450)
PLATELET BLD QL SMEAR: ABNORMAL
PMV BLD AUTO: 10.7 FL (ref 9.2–12.9)
PMV BLD AUTO: 11.3 FL (ref 9.2–12.9)
PMV BLD AUTO: 11.7 FL (ref 9.2–12.9)
PMV BLD AUTO: 11.9 FL (ref 9.2–12.9)
PMV BLD AUTO: 12 FL (ref 9.2–12.9)
PMV BLD AUTO: 12.2 FL (ref 9.2–12.9)
PMV BLD AUTO: 12.7 FL (ref 9.2–12.9)
PMV BLD AUTO: 12.7 FL (ref 9.2–12.9)
PMV BLD AUTO: 12.8 FL (ref 9.2–12.9)
PMV BLD AUTO: 12.9 FL (ref 9.2–12.9)
PMV BLD AUTO: 12.9 FL (ref 9.2–12.9)
PMV BLD AUTO: 13 FL (ref 9.2–12.9)
PO2 BLDA: 114 MMHG (ref 80–100)
PO2 BLDA: 233 MMHG (ref 80–100)
PO2 BLDA: 37 MMHG (ref 40–60)
PO2 BLDA: 60 MMHG (ref 80–100)
PO2 BLDA: 61 MMHG (ref 80–100)
PO2 BLDA: 61 MMHG (ref 80–100)
PO2 BLDA: 69 MMHG (ref 80–100)
PO2 BLDA: 70 MMHG (ref 80–100)
PO2 BLDA: 70 MMHG (ref 80–100)
PO2 BLDA: 71 MMHG (ref 80–100)
PO2 BLDA: 76 MMHG (ref 80–100)
PO2 BLDA: 78 MMHG (ref 80–100)
PO2 BLDA: 85 MMHG (ref 80–100)
POC BE: -24 MMOL/L
POC BE: -3 MMOL/L
POC BE: -4 MMOL/L
POC BE: -5 MMOL/L
POC BE: -6 MMOL/L
POC BE: -7 MMOL/L
POC BE: -8 MMOL/L
POC BE: -8 MMOL/L
POC BE: -9 MMOL/L
POC MOLECULAR INFLUENZA A AGN: NEGATIVE
POC MOLECULAR INFLUENZA B AGN: NEGATIVE
POC SATURATED O2: 57 % (ref 95–100)
POC SATURATED O2: 84 % (ref 95–100)
POC SATURATED O2: 86 % (ref 95–100)
POC SATURATED O2: 86 % (ref 95–100)
POC SATURATED O2: 90 % (ref 95–100)
POC SATURATED O2: 90 % (ref 95–100)
POC SATURATED O2: 92 % (ref 95–100)
POC SATURATED O2: 92 % (ref 95–100)
POC SATURATED O2: 93 % (ref 95–100)
POC SATURATED O2: 94 % (ref 95–100)
POC SATURATED O2: 94 % (ref 95–100)
POC SATURATED O2: 98 % (ref 95–100)
POC SATURATED O2: 99 % (ref 95–100)
POC TCO2: 11 MMOL/L (ref 23–27)
POC TCO2: 19 MMOL/L (ref 23–27)
POC TCO2: 22 MMOL/L (ref 23–27)
POC TCO2: 23 MMOL/L (ref 23–27)
POC TCO2: 24 MMOL/L (ref 23–27)
POC TCO2: 24 MMOL/L (ref 23–27)
POC TCO2: 25 MMOL/L (ref 23–27)
POC TCO2: 25 MMOL/L (ref 24–29)
POC TCO2: 26 MMOL/L (ref 23–27)
POC TCO2: 27 MMOL/L (ref 23–27)
POCT GLUCOSE: 100 MG/DL (ref 70–110)
POCT GLUCOSE: 122 MG/DL (ref 70–110)
POCT GLUCOSE: 135 MG/DL (ref 70–110)
POCT GLUCOSE: 135 MG/DL (ref 70–110)
POCT GLUCOSE: 148 MG/DL (ref 70–110)
POCT GLUCOSE: 150 MG/DL (ref 70–110)
POCT GLUCOSE: 155 MG/DL (ref 70–110)
POCT GLUCOSE: 155 MG/DL (ref 70–110)
POCT GLUCOSE: 156 MG/DL (ref 70–110)
POCT GLUCOSE: 163 MG/DL (ref 70–110)
POCT GLUCOSE: 166 MG/DL (ref 70–110)
POCT GLUCOSE: 166 MG/DL (ref 70–110)
POCT GLUCOSE: 170 MG/DL (ref 70–110)
POCT GLUCOSE: 172 MG/DL (ref 70–110)
POCT GLUCOSE: 174 MG/DL (ref 70–110)
POCT GLUCOSE: 174 MG/DL (ref 70–110)
POCT GLUCOSE: 176 MG/DL (ref 70–110)
POCT GLUCOSE: 177 MG/DL (ref 70–110)
POCT GLUCOSE: 182 MG/DL (ref 70–110)
POCT GLUCOSE: 184 MG/DL (ref 70–110)
POCT GLUCOSE: 184 MG/DL (ref 70–110)
POCT GLUCOSE: 189 MG/DL (ref 70–110)
POCT GLUCOSE: 205 MG/DL (ref 70–110)
POCT GLUCOSE: 206 MG/DL (ref 70–110)
POCT GLUCOSE: 213 MG/DL (ref 70–110)
POCT GLUCOSE: 223 MG/DL (ref 70–110)
POCT GLUCOSE: 225 MG/DL (ref 70–110)
POCT GLUCOSE: 227 MG/DL (ref 70–110)
POCT GLUCOSE: 229 MG/DL (ref 70–110)
POCT GLUCOSE: 233 MG/DL (ref 70–110)
POCT GLUCOSE: 233 MG/DL (ref 70–110)
POCT GLUCOSE: 236 MG/DL (ref 70–110)
POCT GLUCOSE: 240 MG/DL (ref 70–110)
POCT GLUCOSE: 243 MG/DL (ref 70–110)
POCT GLUCOSE: 244 MG/DL (ref 70–110)
POCT GLUCOSE: 248 MG/DL (ref 70–110)
POCT GLUCOSE: 248 MG/DL (ref 70–110)
POCT GLUCOSE: 249 MG/DL (ref 70–110)
POCT GLUCOSE: 253 MG/DL (ref 70–110)
POCT GLUCOSE: 257 MG/DL (ref 70–110)
POCT GLUCOSE: 258 MG/DL (ref 70–110)
POCT GLUCOSE: 258 MG/DL (ref 70–110)
POCT GLUCOSE: 265 MG/DL (ref 70–110)
POCT GLUCOSE: 266 MG/DL (ref 70–110)
POCT GLUCOSE: 267 MG/DL (ref 70–110)
POCT GLUCOSE: 267 MG/DL (ref 70–110)
POCT GLUCOSE: 268 MG/DL (ref 70–110)
POCT GLUCOSE: 269 MG/DL (ref 70–110)
POCT GLUCOSE: 271 MG/DL (ref 70–110)
POCT GLUCOSE: 275 MG/DL (ref 70–110)
POCT GLUCOSE: 279 MG/DL (ref 70–110)
POCT GLUCOSE: 279 MG/DL (ref 70–110)
POCT GLUCOSE: 280 MG/DL (ref 70–110)
POCT GLUCOSE: 284 MG/DL (ref 70–110)
POCT GLUCOSE: 286 MG/DL (ref 70–110)
POCT GLUCOSE: 290 MG/DL (ref 70–110)
POCT GLUCOSE: 295 MG/DL (ref 70–110)
POCT GLUCOSE: 300 MG/DL (ref 70–110)
POCT GLUCOSE: 305 MG/DL (ref 70–110)
POCT GLUCOSE: 309 MG/DL (ref 70–110)
POCT GLUCOSE: 312 MG/DL (ref 70–110)
POCT GLUCOSE: 323 MG/DL (ref 70–110)
POCT GLUCOSE: 330 MG/DL (ref 70–110)
POCT GLUCOSE: 352 MG/DL (ref 70–110)
POCT GLUCOSE: 352 MG/DL (ref 70–110)
POCT GLUCOSE: 367 MG/DL (ref 70–110)
POCT GLUCOSE: 371 MG/DL (ref 70–110)
POCT GLUCOSE: 380 MG/DL (ref 70–110)
POCT GLUCOSE: 387 MG/DL (ref 70–110)
POCT GLUCOSE: 396 MG/DL (ref 70–110)
POCT GLUCOSE: 403 MG/DL (ref 70–110)
POCT GLUCOSE: 449 MG/DL (ref 70–110)
POCT GLUCOSE: 454 MG/DL (ref 70–110)
POCT GLUCOSE: 500 MG/DL (ref 70–110)
POCT GLUCOSE: 67 MG/DL (ref 70–110)
POCT GLUCOSE: 71 MG/DL (ref 70–110)
POCT GLUCOSE: 73 MG/DL (ref 70–110)
POCT GLUCOSE: 77 MG/DL (ref 70–110)
POCT GLUCOSE: 94 MG/DL (ref 70–110)
POCT GLUCOSE: 96 MG/DL (ref 70–110)
POCT GLUCOSE: >500 MG/DL (ref 70–110)
POIKILOCYTOSIS BLD QL SMEAR: SLIGHT
POTASSIUM BLD-SCNC: 3.5 MMOL/L (ref 3.5–5.1)
POTASSIUM BLD-SCNC: 3.7 MMOL/L (ref 3.5–5.1)
POTASSIUM BLD-SCNC: 6.7 MMOL/L (ref 3.5–5.1)
POTASSIUM SERPL-SCNC: 3.3 MMOL/L (ref 3.5–5.1)
POTASSIUM SERPL-SCNC: 3.4 MMOL/L (ref 3.5–5.1)
POTASSIUM SERPL-SCNC: 3.5 MMOL/L (ref 3.5–5.1)
POTASSIUM SERPL-SCNC: 3.6 MMOL/L (ref 3.5–5.1)
POTASSIUM SERPL-SCNC: 3.8 MMOL/L (ref 3.5–5.1)
POTASSIUM SERPL-SCNC: 3.9 MMOL/L (ref 3.5–5.1)
POTASSIUM SERPL-SCNC: 3.9 MMOL/L (ref 3.5–5.1)
POTASSIUM SERPL-SCNC: 4 MMOL/L (ref 3.5–5.1)
POTASSIUM SERPL-SCNC: 4 MMOL/L (ref 3.5–5.1)
POTASSIUM SERPL-SCNC: 4.1 MMOL/L (ref 3.5–5.1)
POTASSIUM SERPL-SCNC: 4.2 MMOL/L (ref 3.5–5.1)
POTASSIUM SERPL-SCNC: 4.2 MMOL/L (ref 3.5–5.1)
POTASSIUM SERPL-SCNC: 4.3 MMOL/L (ref 3.5–5.1)
POTASSIUM SERPL-SCNC: 4.4 MMOL/L (ref 3.5–5.1)
POTASSIUM SERPL-SCNC: 4.4 MMOL/L (ref 3.5–5.1)
POTASSIUM SERPL-SCNC: 4.5 MMOL/L (ref 3.5–5.1)
POTASSIUM SERPL-SCNC: 4.5 MMOL/L (ref 3.5–5.1)
POTASSIUM SERPL-SCNC: 4.6 MMOL/L (ref 3.5–5.1)
POTASSIUM SERPL-SCNC: 4.6 MMOL/L (ref 3.5–5.1)
POTASSIUM SERPL-SCNC: 4.8 MMOL/L (ref 3.5–5.1)
POTASSIUM SERPL-SCNC: 4.8 MMOL/L (ref 3.5–5.1)
POTASSIUM SERPL-SCNC: 4.9 MMOL/L (ref 3.5–5.1)
POTASSIUM SERPL-SCNC: 5 MMOL/L (ref 3.5–5.1)
POTASSIUM SERPL-SCNC: 5.1 MMOL/L (ref 3.5–5.1)
POTASSIUM SERPL-SCNC: 5.2 MMOL/L (ref 3.5–5.1)
POTASSIUM SERPL-SCNC: 5.3 MMOL/L (ref 3.5–5.1)
POTASSIUM SERPL-SCNC: 5.4 MMOL/L (ref 3.5–5.1)
POTASSIUM SERPL-SCNC: 6.2 MMOL/L (ref 3.5–5.1)
POTASSIUM SERPL-SCNC: 7 MMOL/L (ref 3.5–5.1)
POTASSIUM SERPL-SCNC: 7 MMOL/L (ref 3.5–5.1)
PROCALCITONIN SERPL IA-MCNC: 2.48 NG/ML
PROT SERPL-MCNC: 5.2 G/DL (ref 6–8.4)
PROT SERPL-MCNC: 5.9 G/DL (ref 6–8.4)
PROT SERPL-MCNC: 7.4 G/DL (ref 6–8.4)
PROT UR QL STRIP: ABNORMAL
PROT UR QL STRIP: ABNORMAL
PROTHROMBIN TIME: 20.1 SEC (ref 9–12.5)
PULM VEIN S/D RATIO: 1.58
PV PEAK D VEL: 0.26 M/S
PV PEAK S VEL: 0.41 M/S
RA PRESSURE: 3 MMHG
RBC # BLD AUTO: 3.54 M/UL (ref 4.6–6.2)
RBC # BLD AUTO: 3.97 M/UL (ref 4.6–6.2)
RBC # BLD AUTO: 4.06 M/UL (ref 4.6–6.2)
RBC # BLD AUTO: 4.3 M/UL (ref 4.6–6.2)
RBC # BLD AUTO: 4.32 M/UL (ref 4.6–6.2)
RBC # BLD AUTO: 4.59 M/UL (ref 4.6–6.2)
RBC # BLD AUTO: 4.63 M/UL (ref 4.6–6.2)
RBC # BLD AUTO: 4.65 M/UL (ref 4.6–6.2)
RBC # BLD AUTO: 4.68 M/UL (ref 4.6–6.2)
RBC # BLD AUTO: 4.85 M/UL (ref 4.6–6.2)
RBC # BLD AUTO: 5.06 M/UL (ref 4.6–6.2)
RBC # BLD AUTO: 5.3 M/UL (ref 4.6–6.2)
RBC #/AREA URNS HPF: 3 /HPF (ref 0–4)
RBC #/AREA URNS HPF: 3 /HPF (ref 0–4)
RIGHT VENTRICULAR END-DIASTOLIC DIMENSION: 2.53 CM
RSV AG BY MOLECULAR METHOD: NOT DETECTED
S PNEUM AG UR QL: NOT DETECTED
SAMPLE: ABNORMAL
SARS-COV-2 RDRP RESP QL NAA+PROBE: NEGATIVE
SARS-COV-2 RNA RESP QL NAA+PROBE: NOT DETECTED
SITE: ABNORMAL
SODIUM BLD-SCNC: 127 MMOL/L (ref 136–145)
SODIUM BLD-SCNC: 129 MMOL/L (ref 136–145)
SODIUM BLD-SCNC: 131 MMOL/L (ref 136–145)
SODIUM SERPL-SCNC: 127 MMOL/L (ref 136–145)
SODIUM SERPL-SCNC: 128 MMOL/L (ref 136–145)
SODIUM SERPL-SCNC: 128 MMOL/L (ref 136–145)
SODIUM SERPL-SCNC: 129 MMOL/L (ref 136–145)
SODIUM SERPL-SCNC: 130 MMOL/L (ref 136–145)
SODIUM SERPL-SCNC: 131 MMOL/L (ref 136–145)
SODIUM SERPL-SCNC: 132 MMOL/L (ref 136–145)
SODIUM SERPL-SCNC: 133 MMOL/L (ref 136–145)
SODIUM SERPL-SCNC: 134 MMOL/L (ref 136–145)
SODIUM SERPL-SCNC: 134 MMOL/L (ref 136–145)
SODIUM SERPL-SCNC: 135 MMOL/L (ref 136–145)
SODIUM SERPL-SCNC: 135 MMOL/L (ref 136–145)
SODIUM SERPL-SCNC: 137 MMOL/L (ref 136–145)
SODIUM UR-SCNC: <20 MMOL/L (ref 20–250)
SP GR UR STRIP: 1.01 (ref 1–1.03)
SP GR UR STRIP: >1.03 (ref 1–1.03)
SP02: 93
SP02: 93
SP02: 96
SPONT RATE: 20
STAPH AUREUS ID BY PCR: POSITIVE
STAPH AUREUS ID BY PCR: POSITIVE
T4 FREE SERPL-MCNC: 1.04 NG/DL (ref 0.71–1.51)
TARGETS BLD QL SMEAR: ABNORMAL
TDI LATERAL: 0.06 M/S
TDI SEPTAL: 0.06 M/S
TDI: 0.06 M/S
TR MAX PG: 26 MMHG
TRICUSPID ANNULAR PLANE SYSTOLIC EXCURSION: 2.05 CM
TRIGL SERPL-MCNC: 218 MG/DL (ref 30–150)
TROPONIN I SERPL DL<=0.01 NG/ML-MCNC: 0.01 NG/ML (ref 0–0.03)
TROPONIN I SERPL DL<=0.01 NG/ML-MCNC: 0.25 NG/ML (ref 0–0.03)
TROPONIN I SERPL DL<=0.01 NG/ML-MCNC: <0.006 NG/ML (ref 0–0.03)
TSH SERPL DL<=0.005 MIU/L-ACNC: 0.51 UIU/ML (ref 0.4–4)
TSH SERPL DL<=0.005 MIU/L-ACNC: 1.47 UIU/ML (ref 0.4–4)
TV REST PULMONARY ARTERY PRESSURE: 29 MMHG
URN SPEC COLLECT METH UR: ABNORMAL
URN SPEC COLLECT METH UR: ABNORMAL
UROBILINOGEN UR STRIP-ACNC: NEGATIVE EU/DL
UROBILINOGEN UR STRIP-ACNC: NEGATIVE EU/DL
VANCOMYCIN SERPL-MCNC: 12.8 UG/ML
VIT B12 SERPL-MCNC: 1659 PG/ML (ref 210–950)
VT: 450
VT: 450
VT: 475
VT: 480
VT: 500
WBC # BLD AUTO: 10.38 K/UL (ref 3.9–12.7)
WBC # BLD AUTO: 12.19 K/UL (ref 3.9–12.7)
WBC # BLD AUTO: 12.44 K/UL (ref 3.9–12.7)
WBC # BLD AUTO: 14.43 K/UL (ref 3.9–12.7)
WBC # BLD AUTO: 14.58 K/UL (ref 3.9–12.7)
WBC # BLD AUTO: 15.07 K/UL (ref 3.9–12.7)
WBC # BLD AUTO: 15.43 K/UL (ref 3.9–12.7)
WBC # BLD AUTO: 22.38 K/UL (ref 3.9–12.7)
WBC # BLD AUTO: 23.26 K/UL (ref 3.9–12.7)
WBC # BLD AUTO: 29.4 K/UL (ref 3.9–12.7)
WBC # BLD AUTO: 30.79 K/UL (ref 3.9–12.7)
WBC # BLD AUTO: 31.53 K/UL (ref 3.9–12.7)
WBC # FLD: NORMAL /CU MM
WBC #/AREA URNS HPF: 3 /HPF (ref 0–5)
WBC #/AREA URNS HPF: 5 /HPF (ref 0–5)
YEAST URNS QL MICRO: NORMAL
YEAST URNS QL MICRO: NORMAL

## 2022-01-01 PROCEDURE — 31500 INSERT EMERGENCY AIRWAY: CPT

## 2022-01-01 PROCEDURE — 63600175 PHARM REV CODE 636 W HCPCS: Performed by: INTERNAL MEDICINE

## 2022-01-01 PROCEDURE — 25000003 PHARM REV CODE 250: Performed by: INTERNAL MEDICINE

## 2022-01-01 PROCEDURE — 99900035 HC TECH TIME PER 15 MIN (STAT)

## 2022-01-01 PROCEDURE — 87075 CULTR BACTERIA EXCEPT BLOOD: CPT | Performed by: STUDENT IN AN ORGANIZED HEALTH CARE EDUCATION/TRAINING PROGRAM

## 2022-01-01 PROCEDURE — 87070 CULTURE OTHR SPECIMN AEROBIC: CPT | Performed by: INTERNAL MEDICINE

## 2022-01-01 PROCEDURE — 93010 ELECTROCARDIOGRAM REPORT: CPT | Mod: ,,, | Performed by: INTERNAL MEDICINE

## 2022-01-01 PROCEDURE — 99900026 HC AIRWAY MAINTENANCE (STAT)

## 2022-01-01 PROCEDURE — 25000242 PHARM REV CODE 250 ALT 637 W/ HCPCS: Performed by: INTERNAL MEDICINE

## 2022-01-01 PROCEDURE — 25000242 PHARM REV CODE 250 ALT 637 W/ HCPCS

## 2022-01-01 PROCEDURE — 83735 ASSAY OF MAGNESIUM: CPT | Mod: 91 | Performed by: STUDENT IN AN ORGANIZED HEALTH CARE EDUCATION/TRAINING PROGRAM

## 2022-01-01 PROCEDURE — 87186 SC STD MICRODIL/AGAR DIL: CPT | Mod: 59 | Performed by: STUDENT IN AN ORGANIZED HEALTH CARE EDUCATION/TRAINING PROGRAM

## 2022-01-01 PROCEDURE — 63600175 PHARM REV CODE 636 W HCPCS: Performed by: STUDENT IN AN ORGANIZED HEALTH CARE EDUCATION/TRAINING PROGRAM

## 2022-01-01 PROCEDURE — 94761 N-INVAS EAR/PLS OXIMETRY MLT: CPT

## 2022-01-01 PROCEDURE — 99223 1ST HOSP IP/OBS HIGH 75: CPT | Mod: ,,, | Performed by: ORTHOPAEDIC SURGERY

## 2022-01-01 PROCEDURE — 20000000 HC ICU ROOM

## 2022-01-01 PROCEDURE — 94640 AIRWAY INHALATION TREATMENT: CPT

## 2022-01-01 PROCEDURE — 85014 HEMATOCRIT: CPT

## 2022-01-01 PROCEDURE — 85025 COMPLETE CBC W/AUTO DIFF WBC: CPT

## 2022-01-01 PROCEDURE — 84100 ASSAY OF PHOSPHORUS: CPT | Performed by: STUDENT IN AN ORGANIZED HEALTH CARE EDUCATION/TRAINING PROGRAM

## 2022-01-01 PROCEDURE — 80048 BASIC METABOLIC PNL TOTAL CA: CPT | Mod: 91 | Performed by: HOSPITALIST

## 2022-01-01 PROCEDURE — 82803 BLOOD GASES ANY COMBINATION: CPT

## 2022-01-01 PROCEDURE — 63600175 PHARM REV CODE 636 W HCPCS

## 2022-01-01 PROCEDURE — 84484 ASSAY OF TROPONIN QUANT: CPT | Performed by: STUDENT IN AN ORGANIZED HEALTH CARE EDUCATION/TRAINING PROGRAM

## 2022-01-01 PROCEDURE — 85027 COMPLETE CBC AUTOMATED: CPT

## 2022-01-01 PROCEDURE — 0241U SARS-COV2 (COVID) WITH FLU/RSV BY PCR: CPT | Performed by: STUDENT IN AN ORGANIZED HEALTH CARE EDUCATION/TRAINING PROGRAM

## 2022-01-01 PROCEDURE — C9113 INJ PANTOPRAZOLE SODIUM, VIA: HCPCS | Performed by: STUDENT IN AN ORGANIZED HEALTH CARE EDUCATION/TRAINING PROGRAM

## 2022-01-01 PROCEDURE — 84100 ASSAY OF PHOSPHORUS: CPT | Performed by: HOSPITALIST

## 2022-01-01 PROCEDURE — 27000221 HC OXYGEN, UP TO 24 HOURS

## 2022-01-01 PROCEDURE — 80053 COMPREHEN METABOLIC PANEL: CPT | Performed by: EMERGENCY MEDICINE

## 2022-01-01 PROCEDURE — 80048 BASIC METABOLIC PNL TOTAL CA: CPT | Performed by: INTERNAL MEDICINE

## 2022-01-01 PROCEDURE — 27200966 HC CLOSED SUCTION SYSTEM

## 2022-01-01 PROCEDURE — 36600 WITHDRAWAL OF ARTERIAL BLOOD: CPT

## 2022-01-01 PROCEDURE — 36415 COLL VENOUS BLD VENIPUNCTURE: CPT | Performed by: HOSPITALIST

## 2022-01-01 PROCEDURE — 85610 PROTHROMBIN TIME: CPT | Performed by: STUDENT IN AN ORGANIZED HEALTH CARE EDUCATION/TRAINING PROGRAM

## 2022-01-01 PROCEDURE — 87186 SC STD MICRODIL/AGAR DIL: CPT | Performed by: HOSPITALIST

## 2022-01-01 PROCEDURE — 83735 ASSAY OF MAGNESIUM: CPT | Performed by: INTERNAL MEDICINE

## 2022-01-01 PROCEDURE — 36415 COLL VENOUS BLD VENIPUNCTURE: CPT | Performed by: STUDENT IN AN ORGANIZED HEALTH CARE EDUCATION/TRAINING PROGRAM

## 2022-01-01 PROCEDURE — 83605 ASSAY OF LACTIC ACID: CPT | Performed by: HOSPITALIST

## 2022-01-01 PROCEDURE — 93010 EKG 12-LEAD: ICD-10-PCS | Mod: ,,, | Performed by: INTERNAL MEDICINE

## 2022-01-01 PROCEDURE — 94660 CPAP INITIATION&MGMT: CPT

## 2022-01-01 PROCEDURE — 94002 VENT MGMT INPAT INIT DAY: CPT

## 2022-01-01 PROCEDURE — 83930 ASSAY OF BLOOD OSMOLALITY: CPT | Performed by: STUDENT IN AN ORGANIZED HEALTH CARE EDUCATION/TRAINING PROGRAM

## 2022-01-01 PROCEDURE — 25000003 PHARM REV CODE 250: Performed by: STUDENT IN AN ORGANIZED HEALTH CARE EDUCATION/TRAINING PROGRAM

## 2022-01-01 PROCEDURE — 94003 VENT MGMT INPAT SUBQ DAY: CPT

## 2022-01-01 PROCEDURE — 81000 URINALYSIS NONAUTO W/SCOPE: CPT | Performed by: NURSE PRACTITIONER

## 2022-01-01 PROCEDURE — 82607 VITAMIN B-12: CPT | Performed by: STUDENT IN AN ORGANIZED HEALTH CARE EDUCATION/TRAINING PROGRAM

## 2022-01-01 PROCEDURE — 63600175 PHARM REV CODE 636 W HCPCS: Performed by: HOSPITALIST

## 2022-01-01 PROCEDURE — 25000003 PHARM REV CODE 250: Performed by: HOSPITALIST

## 2022-01-01 PROCEDURE — 84484 ASSAY OF TROPONIN QUANT: CPT | Mod: 91 | Performed by: INTERNAL MEDICINE

## 2022-01-01 PROCEDURE — 63700000 PHARM REV CODE 250 ALT 637 W/O HCPCS: Performed by: STUDENT IN AN ORGANIZED HEALTH CARE EDUCATION/TRAINING PROGRAM

## 2022-01-01 PROCEDURE — 83735 ASSAY OF MAGNESIUM: CPT | Performed by: HOSPITALIST

## 2022-01-01 PROCEDURE — 87077 CULTURE AEROBIC IDENTIFY: CPT | Mod: 59 | Performed by: HOSPITALIST

## 2022-01-01 PROCEDURE — 87186 SC STD MICRODIL/AGAR DIL: CPT | Performed by: INTERNAL MEDICINE

## 2022-01-01 PROCEDURE — 87186 SC STD MICRODIL/AGAR DIL: CPT | Performed by: STUDENT IN AN ORGANIZED HEALTH CARE EDUCATION/TRAINING PROGRAM

## 2022-01-01 PROCEDURE — 90945 DIALYSIS ONE EVALUATION: CPT

## 2022-01-01 PROCEDURE — 87150 DNA/RNA AMPLIFIED PROBE: CPT | Performed by: INTERNAL MEDICINE

## 2022-01-01 PROCEDURE — 85027 COMPLETE CBC AUTOMATED: CPT | Performed by: HOSPITALIST

## 2022-01-01 PROCEDURE — 87070 CULTURE OTHR SPECIMN AEROBIC: CPT | Performed by: STUDENT IN AN ORGANIZED HEALTH CARE EDUCATION/TRAINING PROGRAM

## 2022-01-01 PROCEDURE — 63600175 PHARM REV CODE 636 W HCPCS: Performed by: EMERGENCY MEDICINE

## 2022-01-01 PROCEDURE — 33210 INSERT ELECTRD/PM CATH SNGL: CPT | Performed by: INTERNAL MEDICINE

## 2022-01-01 PROCEDURE — 87077 CULTURE AEROBIC IDENTIFY: CPT | Performed by: STUDENT IN AN ORGANIZED HEALTH CARE EDUCATION/TRAINING PROGRAM

## 2022-01-01 PROCEDURE — 63600175 PHARM REV CODE 636 W HCPCS: Mod: JG | Performed by: STUDENT IN AN ORGANIZED HEALTH CARE EDUCATION/TRAINING PROGRAM

## 2022-01-01 PROCEDURE — 87070 CULTURE OTHR SPECIMN AEROBIC: CPT | Mod: 59 | Performed by: STUDENT IN AN ORGANIZED HEALTH CARE EDUCATION/TRAINING PROGRAM

## 2022-01-01 PROCEDURE — 85027 COMPLETE CBC AUTOMATED: CPT | Performed by: INTERNAL MEDICINE

## 2022-01-01 PROCEDURE — 27201423 OPTIME MED/SURG SUP & DEVICES STERILE SUPPLY: Performed by: INTERNAL MEDICINE

## 2022-01-01 PROCEDURE — 80048 BASIC METABOLIC PNL TOTAL CA: CPT | Mod: 91 | Performed by: INTERNAL MEDICINE

## 2022-01-01 PROCEDURE — 85025 COMPLETE CBC W/AUTO DIFF WBC: CPT | Performed by: HOSPITALIST

## 2022-01-01 PROCEDURE — 36620 INSERTION CATHETER ARTERY: CPT | Mod: ,,, | Performed by: INTERNAL MEDICINE

## 2022-01-01 PROCEDURE — 85007 BL SMEAR W/DIFF WBC COUNT: CPT | Mod: 91 | Performed by: STUDENT IN AN ORGANIZED HEALTH CARE EDUCATION/TRAINING PROGRAM

## 2022-01-01 PROCEDURE — 80048 BASIC METABOLIC PNL TOTAL CA: CPT | Performed by: HOSPITALIST

## 2022-01-01 PROCEDURE — 27000923 HC TRIALYSIS CATHETER, ANY SIZE

## 2022-01-01 PROCEDURE — 80048 BASIC METABOLIC PNL TOTAL CA: CPT | Mod: 91,XB | Performed by: HOSPITALIST

## 2022-01-01 PROCEDURE — 25000003 PHARM REV CODE 250

## 2022-01-01 PROCEDURE — 87077 CULTURE AEROBIC IDENTIFY: CPT | Mod: 59 | Performed by: INTERNAL MEDICINE

## 2022-01-01 PROCEDURE — 84484 ASSAY OF TROPONIN QUANT: CPT | Performed by: EMERGENCY MEDICINE

## 2022-01-01 PROCEDURE — 80100008 HC CRRT DAILY MAINTENANCE

## 2022-01-01 PROCEDURE — 81000 URINALYSIS NONAUTO W/SCOPE: CPT | Performed by: INTERNAL MEDICINE

## 2022-01-01 PROCEDURE — 82010 KETONE BODYS QUAN: CPT | Performed by: EMERGENCY MEDICINE

## 2022-01-01 PROCEDURE — 83605 ASSAY OF LACTIC ACID: CPT | Mod: 91 | Performed by: STUDENT IN AN ORGANIZED HEALTH CARE EDUCATION/TRAINING PROGRAM

## 2022-01-01 PROCEDURE — 83935 ASSAY OF URINE OSMOLALITY: CPT | Performed by: STUDENT IN AN ORGANIZED HEALTH CARE EDUCATION/TRAINING PROGRAM

## 2022-01-01 PROCEDURE — 93005 ELECTROCARDIOGRAM TRACING: CPT

## 2022-01-01 PROCEDURE — 33210 INSERT ELECTRD/PM CATH SNGL: CPT | Mod: ,,, | Performed by: INTERNAL MEDICINE

## 2022-01-01 PROCEDURE — 99285 EMERGENCY DEPT VISIT HI MDM: CPT | Mod: 25

## 2022-01-01 PROCEDURE — 80100014 HC HEMODIALYSIS 1:1

## 2022-01-01 PROCEDURE — 85025 COMPLETE CBC W/AUTO DIFF WBC: CPT | Performed by: INTERNAL MEDICINE

## 2022-01-01 PROCEDURE — 87186 SC STD MICRODIL/AGAR DIL: CPT | Mod: 59 | Performed by: HOSPITALIST

## 2022-01-01 PROCEDURE — 36620 PR INSERT CATH,ART,PERCUT,SHORTTERM: ICD-10-PCS | Mod: ,,, | Performed by: INTERNAL MEDICINE

## 2022-01-01 PROCEDURE — 27000190 HC CPAP FULL FACE MASK W/VALVE

## 2022-01-01 PROCEDURE — 87040 BLOOD CULTURE FOR BACTERIA: CPT | Performed by: HOSPITALIST

## 2022-01-01 PROCEDURE — 83880 ASSAY OF NATRIURETIC PEPTIDE: CPT | Performed by: EMERGENCY MEDICINE

## 2022-01-01 PROCEDURE — 84439 ASSAY OF FREE THYROXINE: CPT | Performed by: INTERNAL MEDICINE

## 2022-01-01 PROCEDURE — 87205 SMEAR GRAM STAIN: CPT | Performed by: STUDENT IN AN ORGANIZED HEALTH CARE EDUCATION/TRAINING PROGRAM

## 2022-01-01 PROCEDURE — 80069 RENAL FUNCTION PANEL: CPT | Performed by: STUDENT IN AN ORGANIZED HEALTH CARE EDUCATION/TRAINING PROGRAM

## 2022-01-01 PROCEDURE — 80074 ACUTE HEPATITIS PANEL: CPT | Performed by: HOSPITALIST

## 2022-01-01 PROCEDURE — 85730 THROMBOPLASTIN TIME PARTIAL: CPT | Performed by: STUDENT IN AN ORGANIZED HEALTH CARE EDUCATION/TRAINING PROGRAM

## 2022-01-01 PROCEDURE — 87899 AGENT NOS ASSAY W/OPTIC: CPT | Performed by: INTERNAL MEDICINE

## 2022-01-01 PROCEDURE — 87147 CULTURE TYPE IMMUNOLOGIC: CPT | Performed by: INTERNAL MEDICINE

## 2022-01-01 PROCEDURE — 87449 NOS EACH ORGANISM AG IA: CPT | Performed by: INTERNAL MEDICINE

## 2022-01-01 PROCEDURE — 85007 BL SMEAR W/DIFF WBC COUNT: CPT

## 2022-01-01 PROCEDURE — 87077 CULTURE AEROBIC IDENTIFY: CPT | Mod: 59 | Performed by: STUDENT IN AN ORGANIZED HEALTH CARE EDUCATION/TRAINING PROGRAM

## 2022-01-01 PROCEDURE — 86160 COMPLEMENT ANTIGEN: CPT | Mod: 59 | Performed by: INTERNAL MEDICINE

## 2022-01-01 PROCEDURE — 87088 URINE BACTERIA CULTURE: CPT | Performed by: STUDENT IN AN ORGANIZED HEALTH CARE EDUCATION/TRAINING PROGRAM

## 2022-01-01 PROCEDURE — 87205 SMEAR GRAM STAIN: CPT | Mod: 59 | Performed by: STUDENT IN AN ORGANIZED HEALTH CARE EDUCATION/TRAINING PROGRAM

## 2022-01-01 PROCEDURE — 84132 ASSAY OF SERUM POTASSIUM: CPT | Performed by: STUDENT IN AN ORGANIZED HEALTH CARE EDUCATION/TRAINING PROGRAM

## 2022-01-01 PROCEDURE — 80202 ASSAY OF VANCOMYCIN: CPT | Performed by: HOSPITALIST

## 2022-01-01 PROCEDURE — 27100171 HC OXYGEN HIGH FLOW UP TO 24 HOURS

## 2022-01-01 PROCEDURE — 87205 SMEAR GRAM STAIN: CPT | Performed by: INTERNAL MEDICINE

## 2022-01-01 PROCEDURE — 87040 BLOOD CULTURE FOR BACTERIA: CPT | Mod: 59 | Performed by: HOSPITALIST

## 2022-01-01 PROCEDURE — 99223 PR INITIAL HOSPITAL CARE,LEVL III: ICD-10-PCS | Mod: ,,, | Performed by: ORTHOPAEDIC SURGERY

## 2022-01-01 PROCEDURE — 84145 PROCALCITONIN (PCT): CPT | Performed by: INTERNAL MEDICINE

## 2022-01-01 PROCEDURE — 99233 SBSQ HOSP IP/OBS HIGH 50: CPT | Mod: ,,, | Performed by: INTERNAL MEDICINE

## 2022-01-01 PROCEDURE — 87077 CULTURE AEROBIC IDENTIFY: CPT | Performed by: HOSPITALIST

## 2022-01-01 PROCEDURE — 84443 ASSAY THYROID STIM HORMONE: CPT | Performed by: INTERNAL MEDICINE

## 2022-01-01 PROCEDURE — 36415 COLL VENOUS BLD VENIPUNCTURE: CPT | Performed by: INTERNAL MEDICINE

## 2022-01-01 PROCEDURE — 84132 ASSAY OF SERUM POTASSIUM: CPT | Performed by: HOSPITALIST

## 2022-01-01 PROCEDURE — 99223 PR INITIAL HOSPITAL CARE,LEVL III: ICD-10-PCS | Mod: ,,, | Performed by: INTERNAL MEDICINE

## 2022-01-01 PROCEDURE — C1894 INTRO/SHEATH, NON-LASER: HCPCS | Performed by: INTERNAL MEDICINE

## 2022-01-01 PROCEDURE — 80048 BASIC METABOLIC PNL TOTAL CA: CPT | Mod: 91,XB | Performed by: INTERNAL MEDICINE

## 2022-01-01 PROCEDURE — 80069 RENAL FUNCTION PANEL: CPT | Mod: 91 | Performed by: STUDENT IN AN ORGANIZED HEALTH CARE EDUCATION/TRAINING PROGRAM

## 2022-01-01 PROCEDURE — 99223 1ST HOSP IP/OBS HIGH 75: CPT | Mod: ,,, | Performed by: INTERNAL MEDICINE

## 2022-01-01 PROCEDURE — 86140 C-REACTIVE PROTEIN: CPT | Performed by: STUDENT IN AN ORGANIZED HEALTH CARE EDUCATION/TRAINING PROGRAM

## 2022-01-01 PROCEDURE — 82746 ASSAY OF FOLIC ACID SERUM: CPT | Performed by: STUDENT IN AN ORGANIZED HEALTH CARE EDUCATION/TRAINING PROGRAM

## 2022-01-01 PROCEDURE — 51702 INSERT TEMP BLADDER CATH: CPT

## 2022-01-01 PROCEDURE — 36620 INSERTION CATHETER ARTERY: CPT | Performed by: INTERNAL MEDICINE

## 2022-01-01 PROCEDURE — 84295 ASSAY OF SERUM SODIUM: CPT

## 2022-01-01 PROCEDURE — 80061 LIPID PANEL: CPT | Performed by: INTERNAL MEDICINE

## 2022-01-01 PROCEDURE — 87086 URINE CULTURE/COLONY COUNT: CPT | Performed by: STUDENT IN AN ORGANIZED HEALTH CARE EDUCATION/TRAINING PROGRAM

## 2022-01-01 PROCEDURE — 84132 ASSAY OF SERUM POTASSIUM: CPT

## 2022-01-01 PROCEDURE — 87106 FUNGI IDENTIFICATION YEAST: CPT | Performed by: INTERNAL MEDICINE

## 2022-01-01 PROCEDURE — 82947 ASSAY GLUCOSE BLOOD QUANT: CPT | Performed by: HOSPITALIST

## 2022-01-01 PROCEDURE — 33210 PR INSER HEART TEMP PACER ONE CHMBR: ICD-10-PCS | Mod: ,,, | Performed by: INTERNAL MEDICINE

## 2022-01-01 PROCEDURE — 87635 SARS-COV-2 COVID-19 AMP PRB: CPT | Performed by: EMERGENCY MEDICINE

## 2022-01-01 PROCEDURE — 83036 HEMOGLOBIN GLYCOSYLATED A1C: CPT | Performed by: INTERNAL MEDICINE

## 2022-01-01 PROCEDURE — 83880 ASSAY OF NATRIURETIC PEPTIDE: CPT | Performed by: STUDENT IN AN ORGANIZED HEALTH CARE EDUCATION/TRAINING PROGRAM

## 2022-01-01 PROCEDURE — 87040 BLOOD CULTURE FOR BACTERIA: CPT | Mod: 59 | Performed by: INTERNAL MEDICINE

## 2022-01-01 PROCEDURE — 86160 COMPLEMENT ANTIGEN: CPT | Performed by: INTERNAL MEDICINE

## 2022-01-01 PROCEDURE — 89051 BODY FLUID CELL COUNT: CPT | Performed by: STUDENT IN AN ORGANIZED HEALTH CARE EDUCATION/TRAINING PROGRAM

## 2022-01-01 PROCEDURE — 99233 PR SUBSEQUENT HOSPITAL CARE,LEVL III: ICD-10-PCS | Mod: ,,, | Performed by: INTERNAL MEDICINE

## 2022-01-01 PROCEDURE — 83735 ASSAY OF MAGNESIUM: CPT | Performed by: STUDENT IN AN ORGANIZED HEALTH CARE EDUCATION/TRAINING PROGRAM

## 2022-01-01 PROCEDURE — 85007 BL SMEAR W/DIFF WBC COUNT: CPT | Performed by: INTERNAL MEDICINE

## 2022-01-01 PROCEDURE — 85652 RBC SED RATE AUTOMATED: CPT | Performed by: STUDENT IN AN ORGANIZED HEALTH CARE EDUCATION/TRAINING PROGRAM

## 2022-01-01 PROCEDURE — 84443 ASSAY THYROID STIM HORMONE: CPT | Performed by: STUDENT IN AN ORGANIZED HEALTH CARE EDUCATION/TRAINING PROGRAM

## 2022-01-01 PROCEDURE — 84300 ASSAY OF URINE SODIUM: CPT | Performed by: STUDENT IN AN ORGANIZED HEALTH CARE EDUCATION/TRAINING PROGRAM

## 2022-01-01 PROCEDURE — 80053 COMPREHEN METABOLIC PANEL: CPT | Performed by: STUDENT IN AN ORGANIZED HEALTH CARE EDUCATION/TRAINING PROGRAM

## 2022-01-01 PROCEDURE — 87502 INFLUENZA DNA AMP PROBE: CPT

## 2022-01-01 PROCEDURE — 83605 ASSAY OF LACTIC ACID: CPT

## 2022-01-01 PROCEDURE — 85027 COMPLETE CBC AUTOMATED: CPT | Mod: 91 | Performed by: STUDENT IN AN ORGANIZED HEALTH CARE EDUCATION/TRAINING PROGRAM

## 2022-01-01 PROCEDURE — 85007 BL SMEAR W/DIFF WBC COUNT: CPT | Performed by: HOSPITALIST

## 2022-01-01 PROCEDURE — 96374 THER/PROPH/DIAG INJ IV PUSH: CPT

## 2022-01-01 RX ORDER — MAG HYDROX/ALUMINUM HYD/SIMETH 200-200-20
30 SUSPENSION, ORAL (FINAL DOSE FORM) ORAL 4 TIMES DAILY PRN
Status: DISCONTINUED | OUTPATIENT
Start: 2022-01-01 | End: 2022-01-01

## 2022-01-01 RX ORDER — SODIUM CHLORIDE 9 MG/ML
INJECTION, SOLUTION INTRAVENOUS CONTINUOUS
Status: CANCELLED | OUTPATIENT
Start: 2022-01-01 | End: 2022-01-01

## 2022-01-01 RX ORDER — MUPIROCIN 20 MG/G
OINTMENT TOPICAL 2 TIMES DAILY
Status: COMPLETED | OUTPATIENT
Start: 2022-01-01 | End: 2022-01-01

## 2022-01-01 RX ORDER — SODIUM CHLORIDE 9 MG/ML
INJECTION, SOLUTION INTRAVENOUS CONTINUOUS
Status: DISCONTINUED | OUTPATIENT
Start: 2022-01-01 | End: 2022-01-01

## 2022-01-01 RX ORDER — SCOLOPAMINE TRANSDERMAL SYSTEM 1 MG/1
1 PATCH, EXTENDED RELEASE TRANSDERMAL
Status: DISCONTINUED | OUTPATIENT
Start: 2022-01-01 | End: 2022-01-01 | Stop reason: HOSPADM

## 2022-01-01 RX ORDER — INSULIN ASPART 100 [IU]/ML
1-10 INJECTION, SOLUTION INTRAVENOUS; SUBCUTANEOUS
Status: DISCONTINUED | OUTPATIENT
Start: 2022-01-01 | End: 2022-01-01

## 2022-01-01 RX ORDER — AMOXICILLIN 250 MG
1 CAPSULE ORAL 2 TIMES DAILY
Status: DISCONTINUED | OUTPATIENT
Start: 2022-01-01 | End: 2022-01-01 | Stop reason: HOSPADM

## 2022-01-01 RX ORDER — LANOLIN ALCOHOL/MO/W.PET/CERES
800 CREAM (GRAM) TOPICAL
Status: DISCONTINUED | OUTPATIENT
Start: 2022-01-01 | End: 2022-01-01

## 2022-01-01 RX ORDER — INSULIN ASPART 100 [IU]/ML
4 INJECTION, SOLUTION INTRAVENOUS; SUBCUTANEOUS
Status: DISCONTINUED | OUTPATIENT
Start: 2022-01-01 | End: 2022-01-01

## 2022-01-01 RX ORDER — NOREPINEPHRINE BITARTRATE/D5W 4MG/250ML
0-3 PLASTIC BAG, INJECTION (ML) INTRAVENOUS CONTINUOUS
Status: DISCONTINUED | OUTPATIENT
Start: 2022-01-01 | End: 2022-01-01

## 2022-01-01 RX ORDER — PROPOFOL 10 MG/ML
0-50 INJECTION, EMULSION INTRAVENOUS CONTINUOUS
Status: DISCONTINUED | OUTPATIENT
Start: 2022-01-01 | End: 2022-01-01

## 2022-01-01 RX ORDER — SIMETHICONE 80 MG
1 TABLET,CHEWABLE ORAL 4 TIMES DAILY PRN
Status: DISCONTINUED | OUTPATIENT
Start: 2022-01-01 | End: 2022-01-01

## 2022-01-01 RX ORDER — AMIODARONE HYDROCHLORIDE 150 MG/3ML
INJECTION, SOLUTION INTRAVENOUS
Status: COMPLETED
Start: 2022-01-01 | End: 2022-01-01

## 2022-01-01 RX ORDER — SODIUM CHLORIDE 0.9 % (FLUSH) 0.9 %
10 SYRINGE (ML) INJECTION EVERY 12 HOURS PRN
Status: DISCONTINUED | OUTPATIENT
Start: 2022-01-01 | End: 2022-01-01

## 2022-01-01 RX ORDER — AMIODARONE HYDROCHLORIDE 200 MG/1
200 TABLET ORAL 2 TIMES DAILY
Status: DISCONTINUED | OUTPATIENT
Start: 2022-01-01 | End: 2022-01-01

## 2022-01-01 RX ORDER — ETOMIDATE 2 MG/ML
0.3 INJECTION INTRAVENOUS ONCE
Status: COMPLETED | OUTPATIENT
Start: 2022-01-01 | End: 2022-01-01

## 2022-01-01 RX ORDER — FENTANYL CITRATE 50 UG/ML
100 INJECTION, SOLUTION INTRAMUSCULAR; INTRAVENOUS
Status: DISCONTINUED | OUTPATIENT
Start: 2022-01-01 | End: 2022-01-01

## 2022-01-01 RX ORDER — PANTOPRAZOLE SODIUM 40 MG/10ML
40 INJECTION, POWDER, LYOPHILIZED, FOR SOLUTION INTRAVENOUS DAILY
Status: DISCONTINUED | OUTPATIENT
Start: 2022-01-01 | End: 2022-01-01

## 2022-01-01 RX ORDER — CEFAZOLIN SODIUM 2 G/50ML
2 SOLUTION INTRAVENOUS
Status: DISCONTINUED | OUTPATIENT
Start: 2022-01-01 | End: 2022-01-01

## 2022-01-01 RX ORDER — IPRATROPIUM BROMIDE AND ALBUTEROL SULFATE 2.5; .5 MG/3ML; MG/3ML
3 SOLUTION RESPIRATORY (INHALATION) EVERY 6 HOURS PRN
Status: DISCONTINUED | OUTPATIENT
Start: 2022-01-01 | End: 2022-01-01

## 2022-01-01 RX ORDER — LIDOCAINE HYDROCHLORIDE 10 MG/ML
INJECTION, SOLUTION EPIDURAL; INFILTRATION; INTRACAUDAL; PERINEURAL
Status: DISCONTINUED | OUTPATIENT
Start: 2022-01-01 | End: 2022-01-01 | Stop reason: HOSPADM

## 2022-01-01 RX ORDER — POLYETHYLENE GLYCOL 3350 17 G/17G
17 POWDER, FOR SOLUTION ORAL DAILY
Status: DISCONTINUED | OUTPATIENT
Start: 2022-01-01 | End: 2022-01-01

## 2022-01-01 RX ORDER — INDOMETHACIN 25 MG/1
CAPSULE ORAL
Status: COMPLETED
Start: 2022-01-01 | End: 2022-01-01

## 2022-01-01 RX ORDER — ASPIRIN 81 MG/1
81 TABLET ORAL DAILY
Status: DISCONTINUED | OUTPATIENT
Start: 2022-01-01 | End: 2022-01-01

## 2022-01-01 RX ORDER — NOREPINEPHRINE BITARTRATE 1 MG/ML
INJECTION, SOLUTION INTRAVENOUS
Status: DISCONTINUED | OUTPATIENT
Start: 2022-01-01 | End: 2022-01-01

## 2022-01-01 RX ORDER — DOPAMINE HYDROCHLORIDE 160 MG/100ML
0-20 INJECTION, SOLUTION INTRAVENOUS CONTINUOUS
Status: DISCONTINUED | OUTPATIENT
Start: 2022-01-01 | End: 2022-01-01

## 2022-01-01 RX ORDER — NALOXONE HCL 0.4 MG/ML
0.02 VIAL (ML) INJECTION
Status: DISCONTINUED | OUTPATIENT
Start: 2022-01-01 | End: 2022-01-01

## 2022-01-01 RX ORDER — MIDAZOLAM HYDROCHLORIDE 1 MG/ML
2 INJECTION INTRAMUSCULAR; INTRAVENOUS ONCE
Status: COMPLETED | OUTPATIENT
Start: 2022-01-01 | End: 2022-01-01

## 2022-01-01 RX ORDER — GLUCAGON 1 MG
1 KIT INJECTION
Status: DISCONTINUED | OUTPATIENT
Start: 2022-01-01 | End: 2022-01-01

## 2022-01-01 RX ORDER — ENOXAPARIN SODIUM 100 MG/ML
1 INJECTION SUBCUTANEOUS
Status: DISCONTINUED | OUTPATIENT
Start: 2022-01-01 | End: 2022-01-01

## 2022-01-01 RX ORDER — NAPROXEN SODIUM 220 MG/1
81 TABLET, FILM COATED ORAL DAILY
Status: DISCONTINUED | OUTPATIENT
Start: 2022-01-01 | End: 2022-01-01

## 2022-01-01 RX ORDER — VANCOMYCIN HCL IN 5 % DEXTROSE 1G/250ML
1000 PLASTIC BAG, INJECTION (ML) INTRAVENOUS ONCE
Status: COMPLETED | OUTPATIENT
Start: 2022-01-01 | End: 2022-01-01

## 2022-01-01 RX ORDER — PHENYLEPHRINE HCL IN 0.9% NACL 1 MG/10 ML
SYRINGE (ML) INTRAVENOUS
Status: COMPLETED
Start: 2022-01-01 | End: 2022-01-01

## 2022-01-01 RX ORDER — TALC
6 POWDER (GRAM) TOPICAL NIGHTLY PRN
Status: DISCONTINUED | OUTPATIENT
Start: 2022-01-01 | End: 2022-01-01

## 2022-01-01 RX ORDER — AMIODARONE HYDROCHLORIDE 150 MG/3ML
300 INJECTION, SOLUTION INTRAVENOUS ONCE
Status: COMPLETED | OUTPATIENT
Start: 2022-01-01 | End: 2022-01-01

## 2022-01-01 RX ORDER — METOPROLOL TARTRATE 1 MG/ML
5 INJECTION, SOLUTION INTRAVENOUS ONCE
Status: COMPLETED | OUTPATIENT
Start: 2022-01-01 | End: 2022-01-01

## 2022-01-01 RX ORDER — DIGOXIN 0.25 MG/ML
INJECTION INTRAMUSCULAR; INTRAVENOUS
Status: COMPLETED
Start: 2022-01-01 | End: 2022-01-01

## 2022-01-01 RX ORDER — PREDNISONE 20 MG/1
40 TABLET ORAL DAILY
Status: DISCONTINUED | OUTPATIENT
Start: 2022-01-01 | End: 2022-01-01

## 2022-01-01 RX ORDER — IBUPROFEN 200 MG
24 TABLET ORAL
Status: DISCONTINUED | OUTPATIENT
Start: 2022-01-01 | End: 2022-01-01

## 2022-01-01 RX ORDER — ESCITALOPRAM OXALATE 10 MG/1
TABLET ORAL
Qty: 90 TABLET | Refills: 0 | Status: SHIPPED | OUTPATIENT
Start: 2022-01-01

## 2022-01-01 RX ORDER — ACETAMINOPHEN 650 MG/20.3ML
650 LIQUID ORAL EVERY 6 HOURS PRN
Status: DISCONTINUED | OUTPATIENT
Start: 2022-01-01 | End: 2022-01-01

## 2022-01-01 RX ORDER — CALCIUM GLUCONATE 20 MG/ML
1 INJECTION, SOLUTION INTRAVENOUS ONCE
Status: COMPLETED | OUTPATIENT
Start: 2022-01-01 | End: 2022-01-01

## 2022-01-01 RX ORDER — METOPROLOL TARTRATE 1 MG/ML
INJECTION, SOLUTION INTRAVENOUS
Status: COMPLETED
Start: 2022-01-01 | End: 2022-01-01

## 2022-01-01 RX ORDER — OXYCODONE AND ACETAMINOPHEN 5; 325 MG/1; MG/1
1 TABLET ORAL EVERY 6 HOURS PRN
Status: DISCONTINUED | OUTPATIENT
Start: 2022-01-01 | End: 2022-01-01

## 2022-01-01 RX ORDER — FUROSEMIDE 10 MG/ML
INJECTION INTRAMUSCULAR; INTRAVENOUS
Status: COMPLETED
Start: 2022-01-01 | End: 2022-01-01

## 2022-01-01 RX ORDER — SODIUM CHLORIDE 9 MG/ML
INJECTION, SOLUTION INTRAVENOUS ONCE
Status: COMPLETED | OUTPATIENT
Start: 2022-01-01 | End: 2022-01-01

## 2022-01-01 RX ORDER — OSELTAMIVIR PHOSPHATE 75 MG/1
75 CAPSULE ORAL DAILY
Status: DISCONTINUED | OUTPATIENT
Start: 2022-01-01 | End: 2022-01-01

## 2022-01-01 RX ORDER — HYDROCODONE BITARTRATE AND ACETAMINOPHEN 500; 5 MG/1; MG/1
TABLET ORAL CONTINUOUS
Status: ACTIVE | OUTPATIENT
Start: 2022-01-01 | End: 2022-01-01

## 2022-01-01 RX ORDER — DEXTROSE MONOHYDRATE AND SODIUM CHLORIDE 5; .45 G/100ML; G/100ML
125 INJECTION, SOLUTION INTRAVENOUS CONTINUOUS PRN
Status: DISCONTINUED | OUTPATIENT
Start: 2022-01-01 | End: 2022-01-01

## 2022-01-01 RX ORDER — FUROSEMIDE 10 MG/ML
120 INJECTION INTRAMUSCULAR; INTRAVENOUS ONCE
Status: COMPLETED | OUTPATIENT
Start: 2022-01-01 | End: 2022-01-01

## 2022-01-01 RX ORDER — OSELTAMIVIR PHOSPHATE 6 MG/ML
30 FOR SUSPENSION ORAL DAILY
Status: DISCONTINUED | OUTPATIENT
Start: 2022-01-01 | End: 2022-01-01

## 2022-01-01 RX ORDER — SODIUM,POTASSIUM PHOSPHATES 280-250MG
2 POWDER IN PACKET (EA) ORAL
Status: DISCONTINUED | OUTPATIENT
Start: 2022-01-01 | End: 2022-01-01

## 2022-01-01 RX ORDER — DILTIAZEM HYDROCHLORIDE 5 MG/ML
5 INJECTION INTRAVENOUS ONCE
Status: COMPLETED | OUTPATIENT
Start: 2022-01-01 | End: 2022-01-01

## 2022-01-01 RX ORDER — LORAZEPAM 2 MG/ML
2 INJECTION INTRAMUSCULAR
Status: DISCONTINUED | OUTPATIENT
Start: 2022-01-01 | End: 2022-01-01

## 2022-01-01 RX ORDER — MAGNESIUM SULFATE HEPTAHYDRATE 40 MG/ML
INJECTION, SOLUTION INTRAVENOUS
Status: COMPLETED
Start: 2022-01-01 | End: 2022-01-01

## 2022-01-01 RX ORDER — LIDOCAINE HYDROCHLORIDE 10 MG/ML
INJECTION, SOLUTION EPIDURAL; INFILTRATION; INTRACAUDAL; PERINEURAL
Status: COMPLETED
Start: 2022-01-01 | End: 2022-01-01

## 2022-01-01 RX ORDER — ENOXAPARIN SODIUM 100 MG/ML
40 INJECTION SUBCUTANEOUS EVERY 24 HOURS
Status: DISCONTINUED | OUTPATIENT
Start: 2022-01-01 | End: 2022-01-01 | Stop reason: DRUGHIGH

## 2022-01-01 RX ORDER — ESCITALOPRAM OXALATE 10 MG/1
10 TABLET ORAL DAILY
Status: DISCONTINUED | OUTPATIENT
Start: 2022-01-01 | End: 2022-01-01

## 2022-01-01 RX ORDER — SODIUM CHLORIDE 0.9 % (FLUSH) 0.9 %
10 SYRINGE (ML) INJECTION
Status: DISCONTINUED | OUTPATIENT
Start: 2022-01-01 | End: 2022-01-01

## 2022-01-01 RX ORDER — FENTANYL CITRATE-0.9 % NACL/PF 10 MCG/ML
0-250 PLASTIC BAG, INJECTION (ML) INTRAVENOUS CONTINUOUS
Status: DISCONTINUED | OUTPATIENT
Start: 2022-01-01 | End: 2022-01-01

## 2022-01-01 RX ORDER — ATROPINE SULFATE 0.1 MG/ML
INJECTION INTRAVENOUS
Status: COMPLETED
Start: 2022-01-01 | End: 2022-01-01

## 2022-01-01 RX ORDER — FENTANYL CITRATE 50 UG/ML
100 INJECTION, SOLUTION INTRAMUSCULAR; INTRAVENOUS ONCE
Status: COMPLETED | OUTPATIENT
Start: 2022-01-01 | End: 2022-01-01

## 2022-01-01 RX ORDER — SODIUM CHLORIDE 9 MG/ML
INJECTION, SOLUTION INTRAVENOUS
Status: DISCONTINUED | OUTPATIENT
Start: 2022-01-01 | End: 2022-01-01

## 2022-01-01 RX ORDER — ENOXAPARIN SODIUM 100 MG/ML
40 INJECTION SUBCUTANEOUS EVERY 24 HOURS
Status: DISCONTINUED | OUTPATIENT
Start: 2022-01-01 | End: 2022-01-01

## 2022-01-01 RX ORDER — CEFAZOLIN SODIUM 1 G/50ML
1 SOLUTION INTRAVENOUS
Status: DISCONTINUED | OUTPATIENT
Start: 2022-01-01 | End: 2022-01-01

## 2022-01-01 RX ORDER — IBUPROFEN 200 MG
16 TABLET ORAL
Status: DISCONTINUED | OUTPATIENT
Start: 2022-01-01 | End: 2022-01-01

## 2022-01-01 RX ORDER — DEXMEDETOMIDINE HYDROCHLORIDE 4 UG/ML
0-1.4 INJECTION, SOLUTION INTRAVENOUS CONTINUOUS
Status: DISCONTINUED | OUTPATIENT
Start: 2022-01-01 | End: 2022-01-01

## 2022-01-01 RX ORDER — ETOMIDATE 2 MG/ML
INJECTION INTRAVENOUS
Status: COMPLETED
Start: 2022-01-01 | End: 2022-01-01

## 2022-01-01 RX ORDER — NOREPINEPHRINE BITARTRATE/D5W 4MG/250ML
PLASTIC BAG, INJECTION (ML) INTRAVENOUS
Status: COMPLETED
Start: 2022-01-01 | End: 2022-01-01

## 2022-01-01 RX ORDER — ENOXAPARIN SODIUM 100 MG/ML
30 INJECTION SUBCUTANEOUS EVERY 24 HOURS
Status: DISCONTINUED | OUTPATIENT
Start: 2022-01-01 | End: 2022-01-01

## 2022-01-01 RX ORDER — INSULIN ASPART 100 [IU]/ML
10 INJECTION, SOLUTION INTRAVENOUS; SUBCUTANEOUS ONCE
Status: COMPLETED | OUTPATIENT
Start: 2022-01-01 | End: 2022-01-01

## 2022-01-01 RX ORDER — MEROPENEM AND SODIUM CHLORIDE 500 MG/50ML
500 INJECTION, SOLUTION INTRAVENOUS DAILY
Status: DISCONTINUED | OUTPATIENT
Start: 2022-01-01 | End: 2022-01-01

## 2022-01-01 RX ORDER — SODIUM CHLORIDE 9 MG/ML
INJECTION, SOLUTION INTRAVENOUS ONCE
Status: DISCONTINUED | OUTPATIENT
Start: 2022-01-01 | End: 2022-01-01

## 2022-01-01 RX ORDER — HALOPERIDOL 5 MG/ML
1 INJECTION INTRAMUSCULAR EVERY 4 HOURS PRN
Status: DISCONTINUED | OUTPATIENT
Start: 2022-01-01 | End: 2022-01-01 | Stop reason: HOSPADM

## 2022-01-01 RX ORDER — HEPARIN SODIUM 1000 [USP'U]/ML
2400 INJECTION, SOLUTION INTRAVENOUS; SUBCUTANEOUS
Status: DISCONTINUED | OUTPATIENT
Start: 2022-01-01 | End: 2022-01-01

## 2022-01-01 RX ORDER — INSULIN ASPART 100 [IU]/ML
10 INJECTION, SOLUTION INTRAVENOUS; SUBCUTANEOUS 4 TIMES DAILY
Status: DISCONTINUED | OUTPATIENT
Start: 2022-01-01 | End: 2022-01-01

## 2022-01-01 RX ORDER — EPINEPHRINE 1 MG/ML
1 INJECTION, SOLUTION, CONCENTRATE INTRAVENOUS ONCE
Status: DISCONTINUED | OUTPATIENT
Start: 2022-01-01 | End: 2022-01-01

## 2022-01-01 RX ORDER — PROPOFOL 10 MG/ML
INJECTION, EMULSION INTRAVENOUS
Status: DISPENSED
Start: 2022-01-01 | End: 2022-01-01

## 2022-01-01 RX ORDER — ROCURONIUM BROMIDE 10 MG/ML
INJECTION, SOLUTION INTRAVENOUS
Status: COMPLETED
Start: 2022-01-01 | End: 2022-01-01

## 2022-01-01 RX ORDER — INSULIN ASPART 100 [IU]/ML
1-10 INJECTION, SOLUTION INTRAVENOUS; SUBCUTANEOUS EVERY 6 HOURS PRN
Status: DISCONTINUED | OUTPATIENT
Start: 2022-01-01 | End: 2022-01-01

## 2022-01-01 RX ORDER — INSULIN ASPART 100 [IU]/ML
1-10 INJECTION, SOLUTION INTRAVENOUS; SUBCUTANEOUS EVERY 4 HOURS PRN
Status: DISCONTINUED | OUTPATIENT
Start: 2022-01-01 | End: 2022-01-01

## 2022-01-01 RX ORDER — INDOMETHACIN 25 MG/1
50 CAPSULE ORAL ONCE
Status: COMPLETED | OUTPATIENT
Start: 2022-01-01 | End: 2022-01-01

## 2022-01-01 RX ORDER — IPRATROPIUM BROMIDE AND ALBUTEROL SULFATE 2.5; .5 MG/3ML; MG/3ML
3 SOLUTION RESPIRATORY (INHALATION)
Status: DISCONTINUED | OUTPATIENT
Start: 2022-01-01 | End: 2022-01-01

## 2022-01-01 RX ORDER — POTASSIUM CHLORIDE 7.45 MG/ML
10 INJECTION INTRAVENOUS
Status: DISCONTINUED | OUTPATIENT
Start: 2022-01-01 | End: 2022-01-01

## 2022-01-01 RX ORDER — MIDAZOLAM HYDROCHLORIDE 1 MG/ML
INJECTION INTRAMUSCULAR; INTRAVENOUS
Status: COMPLETED
Start: 2022-01-01 | End: 2022-01-01

## 2022-01-01 RX ORDER — NOREPINEPHRINE BITARTRATE/D5W 4MG/250ML
PLASTIC BAG, INJECTION (ML) INTRAVENOUS
Status: DISPENSED
Start: 2022-01-01 | End: 2022-01-01

## 2022-01-01 RX ORDER — DILTIAZEM HYDROCHLORIDE 5 MG/ML
INJECTION INTRAVENOUS
Status: COMPLETED
Start: 2022-01-01 | End: 2022-01-01

## 2022-01-01 RX ORDER — INSULIN ASPART 100 [IU]/ML
7 INJECTION, SOLUTION INTRAVENOUS; SUBCUTANEOUS
Status: DISCONTINUED | OUTPATIENT
Start: 2022-01-01 | End: 2022-01-01

## 2022-01-01 RX ORDER — ATORVASTATIN CALCIUM 40 MG/1
40 TABLET, FILM COATED ORAL NIGHTLY
Status: DISCONTINUED | OUTPATIENT
Start: 2022-01-01 | End: 2022-01-01

## 2022-01-01 RX ORDER — FENTANYL CITRATE 50 UG/ML
INJECTION, SOLUTION INTRAMUSCULAR; INTRAVENOUS
Status: DISPENSED
Start: 2022-01-01 | End: 2022-01-01

## 2022-01-01 RX ORDER — POTASSIUM CHLORIDE 7.45 MG/ML
10 INJECTION INTRAVENOUS
Status: COMPLETED | OUTPATIENT
Start: 2022-01-01 | End: 2022-01-01

## 2022-01-01 RX ORDER — AMLODIPINE BESYLATE 5 MG/1
5 TABLET ORAL DAILY
Status: DISCONTINUED | OUTPATIENT
Start: 2022-01-01 | End: 2022-01-01

## 2022-01-01 RX ORDER — ROCURONIUM BROMIDE 10 MG/ML
1 INJECTION, SOLUTION INTRAVENOUS ONCE
Status: COMPLETED | OUTPATIENT
Start: 2022-01-01 | End: 2022-01-01

## 2022-01-01 RX ORDER — PHENYLEPHRINE HCL IN 0.9% NACL 1 MG/10 ML
100 SYRINGE (ML) INTRAVENOUS ONCE
Status: COMPLETED | OUTPATIENT
Start: 2022-01-01 | End: 2022-01-01

## 2022-01-01 RX ORDER — ONDANSETRON 2 MG/ML
4 INJECTION INTRAMUSCULAR; INTRAVENOUS EVERY 8 HOURS PRN
Status: DISCONTINUED | OUTPATIENT
Start: 2022-01-01 | End: 2022-01-01

## 2022-01-01 RX ORDER — HEPARIN SODIUM 200 [USP'U]/100ML
INJECTION, SOLUTION INTRAVENOUS
Status: DISCONTINUED | OUTPATIENT
Start: 2022-01-01 | End: 2022-01-01

## 2022-01-01 RX ORDER — INSULIN ASPART 100 [IU]/ML
8 INJECTION, SOLUTION INTRAVENOUS; SUBCUTANEOUS 4 TIMES DAILY
Status: DISCONTINUED | OUTPATIENT
Start: 2022-01-01 | End: 2022-01-01

## 2022-01-01 RX ORDER — PROPOFOL 10 MG/ML
INJECTION, EMULSION INTRAVENOUS
Status: COMPLETED
Start: 2022-01-01 | End: 2022-01-01

## 2022-01-01 RX ORDER — MAGNESIUM SULFATE HEPTAHYDRATE 40 MG/ML
2 INJECTION, SOLUTION INTRAVENOUS ONCE
Status: COMPLETED | OUTPATIENT
Start: 2022-01-01 | End: 2022-01-01

## 2022-01-01 RX ORDER — AZITHROMYCIN 250 MG/1
500 TABLET, FILM COATED ORAL DAILY
Status: COMPLETED | OUTPATIENT
Start: 2022-01-01 | End: 2022-01-01

## 2022-01-01 RX ORDER — LIDOCAINE HYDROCHLORIDE 10 MG/ML
10 INJECTION, SOLUTION EPIDURAL; INFILTRATION; INTRACAUDAL; PERINEURAL ONCE
Status: COMPLETED | OUTPATIENT
Start: 2022-01-01 | End: 2022-01-01

## 2022-01-01 RX ORDER — FUROSEMIDE 10 MG/ML
40 INJECTION INTRAMUSCULAR; INTRAVENOUS ONCE
Status: COMPLETED | OUTPATIENT
Start: 2022-01-01 | End: 2022-01-01

## 2022-01-01 RX ORDER — FUROSEMIDE 10 MG/ML
80 INJECTION INTRAMUSCULAR; INTRAVENOUS ONCE
Status: COMPLETED | OUTPATIENT
Start: 2022-01-01 | End: 2022-01-01

## 2022-01-01 RX ORDER — IPRATROPIUM BROMIDE AND ALBUTEROL SULFATE 2.5; .5 MG/3ML; MG/3ML
3 SOLUTION RESPIRATORY (INHALATION) EVERY 4 HOURS
Status: DISCONTINUED | OUTPATIENT
Start: 2022-01-01 | End: 2022-01-01

## 2022-01-01 RX ORDER — FUROSEMIDE 10 MG/ML
20 INJECTION INTRAMUSCULAR; INTRAVENOUS ONCE
Status: COMPLETED | OUTPATIENT
Start: 2022-01-01 | End: 2022-01-01

## 2022-01-01 RX ORDER — LIDOCAINE HYDROCHLORIDE 10 MG/ML
INJECTION, SOLUTION EPIDURAL; INFILTRATION; INTRACAUDAL; PERINEURAL
Status: DISPENSED
Start: 2022-01-01 | End: 2022-01-01

## 2022-01-01 RX ORDER — DIGOXIN 0.25 MG/ML
250 INJECTION INTRAMUSCULAR; INTRAVENOUS ONCE
Status: COMPLETED | OUTPATIENT
Start: 2022-01-01 | End: 2022-01-01

## 2022-01-01 RX ORDER — ALBUTEROL SULFATE 2.5 MG/.5ML
2.5 SOLUTION RESPIRATORY (INHALATION)
Status: CANCELLED | OUTPATIENT
Start: 2022-01-01

## 2022-01-01 RX ORDER — ALBUTEROL SULFATE 2.5 MG/.5ML
2.5 SOLUTION RESPIRATORY (INHALATION)
Status: DISCONTINUED | OUTPATIENT
Start: 2022-01-01 | End: 2022-01-01

## 2022-01-01 RX ORDER — FUROSEMIDE 10 MG/ML
100 INJECTION INTRAMUSCULAR; INTRAVENOUS ONCE
Status: COMPLETED | OUTPATIENT
Start: 2022-01-01 | End: 2022-01-01

## 2022-01-01 RX ORDER — MAGNESIUM SULFATE HEPTAHYDRATE 40 MG/ML
2 INJECTION, SOLUTION INTRAVENOUS
Status: DISCONTINUED | OUTPATIENT
Start: 2022-01-01 | End: 2022-01-01

## 2022-01-01 RX ORDER — CALCIUM GLUCONATE 20 MG/ML
INJECTION, SOLUTION INTRAVENOUS
Status: COMPLETED
Start: 2022-01-01 | End: 2022-01-01

## 2022-01-01 RX ORDER — SODIUM CHLORIDE 9 MG/ML
125 INJECTION, SOLUTION INTRAVENOUS CONTINUOUS
Status: DISCONTINUED | OUTPATIENT
Start: 2022-01-01 | End: 2022-01-01

## 2022-01-01 RX ORDER — DIPHENHYDRAMINE HCL 25 MG
50 CAPSULE ORAL ONCE
Status: CANCELLED | OUTPATIENT
Start: 2022-01-01 | End: 2022-01-01

## 2022-01-01 RX ORDER — LORAZEPAM 2 MG/ML
2 INJECTION INTRAMUSCULAR
Status: DISCONTINUED | OUTPATIENT
Start: 2022-01-01 | End: 2022-01-01 | Stop reason: HOSPADM

## 2022-01-01 RX ORDER — DILTIAZEM HYDROCHLORIDE 5 MG/ML
10 INJECTION INTRAVENOUS ONCE
Status: COMPLETED | OUTPATIENT
Start: 2022-01-01 | End: 2022-01-01

## 2022-01-01 RX ORDER — METOPROLOL TARTRATE 25 MG/1
25 TABLET, FILM COATED ORAL 2 TIMES DAILY
Status: DISCONTINUED | OUTPATIENT
Start: 2022-01-01 | End: 2022-01-01

## 2022-01-01 RX ORDER — SODIUM CHLORIDE FOR INHALATION 3 %
4 VIAL, NEBULIZER (ML) INHALATION
Status: CANCELLED | OUTPATIENT
Start: 2022-01-01

## 2022-01-01 RX ORDER — LORAZEPAM 0.5 MG/1
1 TABLET ORAL EVERY 30 MIN PRN
Status: DISCONTINUED | OUTPATIENT
Start: 2022-01-01 | End: 2022-01-01

## 2022-01-01 RX ORDER — MAGNESIUM SULFATE HEPTAHYDRATE 40 MG/ML
2 INJECTION, SOLUTION INTRAVENOUS
Status: ACTIVE | OUTPATIENT
Start: 2022-01-01 | End: 2022-01-01

## 2022-01-01 RX ORDER — METOPROLOL TARTRATE 1 MG/ML
5 INJECTION, SOLUTION INTRAVENOUS ONCE
Status: DISCONTINUED | OUTPATIENT
Start: 2022-01-01 | End: 2022-01-01

## 2022-01-01 RX ORDER — ASPIRIN 81 MG/1
81 TABLET ORAL
COMMUNITY

## 2022-01-01 RX ORDER — ACETAMINOPHEN 325 MG/1
650 TABLET ORAL EVERY 4 HOURS PRN
Status: DISCONTINUED | OUTPATIENT
Start: 2022-01-01 | End: 2022-01-01

## 2022-01-01 RX ORDER — SODIUM CHLORIDE FOR INHALATION 3 %
4 VIAL, NEBULIZER (ML) INHALATION
Status: DISCONTINUED | OUTPATIENT
Start: 2022-01-01 | End: 2022-01-01

## 2022-01-01 RX ADMIN — DILTIAZEM HYDROCHLORIDE 5 MG: 5 INJECTION INTRAVENOUS at 07:11

## 2022-01-01 RX ADMIN — LORAZEPAM 2 MG: 2 INJECTION INTRAMUSCULAR; INTRAVENOUS at 04:11

## 2022-01-01 RX ADMIN — FUROSEMIDE 80 MG: 10 INJECTION, SOLUTION INTRAVENOUS at 08:11

## 2022-01-01 RX ADMIN — IPRATROPIUM BROMIDE AND ALBUTEROL SULFATE 3 ML: 2.5; .5 SOLUTION RESPIRATORY (INHALATION) at 07:11

## 2022-01-01 RX ADMIN — Medication 25 MCG/HR: at 06:11

## 2022-01-01 RX ADMIN — ENOXAPARIN SODIUM 40 MG: 40 INJECTION SUBCUTANEOUS at 04:11

## 2022-01-01 RX ADMIN — SODIUM CHLORIDE: 0.9 INJECTION, SOLUTION INTRAVENOUS at 01:11

## 2022-01-01 RX ADMIN — POLYETHYLENE GLYCOL 3350 17 G: 17 POWDER, FOR SOLUTION ORAL at 09:11

## 2022-01-01 RX ADMIN — ENOXAPARIN SODIUM 30 MG: 30 INJECTION SUBCUTANEOUS at 05:11

## 2022-01-01 RX ADMIN — SODIUM CHLORIDE: 0.9 INJECTION, SOLUTION INTRAVENOUS at 05:11

## 2022-01-01 RX ADMIN — INSULIN ASPART 6 UNITS: 100 INJECTION, SOLUTION INTRAVENOUS; SUBCUTANEOUS at 11:11

## 2022-01-01 RX ADMIN — DOCUSATE SODIUM - SENNOSIDES 1 TABLET: 50; 8.6 TABLET, FILM COATED ORAL at 09:11

## 2022-01-01 RX ADMIN — METHYLPREDNISOLONE SODIUM SUCCINATE 60 MG: 40 INJECTION, POWDER, FOR SOLUTION INTRAMUSCULAR; INTRAVENOUS at 05:11

## 2022-01-01 RX ADMIN — IPRATROPIUM BROMIDE AND ALBUTEROL SULFATE 3 ML: 2.5; .5 SOLUTION RESPIRATORY (INHALATION) at 08:11

## 2022-01-01 RX ADMIN — PREDNISONE 40 MG: 20 TABLET ORAL at 08:11

## 2022-01-01 RX ADMIN — SODIUM CHLORIDE, SODIUM LACTATE, POTASSIUM CHLORIDE, AND CALCIUM CHLORIDE 1000 ML: .6; .31; .03; .02 INJECTION, SOLUTION INTRAVENOUS at 12:11

## 2022-01-01 RX ADMIN — EPINEPHRINE 1 MCG/KG/MIN: 1 INJECTION INTRAMUSCULAR; INTRAVENOUS; SUBCUTANEOUS at 07:11

## 2022-01-01 RX ADMIN — MUPIROCIN: 20 OINTMENT TOPICAL at 08:11

## 2022-01-01 RX ADMIN — SODIUM CHLORIDE 125 ML/HR: 0.9 INJECTION, SOLUTION INTRAVENOUS at 02:11

## 2022-01-01 RX ADMIN — AMIODARONE HYDROCHLORIDE 1 MG/MIN: 1.8 INJECTION, SOLUTION INTRAVENOUS at 11:11

## 2022-01-01 RX ADMIN — PROPOFOL 35 MCG/KG/MIN: 10 INJECTION, EMULSION INTRAVENOUS at 07:11

## 2022-01-01 RX ADMIN — METOROPROLOL TARTRATE 5 MG: 5 INJECTION, SOLUTION INTRAVENOUS at 09:11

## 2022-01-01 RX ADMIN — SODIUM CHLORIDE 500 ML: 0.9 INJECTION, SOLUTION INTRAVENOUS at 06:11

## 2022-01-01 RX ADMIN — PANTOPRAZOLE SODIUM 40 MG: 40 INJECTION, POWDER, FOR SOLUTION INTRAVENOUS at 09:11

## 2022-01-01 RX ADMIN — PROPOFOL 10 MCG/KG/MIN: 10 INJECTION, EMULSION INTRAVENOUS at 12:11

## 2022-01-01 RX ADMIN — VANCOMYCIN HYDROCHLORIDE 1000 MG: 1 INJECTION, POWDER, LYOPHILIZED, FOR SOLUTION INTRAVENOUS at 06:11

## 2022-01-01 RX ADMIN — ROCURONIUM BROMIDE 90 MG: 10 SOLUTION INTRAVENOUS at 08:11

## 2022-01-01 RX ADMIN — Medication 0.02 MCG/KG/MIN: at 08:11

## 2022-01-01 RX ADMIN — ASPIRIN 81 MG: 81 TABLET, COATED ORAL at 08:11

## 2022-01-01 RX ADMIN — ROCURONIUM BROMIDE 90 MG: 10 INJECTION, SOLUTION INTRAVENOUS at 08:11

## 2022-01-01 RX ADMIN — CEFAZOLIN SODIUM 2 G: 2 SOLUTION INTRAVENOUS at 04:11

## 2022-01-01 RX ADMIN — FUROSEMIDE 80 MG: 10 INJECTION, SOLUTION INTRAMUSCULAR; INTRAVENOUS at 11:11

## 2022-01-01 RX ADMIN — METOPROLOL TARTRATE 25 MG: 25 TABLET, FILM COATED ORAL at 09:11

## 2022-01-01 RX ADMIN — ACETAMINOPHEN 650 MG: 325 TABLET ORAL at 02:11

## 2022-01-01 RX ADMIN — SODIUM CHLORIDE 10 UNITS/HR: 9 INJECTION, SOLUTION INTRAVENOUS at 06:11

## 2022-01-01 RX ADMIN — INSULIN ASPART 10 UNITS: 100 INJECTION, SOLUTION INTRAVENOUS; SUBCUTANEOUS at 04:11

## 2022-01-01 RX ADMIN — SODIUM CHLORIDE 3.9 UNITS/HR: 9 INJECTION, SOLUTION INTRAVENOUS at 05:11

## 2022-01-01 RX ADMIN — INSULIN ASPART 4 UNITS: 100 INJECTION, SOLUTION INTRAVENOUS; SUBCUTANEOUS at 05:11

## 2022-01-01 RX ADMIN — HYDROCORTISONE SODIUM SUCCINATE 100 MG: 100 INJECTION, POWDER, FOR SOLUTION INTRAMUSCULAR; INTRAVENOUS at 04:11

## 2022-01-01 RX ADMIN — IPRATROPIUM BROMIDE AND ALBUTEROL SULFATE 3 ML: 2.5; .5 SOLUTION RESPIRATORY (INHALATION) at 01:11

## 2022-01-01 RX ADMIN — ASPIRIN 81 MG: 81 TABLET, CHEWABLE ORAL at 08:11

## 2022-01-01 RX ADMIN — INSULIN ASPART 2 UNITS: 100 INJECTION, SOLUTION INTRAVENOUS; SUBCUTANEOUS at 12:11

## 2022-01-01 RX ADMIN — HEPARIN SODIUM 2400 UNITS: 1000 INJECTION, SOLUTION INTRAVENOUS; SUBCUTANEOUS at 05:11

## 2022-01-01 RX ADMIN — POTASSIUM CHLORIDE 10 MEQ: 7.46 INJECTION, SOLUTION INTRAVENOUS at 06:11

## 2022-01-01 RX ADMIN — DEXTROSE MONOHYDRATE 0.12 MCG/KG/MIN: 5 INJECTION, SOLUTION INTRAVENOUS at 05:11

## 2022-01-01 RX ADMIN — INSULIN ASPART 4 UNITS: 100 INJECTION, SOLUTION INTRAVENOUS; SUBCUTANEOUS at 04:11

## 2022-01-01 RX ADMIN — POTASSIUM CHLORIDE 10 MEQ: 7.46 INJECTION, SOLUTION INTRAVENOUS at 02:11

## 2022-01-01 RX ADMIN — LIDOCAINE HYDROCHLORIDE 100 MG: 10 INJECTION, SOLUTION EPIDURAL; INFILTRATION; INTRACAUDAL; PERINEURAL at 01:11

## 2022-01-01 RX ADMIN — IPRATROPIUM BROMIDE AND ALBUTEROL SULFATE 3 ML: 2.5; .5 SOLUTION RESPIRATORY (INHALATION) at 11:11

## 2022-01-01 RX ADMIN — OSELTAMIVIR PHOSPHATE 30 MG: 6 POWDER, FOR SUSPENSION ORAL at 08:11

## 2022-01-01 RX ADMIN — DOCUSATE SODIUM - SENNOSIDES 1 TABLET: 50; 8.6 TABLET, FILM COATED ORAL at 08:11

## 2022-01-01 RX ADMIN — FUROSEMIDE 20 MG: 10 INJECTION, SOLUTION INTRAMUSCULAR; INTRAVENOUS at 10:11

## 2022-01-01 RX ADMIN — AMIODARONE HYDROCHLORIDE 0.5 MG/MIN: 1.8 INJECTION, SOLUTION INTRAVENOUS at 01:11

## 2022-01-01 RX ADMIN — ETOMIDATE INJECTION 27 MG: 2 SOLUTION INTRAVENOUS at 08:11

## 2022-01-01 RX ADMIN — PROPOFOL 10 MCG/KG/MIN: 10 INJECTION, EMULSION INTRAVENOUS at 08:11

## 2022-01-01 RX ADMIN — AMIODARONE HYDROCHLORIDE 1 MG/MIN: 1.8 INJECTION, SOLUTION INTRAVENOUS at 01:11

## 2022-01-01 RX ADMIN — PROPOFOL 25 MCG/KG/MIN: 10 INJECTION, EMULSION INTRAVENOUS at 10:11

## 2022-01-01 RX ADMIN — CEFAZOLIN SODIUM 2 G: 2 SOLUTION INTRAVENOUS at 05:11

## 2022-01-01 RX ADMIN — IPRATROPIUM BROMIDE AND ALBUTEROL SULFATE 3 ML: 2.5; .5 SOLUTION RESPIRATORY (INHALATION) at 03:11

## 2022-01-01 RX ADMIN — Medication 100 MCG: at 08:11

## 2022-01-01 RX ADMIN — ASPIRIN 81 MG: 81 TABLET, CHEWABLE ORAL at 09:11

## 2022-01-01 RX ADMIN — VASOPRESSIN 0.04 UNITS/MIN: 20 INJECTION INTRAVENOUS at 09:11

## 2022-01-01 RX ADMIN — PROPOFOL 20 MCG/KG/MIN: 10 INJECTION, EMULSION INTRAVENOUS at 12:11

## 2022-01-01 RX ADMIN — POTASSIUM CHLORIDE 10 MEQ: 7.46 INJECTION, SOLUTION INTRAVENOUS at 12:11

## 2022-01-01 RX ADMIN — AMIODARONE HYDROCHLORIDE 0.5 MG/MIN: 1.8 INJECTION, SOLUTION INTRAVENOUS at 11:11

## 2022-01-01 RX ADMIN — ATORVASTATIN CALCIUM 40 MG: 40 TABLET, FILM COATED ORAL at 08:11

## 2022-01-01 RX ADMIN — METOPROLOL TARTRATE 25 MG: 25 TABLET, FILM COATED ORAL at 08:11

## 2022-01-01 RX ADMIN — AZITHROMYCIN MONOHYDRATE 500 MG: 250 TABLET ORAL at 12:11

## 2022-01-01 RX ADMIN — SODIUM BICARBONATE 50 MEQ: 84 INJECTION, SOLUTION INTRAVENOUS at 10:11

## 2022-01-01 RX ADMIN — METHYLPREDNISOLONE SODIUM SUCCINATE 60 MG: 40 INJECTION, POWDER, FOR SOLUTION INTRAMUSCULAR; INTRAVENOUS at 10:11

## 2022-01-01 RX ADMIN — INSULIN ASPART 10 UNITS: 100 INJECTION, SOLUTION INTRAVENOUS; SUBCUTANEOUS at 06:11

## 2022-01-01 RX ADMIN — SODIUM CHLORIDE 500 ML: 0.9 INJECTION, SOLUTION INTRAVENOUS at 12:11

## 2022-01-01 RX ADMIN — AMIODARONE HYDROCHLORIDE 200 MG: 200 TABLET ORAL at 09:11

## 2022-01-01 RX ADMIN — PROPOFOL 15 MCG/KG/MIN: 10 INJECTION, EMULSION INTRAVENOUS at 06:11

## 2022-01-01 RX ADMIN — INSULIN ASPART 8 UNITS: 100 INJECTION, SOLUTION INTRAVENOUS; SUBCUTANEOUS at 04:11

## 2022-01-01 RX ADMIN — OXYCODONE HYDROCHLORIDE AND ACETAMINOPHEN 1 TABLET: 5; 325 TABLET ORAL at 03:11

## 2022-01-01 RX ADMIN — DEXMEDETOMIDINE HYDROCHLORIDE 0.4 MCG/KG/HR: 4 INJECTION, SOLUTION INTRAVENOUS at 05:11

## 2022-01-01 RX ADMIN — MUPIROCIN: 20 OINTMENT TOPICAL at 09:11

## 2022-01-01 RX ADMIN — INSULIN ASPART 10 UNITS: 100 INJECTION, SOLUTION INTRAVENOUS; SUBCUTANEOUS at 08:11

## 2022-01-01 RX ADMIN — Medication 0.02 MCG/KG/MIN: at 11:11

## 2022-01-01 RX ADMIN — ENOXAPARIN SODIUM 30 MG: 30 INJECTION SUBCUTANEOUS at 04:11

## 2022-01-01 RX ADMIN — EPINEPHRINE 2 MCG/KG/MIN: 1 INJECTION INTRAMUSCULAR; INTRAVENOUS; SUBCUTANEOUS at 08:11

## 2022-01-01 RX ADMIN — PROPOFOL 35 MCG/KG/MIN: 10 INJECTION, EMULSION INTRAVENOUS at 06:11

## 2022-01-01 RX ADMIN — MIDAZOLAM 0.5 MG/HR: 5 INJECTION INTRAMUSCULAR; INTRAVENOUS at 09:11

## 2022-01-01 RX ADMIN — INSULIN ASPART 6 UNITS: 100 INJECTION, SOLUTION INTRAVENOUS; SUBCUTANEOUS at 01:11

## 2022-01-01 RX ADMIN — FUROSEMIDE 120 MG: 10 INJECTION, SOLUTION INTRAMUSCULAR; INTRAVENOUS at 11:11

## 2022-01-01 RX ADMIN — AMIODARONE HYDROCHLORIDE 200 MG: 200 TABLET ORAL at 08:11

## 2022-01-01 RX ADMIN — AMIODARONE HYDROCHLORIDE 200 MG: 200 TABLET ORAL at 10:11

## 2022-01-01 RX ADMIN — PREDNISONE 40 MG: 20 TABLET ORAL at 12:11

## 2022-01-01 RX ADMIN — INSULIN ASPART 2 UNITS: 100 INJECTION, SOLUTION INTRAVENOUS; SUBCUTANEOUS at 11:11

## 2022-01-01 RX ADMIN — METHYLPREDNISOLONE SODIUM SUCCINATE 60 MG: 40 INJECTION, POWDER, FOR SOLUTION INTRAMUSCULAR; INTRAVENOUS at 09:11

## 2022-01-01 RX ADMIN — ESCITALOPRAM OXALATE 10 MG: 10 TABLET ORAL at 08:11

## 2022-01-01 RX ADMIN — INDOMETHACIN 50 MEQ: 25 CAPSULE ORAL at 08:11

## 2022-01-01 RX ADMIN — ETOMIDATE 27 MG: 2 INJECTION INTRAVENOUS at 08:11

## 2022-01-01 RX ADMIN — PROPOFOL 35 MCG/KG/MIN: 10 INJECTION, EMULSION INTRAVENOUS at 01:11

## 2022-01-01 RX ADMIN — SODIUM CHLORIDE 8.8 UNITS/HR: 9 INJECTION, SOLUTION INTRAVENOUS at 01:11

## 2022-01-01 RX ADMIN — INSULIN ASPART 2 UNITS: 100 INJECTION, SOLUTION INTRAVENOUS; SUBCUTANEOUS at 09:11

## 2022-01-01 RX ADMIN — DOCUSATE SODIUM - SENNOSIDES 1 TABLET: 50; 8.6 TABLET, FILM COATED ORAL at 02:11

## 2022-01-01 RX ADMIN — CEFAZOLIN SODIUM 2 G: 2 SOLUTION INTRAVENOUS at 09:11

## 2022-01-01 RX ADMIN — SODIUM CHLORIDE 500 ML: 0.9 INJECTION, SOLUTION INTRAVENOUS at 09:11

## 2022-01-01 RX ADMIN — SODIUM CHLORIDE 5.9 UNITS/HR: 9 INJECTION, SOLUTION INTRAVENOUS at 06:11

## 2022-01-01 RX ADMIN — FUROSEMIDE 20 MG: 10 INJECTION, SOLUTION INTRAMUSCULAR; INTRAVENOUS at 09:11

## 2022-01-01 RX ADMIN — OSELTAMIVIR PHOSPHATE 75 MG: 75 CAPSULE ORAL at 09:11

## 2022-01-01 RX ADMIN — DOCUSATE SODIUM - SENNOSIDES 1 TABLET: 50; 8.6 TABLET, FILM COATED ORAL at 12:11

## 2022-01-01 RX ADMIN — INSULIN ASPART 6 UNITS: 100 INJECTION, SOLUTION INTRAVENOUS; SUBCUTANEOUS at 08:11

## 2022-01-01 RX ADMIN — PROPOFOL 10 MCG/KG/MIN: 10 INJECTION, EMULSION INTRAVENOUS at 01:11

## 2022-01-01 RX ADMIN — OXYCODONE HYDROCHLORIDE AND ACETAMINOPHEN 1 TABLET: 5; 325 TABLET ORAL at 01:11

## 2022-01-01 RX ADMIN — OSELTAMIVIR PHOSPHATE 30 MG: 6 POWDER, FOR SUSPENSION ORAL at 09:11

## 2022-01-01 RX ADMIN — SODIUM CHLORIDE: 0.9 INJECTION, SOLUTION INTRAVENOUS at 10:11

## 2022-01-01 RX ADMIN — AZITHROMYCIN MONOHYDRATE 500 MG: 250 TABLET ORAL at 08:11

## 2022-01-01 RX ADMIN — FENTANYL CITRATE 100 MCG: 50 INJECTION INTRAMUSCULAR; INTRAVENOUS at 06:11

## 2022-01-01 RX ADMIN — SODIUM BICARBONATE 50 MEQ: 84 INJECTION, SOLUTION INTRAVENOUS at 08:11

## 2022-01-01 RX ADMIN — INSULIN ASPART 5 UNITS: 100 INJECTION, SOLUTION INTRAVENOUS; SUBCUTANEOUS at 08:11

## 2022-01-01 RX ADMIN — INSULIN ASPART 3 UNITS: 100 INJECTION, SOLUTION INTRAVENOUS; SUBCUTANEOUS at 12:11

## 2022-01-01 RX ADMIN — ALTEPLASE 2 MG: 2.2 INJECTION, POWDER, LYOPHILIZED, FOR SOLUTION INTRAVENOUS at 02:11

## 2022-01-01 RX ADMIN — INSULIN ASPART 4 UNITS: 100 INJECTION, SOLUTION INTRAVENOUS; SUBCUTANEOUS at 08:11

## 2022-01-01 RX ADMIN — ATORVASTATIN CALCIUM 40 MG: 40 TABLET, FILM COATED ORAL at 09:11

## 2022-01-01 RX ADMIN — POTASSIUM BICARBONATE 50 MEQ: 978 TABLET, EFFERVESCENT ORAL at 09:11

## 2022-01-01 RX ADMIN — AZITHROMYCIN MONOHYDRATE 500 MG: 250 TABLET ORAL at 09:11

## 2022-01-01 RX ADMIN — SODIUM CHLORIDE: 0.9 INJECTION, SOLUTION INTRAVENOUS at 04:11

## 2022-01-01 RX ADMIN — IPRATROPIUM BROMIDE AND ALBUTEROL SULFATE 3 ML: 2.5; .5 SOLUTION RESPIRATORY (INHALATION) at 02:11

## 2022-01-01 RX ADMIN — AMIODARONE HYDROCHLORIDE 1 MG/MIN: 1.8 INJECTION, SOLUTION INTRAVENOUS at 08:11

## 2022-01-01 RX ADMIN — INSULIN HUMAN 8 UNITS: 100 INJECTION, SOLUTION PARENTERAL at 12:11

## 2022-01-01 RX ADMIN — MIDAZOLAM 2 MG: 1 INJECTION INTRAMUSCULAR; INTRAVENOUS at 09:11

## 2022-01-01 RX ADMIN — DILTIAZEM HYDROCHLORIDE 10 MG: 5 INJECTION INTRAVENOUS at 08:11

## 2022-01-01 RX ADMIN — AMIODARONE HYDROCHLORIDE 150 MG: 1.5 INJECTION, SOLUTION INTRAVENOUS at 07:11

## 2022-01-01 RX ADMIN — DEXMEDETOMIDINE HYDROCHLORIDE 1.3 MCG/KG/HR: 4 INJECTION, SOLUTION INTRAVENOUS at 10:11

## 2022-01-01 RX ADMIN — INSULIN ASPART 2 UNITS: 100 INJECTION, SOLUTION INTRAVENOUS; SUBCUTANEOUS at 03:11

## 2022-01-01 RX ADMIN — METHYLPREDNISOLONE SODIUM SUCCINATE 60 MG: 40 INJECTION, POWDER, FOR SOLUTION INTRAMUSCULAR; INTRAVENOUS at 03:11

## 2022-01-01 RX ADMIN — POTASSIUM CHLORIDE 10 MEQ: 7.46 INJECTION, SOLUTION INTRAVENOUS at 08:11

## 2022-01-01 RX ADMIN — METOPROLOL TARTRATE 25 MG: 25 TABLET, FILM COATED ORAL at 10:11

## 2022-01-01 RX ADMIN — ONDANSETRON HYDROCHLORIDE 4 MG: 2 INJECTION, SOLUTION INTRAMUSCULAR; INTRAVENOUS at 01:11

## 2022-01-01 RX ADMIN — PROPOFOL 35 MCG/KG/MIN: 10 INJECTION, EMULSION INTRAVENOUS at 11:11

## 2022-01-01 RX ADMIN — PROPOFOL 30 MCG/KG/MIN: 10 INJECTION, EMULSION INTRAVENOUS at 06:11

## 2022-01-01 RX ADMIN — VANCOMYCIN HYDROCHLORIDE 1750 MG: 500 INJECTION, POWDER, LYOPHILIZED, FOR SOLUTION INTRAVENOUS at 03:11

## 2022-01-01 RX ADMIN — IPRATROPIUM BROMIDE AND ALBUTEROL SULFATE 3 ML: 2.5; .5 SOLUTION RESPIRATORY (INHALATION) at 12:11

## 2022-01-01 RX ADMIN — DEXMEDETOMIDINE HYDROCHLORIDE 1.4 MCG/KG/HR: 4 INJECTION, SOLUTION INTRAVENOUS at 07:11

## 2022-01-01 RX ADMIN — INSULIN ASPART 4 UNITS: 100 INJECTION, SOLUTION INTRAVENOUS; SUBCUTANEOUS at 09:11

## 2022-01-01 RX ADMIN — INSULIN ASPART 10 UNITS: 100 INJECTION, SOLUTION INTRAVENOUS; SUBCUTANEOUS at 05:11

## 2022-01-01 RX ADMIN — FUROSEMIDE 100 MG: 10 INJECTION, SOLUTION INTRAMUSCULAR; INTRAVENOUS at 11:11

## 2022-01-01 RX ADMIN — INSULIN ASPART 2 UNITS: 100 INJECTION, SOLUTION INTRAVENOUS; SUBCUTANEOUS at 06:11

## 2022-01-01 RX ADMIN — AMIODARONE HYDROCHLORIDE 0.5 MG/MIN: 1.8 INJECTION, SOLUTION INTRAVENOUS at 12:11

## 2022-01-01 RX ADMIN — PANTOPRAZOLE SODIUM 40 MG: 40 INJECTION, POWDER, FOR SOLUTION INTRAVENOUS at 08:11

## 2022-01-01 RX ADMIN — AMLODIPINE BESYLATE 5 MG: 5 TABLET ORAL at 12:11

## 2022-01-01 RX ADMIN — DEXMEDETOMIDINE HYDROCHLORIDE 1 MCG/KG/HR: 4 INJECTION, SOLUTION INTRAVENOUS at 12:11

## 2022-01-01 RX ADMIN — ESCITALOPRAM OXALATE 10 MG: 10 TABLET ORAL at 12:11

## 2022-01-01 RX ADMIN — CEFAZOLIN SODIUM 1 G: 1 SOLUTION INTRAVENOUS at 04:11

## 2022-01-01 RX ADMIN — SODIUM CHLORIDE 13 UNITS/HR: 9 INJECTION, SOLUTION INTRAVENOUS at 12:11

## 2022-01-01 RX ADMIN — Medication 75 MCG/HR: at 04:11

## 2022-01-01 RX ADMIN — DEXMEDETOMIDINE HYDROCHLORIDE 1.4 MCG/KG/HR: 4 INJECTION, SOLUTION INTRAVENOUS at 03:11

## 2022-01-01 RX ADMIN — INSULIN ASPART 1 UNITS: 100 INJECTION, SOLUTION INTRAVENOUS; SUBCUTANEOUS at 01:11

## 2022-01-01 RX ADMIN — CEFAZOLIN SODIUM 2 G: 2 SOLUTION INTRAVENOUS at 03:11

## 2022-01-01 RX ADMIN — INSULIN ASPART 2 UNITS: 100 INJECTION, SOLUTION INTRAVENOUS; SUBCUTANEOUS at 04:11

## 2022-01-01 RX ADMIN — MUPIROCIN: 20 OINTMENT TOPICAL at 12:11

## 2022-01-01 RX ADMIN — HALOPERIDOL LACTATE 1 MG: 5 INJECTION, SOLUTION INTRAMUSCULAR at 12:11

## 2022-01-01 RX ADMIN — LORAZEPAM 2 MG: 2 INJECTION INTRAMUSCULAR; INTRAVENOUS at 01:11

## 2022-01-01 RX ADMIN — AMIODARONE HYDROCHLORIDE 1 MG/MIN: 1.8 INJECTION, SOLUTION INTRAVENOUS at 06:11

## 2022-01-01 RX ADMIN — PROPOFOL 25 MCG/KG/MIN: 10 INJECTION, EMULSION INTRAVENOUS at 02:11

## 2022-01-01 RX ADMIN — INSULIN DETEMIR 10 UNITS: 100 INJECTION, SOLUTION SUBCUTANEOUS at 08:11

## 2022-01-01 RX ADMIN — MAGNESIUM SULFATE 2 G: 2 INJECTION INTRAVENOUS at 09:11

## 2022-01-01 RX ADMIN — PIPERACILLIN AND TAZOBACTAM 4.5 G: 4; .5 INJECTION, POWDER, LYOPHILIZED, FOR SOLUTION INTRAVENOUS; PARENTERAL at 06:11

## 2022-01-01 RX ADMIN — INSULIN ASPART 4 UNITS: 100 INJECTION, SOLUTION INTRAVENOUS; SUBCUTANEOUS at 12:11

## 2022-01-01 RX ADMIN — METHYLPREDNISOLONE SODIUM SUCCINATE 60 MG: 40 INJECTION, POWDER, FOR SOLUTION INTRAMUSCULAR; INTRAVENOUS at 01:11

## 2022-01-01 RX ADMIN — DIGOXIN 500 MCG: 0.25 INJECTION INTRAMUSCULAR; INTRAVENOUS at 08:11

## 2022-01-01 RX ADMIN — PROPOFOL 35 MCG/KG/MIN: 10 INJECTION, EMULSION INTRAVENOUS at 02:11

## 2022-01-01 RX ADMIN — DEXMEDETOMIDINE HYDROCHLORIDE 0.3 MCG/KG/HR: 4 INJECTION, SOLUTION INTRAVENOUS at 05:11

## 2022-01-01 RX ADMIN — ATORVASTATIN CALCIUM 40 MG: 40 TABLET, FILM COATED ORAL at 10:11

## 2022-01-01 RX ADMIN — Medication 0.02 MCG/KG/MIN: at 01:11

## 2022-01-01 RX ADMIN — ENOXAPARIN SODIUM 40 MG: 40 INJECTION SUBCUTANEOUS at 06:11

## 2022-01-01 RX ADMIN — PANTOPRAZOLE SODIUM 40 MG: 40 INJECTION, POWDER, FOR SOLUTION INTRAVENOUS at 12:11

## 2022-01-01 RX ADMIN — SODIUM CHLORIDE 8.5 UNITS/HR: 9 INJECTION, SOLUTION INTRAVENOUS at 02:11

## 2022-01-01 RX ADMIN — MAGNESIUM SULFATE HEPTAHYDRATE 2 G: 40 INJECTION, SOLUTION INTRAVENOUS at 09:11

## 2022-01-01 RX ADMIN — AMLODIPINE BESYLATE 5 MG: 5 TABLET ORAL at 08:11

## 2022-01-01 RX ADMIN — INSULIN ASPART 10 UNITS: 100 INJECTION, SOLUTION INTRAVENOUS; SUBCUTANEOUS at 01:11

## 2022-01-01 RX ADMIN — INSULIN ASPART 10 UNITS: 100 INJECTION, SOLUTION INTRAVENOUS; SUBCUTANEOUS at 11:11

## 2022-01-01 RX ADMIN — OXYCODONE HYDROCHLORIDE AND ACETAMINOPHEN 1 TABLET: 5; 325 TABLET ORAL at 08:11

## 2022-01-01 RX ADMIN — OSELTAMIVIR PHOSPHATE 75 MG: 75 CAPSULE ORAL at 12:11

## 2022-01-01 RX ADMIN — MORPHINE SULFATE 1 MG/HR: 10 INJECTION INTRAVENOUS at 12:11

## 2022-01-01 RX ADMIN — PIPERACILLIN AND TAZOBACTAM 4.5 G: 4; .5 INJECTION, POWDER, LYOPHILIZED, FOR SOLUTION INTRAVENOUS; PARENTERAL at 05:11

## 2022-01-01 RX ADMIN — AMIODARONE HYDROCHLORIDE 300 MG: 50 INJECTION, SOLUTION INTRAVENOUS at 06:11

## 2022-01-01 RX ADMIN — ACETAMINOPHEN 650 MG: 325 TABLET ORAL at 07:11

## 2022-01-01 RX ADMIN — ASPIRIN 81 MG: 81 TABLET, CHEWABLE ORAL at 01:11

## 2022-01-01 RX ADMIN — FUROSEMIDE 40 MG: 10 INJECTION, SOLUTION INTRAMUSCULAR; INTRAVENOUS at 06:11

## 2022-01-01 RX ADMIN — CEFAZOLIN SODIUM 2 G: 2 SOLUTION INTRAVENOUS at 11:11

## 2022-01-01 RX ADMIN — DOCUSATE SODIUM - SENNOSIDES 1 TABLET: 50; 8.6 TABLET, FILM COATED ORAL at 10:11

## 2022-01-01 RX ADMIN — INSULIN ASPART 2 UNITS: 100 INJECTION, SOLUTION INTRAVENOUS; SUBCUTANEOUS at 08:11

## 2022-01-01 RX ADMIN — AMIODARONE HYDROCHLORIDE 300 MG: 150 INJECTION, SOLUTION INTRAVENOUS at 06:11

## 2022-01-01 RX ADMIN — INSULIN ASPART 8 UNITS: 100 INJECTION, SOLUTION INTRAVENOUS; SUBCUTANEOUS at 03:11

## 2022-01-01 RX ADMIN — AMIODARONE HYDROCHLORIDE 0.5 MG/MIN: 1.8 INJECTION, SOLUTION INTRAVENOUS at 09:11

## 2022-01-01 RX ADMIN — CALCIUM GLUCONATE 1 G: 20 INJECTION, SOLUTION INTRAVENOUS at 08:11

## 2022-01-01 RX ADMIN — MIDAZOLAM HYDROCHLORIDE 2 MG: 1 INJECTION INTRAMUSCULAR; INTRAVENOUS at 09:11

## 2022-01-01 RX ADMIN — PROPOFOL 20 MCG/KG/MIN: 10 INJECTION, EMULSION INTRAVENOUS at 11:11

## 2022-01-01 RX ADMIN — DEXTROSE MONOHYDRATE 0.36 MCG/KG/MIN: 5 INJECTION, SOLUTION INTRAVENOUS at 06:11

## 2022-01-01 RX ADMIN — DEXTROSE 125 ML: 10 SOLUTION INTRAVENOUS at 05:11

## 2022-01-01 RX ADMIN — HEPARIN SODIUM 2400 UNITS: 1000 INJECTION, SOLUTION INTRAVENOUS; SUBCUTANEOUS at 04:11

## 2022-01-01 RX ADMIN — PIPERACILLIN AND TAZOBACTAM 4.5 G: 4; .5 INJECTION, POWDER, LYOPHILIZED, FOR SOLUTION INTRAVENOUS; PARENTERAL at 12:11

## 2022-01-01 RX ADMIN — LORAZEPAM 2 MG: 2 INJECTION INTRAMUSCULAR; INTRAVENOUS at 12:11

## 2022-01-01 RX ADMIN — PROPOFOL 10 MCG/KG/MIN: 10 INJECTION, EMULSION INTRAVENOUS at 09:11

## 2022-01-01 RX ADMIN — ACETAMINOPHEN 650 MG: 650 SOLUTION ORAL at 06:11

## 2022-01-01 RX ADMIN — INSULIN ASPART 10 UNITS: 100 INJECTION, SOLUTION INTRAVENOUS; SUBCUTANEOUS at 09:11

## 2022-01-01 RX ADMIN — INSULIN ASPART 4 UNITS: 100 INJECTION, SOLUTION INTRAVENOUS; SUBCUTANEOUS at 11:11

## 2022-01-01 RX ADMIN — VASOPRESSIN 0.04 UNITS/MIN: 20 INJECTION INTRAVENOUS at 02:11

## 2022-01-01 RX ADMIN — POTASSIUM CHLORIDE 10 MEQ: 7.46 INJECTION, SOLUTION INTRAVENOUS at 01:11

## 2022-01-01 RX ADMIN — INSULIN DETEMIR 10 UNITS: 100 INJECTION, SOLUTION SUBCUTANEOUS at 09:11

## 2022-01-01 RX ADMIN — DEXMEDETOMIDINE HYDROCHLORIDE 0.2 MCG/KG/HR: 4 INJECTION, SOLUTION INTRAVENOUS at 06:11

## 2022-01-01 RX ADMIN — FUROSEMIDE 20 MG: 10 INJECTION INTRAMUSCULAR; INTRAVENOUS at 09:11

## 2022-01-01 RX ADMIN — POLYETHYLENE GLYCOL 3350 17 G: 17 POWDER, FOR SOLUTION ORAL at 08:11

## 2022-01-01 RX ADMIN — Medication 0.1 MCG/KG/MIN: at 10:11

## 2022-01-01 RX ADMIN — DOPAMINE HYDROCHLORIDE IN DEXTROSE 2 MCG/KG/MIN: 1.6 INJECTION, SOLUTION INTRAVENOUS at 07:11

## 2022-01-01 RX ADMIN — SODIUM CHLORIDE 2000 ML: 0.9 INJECTION, SOLUTION INTRAVENOUS at 03:11

## 2022-10-25 NOTE — TELEPHONE ENCOUNTER
Care Due:                  Date            Visit Type   Department     Provider  --------------------------------------------------------------------------------                                EP -                              PRIMARY      [facility] FAMILY  Last Visit: 10-      CARE ([facility])   MEDICINE       Deric Armstrong  Next Visit: None Scheduled  None         None Found                                                            Last  Test          Frequency    Reason                     Performed    Due Date  --------------------------------------------------------------------------------    Office Visit  12 months..  EScitalopram, amLODIPine,   10-   10-                             atorvastatin.............    CMP.........  12 months..  atorvastatin.............  10-   10-    Lipid Panel.  12 months..  atorvastatin.............  10-   10-    [facility] Embedded Care Gaps. Reference number: [id number]. 10/25/2022   5:40:27 AM CDT

## 2022-10-25 NOTE — TELEPHONE ENCOUNTER
Refill Decision Note   Jose Luis Rendon  is requesting a refill authorization.  Brief Assessment and Rationale for Refill:  Approve    -Medication-Related Problems Identified:   Requires labs  Requires appointment  Medication Therapy Plan:       Medication Reconciliation Completed: No   Comments:     Provider Staff:     Action is required for this patient.   Please see care gap opportunities below in Care Due Message.     Thanks!  [facility]     Appointments      Date Provider   Last Visit   10/28/2021 Deric Armstrong MD   Next Visit   Visit date not found Deric Armstrong MD     Note composed:12:44 PM 10/25/2022           Note composed:12:44 PM 10/25/2022

## 2022-11-07 PROBLEM — J96.01 ACUTE RESPIRATORY FAILURE WITH HYPOXEMIA: Status: ACTIVE | Noted: 2022-01-01

## 2022-11-07 PROBLEM — J43.2 CENTRILOBULAR EMPHYSEMA: Status: ACTIVE | Noted: 2022-01-01

## 2022-11-07 PROBLEM — E11.10 DKA (DIABETIC KETOACIDOSIS): Status: ACTIVE | Noted: 2022-01-01

## 2022-11-07 PROBLEM — R07.9 LEFT-SIDED CHEST PAIN: Status: ACTIVE | Noted: 2022-01-01

## 2022-11-07 PROBLEM — J44.1 COPD WITH ACUTE EXACERBATION: Status: ACTIVE | Noted: 2022-01-01

## 2022-11-07 NOTE — TELEPHONE ENCOUNTER
OOC RN  Patient calling c/o  Starting a week ago, When I picked up my son and pulled something in my back.  On Friday, 11/4/22   All of a sudden I needed to throw up.   My neck muscles, left neck and left back pain.   Yesterday started with severe sob.   When I walk I get sob, and feel dizzy. With shoulder and neck pain.  Now, Experiencing bladder leakage.  That has been going on for a while.  Care Advise is to call 911   Gave OOC RN   For new or worsening symptoms jairo call back   Criss FERNANDO.   Reason for Disposition   Slow, shallow and weak breathing    Additional Information   Negative: SEVERE difficulty breathing (e.g., struggling for each breath, speaks in single words, pulse > 120)   Negative: Breathing stopped and hasn't returned   Negative: Choking on something   Negative: Bluish (or gray) lips or face   Negative: Difficult to awaken or acting confused (e.g., disoriented, slurred speech)   Negative: Passed out (i.e., fainted, collapsed and was not responding)   Negative: Wheezing started suddenly after medicine, an allergic food, or bee sting   Negative: Stridor    Protocols used: Breathing Difficulty-A-OH

## 2022-11-07 NOTE — PHARMACY MED REC
"    [facility]           Pharmacy  Admission Medication History     The home medication history was taken by Meghann Grier.      Medication history obtained from Medications listed below were obtained from: Patient/family    Based on information gathered for medication list, you may go to "Admission" then "Reconcile Home Medications" tabs to review and/or act upon those items.     The home medication list has been updated by the Pharmacy department.   Please read ALL comments highlighted in yellow.   Please address this information as you see fit.    Feel free to contact us if you have any questions or require assistance.    The medications listed below were removed from the home medication list.  Please reorder if appropriate:    Patient reports NOT TAKING the following medication(s):  Bacitracin oint  Keflex 500mg  Clobetasol cream 0.05%      No current facility-administered medications on file prior to encounter.     Current Outpatient Medications on File Prior to Encounter   Medication Sig Dispense Refill    amLODIPine (NORVASC) 5 MG tablet Take 1 tablet (5 mg total) by mouth once daily. 90 tablet 3    aspirin (ECOTRIN) 81 MG EC tablet Take 81 mg by mouth.      atorvastatin (LIPITOR) 40 MG tablet TAKE 1 TABLET(40 MG) BY MOUTH EVERY DAY (Patient taking differently: Take 40 mg by mouth once daily.) 90 tablet 3    EScitalopram oxalate (LEXAPRO) 10 MG tablet TAKE 1 TABLET(10 MG) BY MOUTH EVERY DAY (Patient taking differently: Take 10 mg by mouth once daily.) 90 tablet 0       Please address this information as you see fit.  Feel free to contact us if you have any questions or require assistance.    Meghann Grier  [phone]              .        "

## 2022-11-07 NOTE — Clinical Note
A handoff report was given to CARLOS Sharp and ICU charge. All questions answered.   Temporary pacemaker settings: rate 80 and V-output at 5.

## 2022-11-07 NOTE — HPI
67 year old male with history of HTN, lacunar infarct, anxiety and Bell's Palsy that presented to the ED for the evaluation fo 3 days worsening exertional SOB, 1 pillow orthopnea and left sided shoulder pain that radiates to back and chest pain. Patient discloses lower extremities edema but no cough or fever. Patient discloses no history of heart disease or lung disease.   Patient has significant past history of smoking (45 year pack history; stopped in 2015).  On presentation, patient was found to have mild DKA and started on IVF and insulin drip.   MICU was called for desaturation. Examination remarkable for bi-basilar crackles and lower extremities pitting edema.   CXR showed increased interstitial markings  Troponin is negative. EKG showed RBBB (old) and T wave abnormality in the inferior leads (new)  Review of old imagings, CT scan of 10/28/2021 showed centrilobular emphysema with an upper lobe predominance and multiple bilateral solid pulmonary nodules.  Bed side Cardiac and lung US showed bilateral b lines (right > left) with hyper-dynamic heart  IVF was discontinued and one dose of IV lasix ordered

## 2022-11-07 NOTE — Clinical Note
The catheter was inserted in the right ventricle. The transvenous pacing lead was secured in place in the RV, was inserted into the RV and was placed and capture was confirmed. The pacer was paced at 80 BPM 10 mA 5 mV.

## 2022-11-07 NOTE — Clinical Note
Dopamine at 15mcg/kg; Epinephrine drip maxed out at 2 mcg/kg. Pt also on Vasopressin at 0.04 units/min. See charting for Levophed drip.

## 2022-11-07 NOTE — FIRST PROVIDER EVALUATION
Medical screening examination initiated.  I have conducted a focused provider triage encounter, findings are as follows:    Brief history of present illness:  67-year-old male presents emergency room with reports of shortness of breath, chest pressure in addition to back pain that began on Friday.  Patient reports that it is intermittent in nature however states been more constant over the past day.  He reports that while walking in from the parking lot he started having pain more to the left aspect of his arm into the neck and upper back.  He does report that he has been taking care of his son in which she had the him past few days therefore attributes to his back discomfort to this.  He denies any coughing.  He denies hemoptysis.  Patient's sats are noted to be 90% in which the patient states he typically runs around 93%.  He reports that he has a history of pneumonia.  He denies fever.  See physical examination.    There were no vitals filed for this visit.    Pertinent physical exam:  There is tenderness with palpation to the left upper portion of his scapula.  There is no rash or crepitus noted.  The patient does appear to be retracting with sats of 90% noted.  Breath sounds are clear throughout.    Brief workup plan:  Cardiac workup, chest x-ray.    Preliminary workup initiated; this workup will be continued and followed by the physician or advanced practice provider that is assigned to the patient when roomed.

## 2022-11-07 NOTE — ED PROVIDER NOTES
Encounter Date: 11/7/2022    SCRIBE #1 NOTE: I, Afshin Cuadra, am scribing for, and in the presence of,  Colette Sharp MD. I have scribed the following portions of the note - Other sections scribed: HPI, ROS, Physical Exam.     History     Chief Complaint   Patient presents with    Chest Pain    Shortness of Breath    Nausea     Patient arrives for evaluation of left sided chest pain and back pain with SOB x 2-3 days - patient has had intermittent chills with minimal pedal edema - increased thirst x 2 weeks with increased urination - intermittent diaphoresis     Jose Luis Rendon is a 67 y.o. male who presents to the ED for evaluation of intermittent chest pain and worsening shortness of breath. Patient reports SOB, neck pain, dizziness, fatigue, and nausea that started Friday afternoon while making a phone call. He reports left sided chest pain that started the next day and now radiates to the right side of the chest. He also reports back pain that started on Sunday. SOB worsens with exertion. He denies cough or fever. He denies history of CHF or diabetes.    The history is provided by the patient. No  was used.   Review of patient's allergies indicates:   Allergen Reactions    Bee sting [allergen ext-venom-honey bee] Anaphylaxis and Swelling    Venom-yellow jacket Swelling     Past Medical History:   Diagnosis Date    Anxiety 8/5/2016    Bell's palsy     Benign essential HTN 4/27/2018    Lacunar infarction     Remote finding seen on cranial imaging during acute workup for Bell's palsy/[place]    Pneumonia     age 5    Psoriasis      Past Surgical History:   Procedure Laterality Date    BACK SURGERY N/A 1998    bulging L5    TONSILLECTOMY       Family History   Problem Relation Age of Onset    Lung disease Mother     Glaucoma Mother     Cataracts Mother     Bladder Cancer Father     Alzheimer's disease Father     Glaucoma Father     Prostate cancer Father     Rheum arthritis Sister     Lupus  Sister     Coronary artery disease Maternal Grandmother         nonpremature    Colon cancer Neg Hx      Social History     Tobacco Use    Smoking status: Former     Packs/day: 1.00     Years: 45.00     Pack years: 45.00     Types: Cigarettes     Quit date: 2015     Years since quittin.8   Substance Use Topics    Alcohol use: Yes     Alcohol/week: 0.0 standard drinks     Comment: rare    Drug use: No     Review of Systems   Constitutional:  Positive for fatigue. Negative for chills and fever.   HENT:  Negative for sore throat.    Eyes:  Negative for redness.   Respiratory:  Positive for shortness of breath.    Cardiovascular:  Positive for chest pain.   Gastrointestinal:  Positive for nausea. Negative for abdominal pain, diarrhea and vomiting.   Genitourinary:  Negative for dysuria and hematuria.   Musculoskeletal:  Negative for back pain.   Skin:  Negative for rash.   Neurological:  Positive for dizziness. Negative for headaches.     Physical Exam     Initial Vitals [22 0956]   BP Pulse Resp Temp SpO2   136/69 108 (!) 28 97.7 °F (36.5 °C) (!) 90 %      MAP       --         Physical Exam    Nursing note and vitals reviewed.  Constitutional: He appears well-developed and well-nourished.   HENT:   Head: Normocephalic and atraumatic.   Eyes: EOM are normal. Pupils are equal, round, and reactive to light.   Neck: Neck supple. No JVD present.   Normal range of motion.  Cardiovascular:  Normal rate, regular rhythm, normal heart sounds and intact distal pulses.           Pulmonary/Chest: Tachypnea noted. He has rales.   Left basilar rales.  Pt appears short of breath.   Abdominal: Abdomen is soft. Bowel sounds are normal. He exhibits no distension. There is no abdominal tenderness. There is no rebound and no guarding.   Musculoskeletal:         General: Normal range of motion.      Cervical back: Normal range of motion and neck supple.      Right lower leg: Pitting Edema present.      Left lower leg:  Pitting Edema present.      Comments: <1 pitting edema to bilateral lower extremities.     Neurological: He is alert and oriented to person, place, and time.   Skin: Skin is warm and dry.   Psychiatric: He has a normal mood and affect. His behavior is normal. Judgment and thought content normal.       ED Course   Critical Care    Date/Time: 11/7/2022 11:59 AM  Performed by: Colette Sharp MD  Authorized by: Colette Sharp MD   Total critical care time (exclusive of procedural time) : 35 minutes  Critical care time was exclusive of separately billable procedures and treating other patients.  Critical care was necessary to treat or prevent imminent or life-threatening deterioration of the following conditions: cardiac failure and respiratory failure.  Critical care was time spent personally by me on the following activities: development of treatment plan with patient or surrogate, discussions with consultants, evaluation of patient's response to treatment, examination of patient, obtaining history from patient or surrogate, ordering and performing treatments and interventions, ordering and review of laboratory studies, ordering and review of radiographic studies, pulse oximetry, re-evaluation of patient's condition and review of old charts.      Labs Reviewed   URINALYSIS, REFLEX TO URINE CULTURE - Abnormal; Notable for the following components:       Result Value    Color, UA Colorless (*)     Specific Gravity, UA >1.030 (*)     Protein, UA 1+ (*)     Glucose, UA 4+ (*)     Ketones, UA 2+ (*)     Occult Blood UA 1+ (*)     All other components within normal limits    Narrative:     Specimen Source->Urine   BETA - HYDROXYBUTYRATE, SERUM - Abnormal; Notable for the following components:    Beta-Hydroxybutyrate 4.1 (*)     All other components within normal limits   COMPREHENSIVE METABOLIC PANEL - Abnormal; Notable for the following components:    Sodium 131 (*)     CO2 12 (*)     Glucose 586 (*)     BUN 28 (*)      Albumin 3.2 (*)     Total Bilirubin 1.6 (*)     ALT 47 (*)     Anion Gap 22 (*)     eGFR 54 (*)     All other components within normal limits    Narrative:       GLU critical result(s) called and verbal readback obtained from   Park Robison RN by BM6 11/07/2022 11:45   POCT GLUCOSE - Abnormal; Notable for the following components:    POCT Glucose >500 (*)     All other components within normal limits   TROPONIN I   B-TYPE NATRIURETIC PEPTIDE   URINALYSIS MICROSCOPIC    Narrative:     Specimen Source->Urine   CBC W/ AUTO DIFFERENTIAL   B-TYPE NATRIURETIC PEPTIDE   COMPREHENSIVE METABOLIC PANEL   TROPONIN I   BETA - HYDROXYBUTYRATE, SERUM   SARS-COV-2 RDRP GENE   POCT INFLUENZA A/B MOLECULAR   POCT GLUCOSE MONITORING CONTINUOUS     EKG Readings: (Independently Interpreted)   Initial: 1005. Rhythm: Sinus Tachycardia. Heart Rate: 101. Ectopy: No Ectopy. Conduction: RBBB. Q Waves: III, AVF and V3.     Imaging Results              X-Ray Chest 1 View (Final result)  Result time 11/07/22 11:09:27      Final result by Wade Heredia MD (11/07/22 11:09:27)                   Impression:      Subtle bibasilar interstitial opacities.  No focal consolidation.      Electronically signed by: Wade Heredia MD  Date:    11/07/2022  Time:    11:09               Narrative:    EXAMINATION:  XR CHEST 1 VIEW    CLINICAL HISTORY:  Shortness of breath    TECHNIQUE:  Single frontal view of the chest was performed.    COMPARISON:  09/18/2017.    FINDINGS:  Monitoring EKG leads are present.  The trachea is unremarkable.  There are calcifications of the aortic knob.  The cardiomediastinal silhouette is within normal limits.  There is no evidence of free air beneath the hemidiaphragms.  There are no pleural effusions.  There is no evidence of a pneumothorax.  There is no evidence of pneumomediastinum.  There are subtle interstitial opacities with basilar predominance.  There are degenerative changes in the osseous structures.                                        Medications   lactated ringers bolus 1,000 mL (1,000 mLs Intravenous New Bag 11/7/22 1207)   insulin regular injection 8 Units 0.08 mL (8 Units Intravenous Given 11/7/22 1206)              Scribe Attestation:   Scribe #1: I performed the above scribed service and the documentation accurately describes the services I performed. I attest to the accuracy of the note.      ED Course as of 11/07/22 1220   Mon Nov 07, 2022   1145 Glucose 586. CXR does not show CHF, BNP is 68. Will give fluids at this time and insulin. [ST]   1218 The case was discussed with Dr. Mcneal.  The patient will be admitted to the [facility] hospitalist service. [ST]      ED Course User Index  [ST] Colette Sharp MD                 Clinical Impression:   Final diagnoses:  [R07.9] Chest pain  [R06.02] Shortness of breath  [R09.02] Hypoxia (Primary)  [E08.10] Diabetic ketoacidosis without coma associated with diabetes mellitus due to underlying condition        ED Disposition Condition    Admit Stable           I, Colette Sharp, personally performed the services described in this documentation. All medical record entries made by the scribe were at my direction and in my presence.  I have reviewed the chart and agree that the record reflects my personal performance and is accurate and complete. Colette Sharp M.D. 12:20 PM11/07/2022      Colette Sharp MD  11/07/22 1220       Colette Sharp MD  11/07/22 1320       Colette Sharp MD  12/25/22 1004

## 2022-11-07 NOTE — SUBJECTIVE & OBJECTIVE
Past Medical History:   Diagnosis Date    Anxiety 2016    Bell's palsy     Benign essential HTN 2018    Lacunar infarction     Remote finding seen on cranial imaging during acute workup for Bell's palsy/[place] [facility]    Pneumonia     age 5    Psoriasis        Past Surgical History:   Procedure Laterality Date    BACK SURGERY N/A     bulging L5    TONSILLECTOMY         Review of patient's allergies indicates:   Allergen Reactions    Bee sting [allergen ext-venom-honey bee] Anaphylaxis and Swelling    Venom-yellow jacket Swelling       Family History       Problem Relation (Age of Onset)    Alzheimer's disease Father    Bladder Cancer Father    Cataracts Mother    Coronary artery disease Maternal Grandmother    Glaucoma Mother, Father    Lung disease Mother    Lupus Sister    Prostate cancer Father    Rheum arthritis Sister          Tobacco Use    Smoking status: Former     Packs/day: 1.00     Years: 45.00     Pack years: 45.00     Types: Cigarettes     Quit date: 2015     Years since quittin.8    Smokeless tobacco: Not on file   Substance and Sexual Activity    Alcohol use: Yes     Alcohol/week: 0.0 standard drinks     Comment: rare    Drug use: No    Sexual activity: Yes     Partners: Female      Review of Systems As above  Objective:     Vital Signs (Most Recent):  Temp: 98.9 °F (37.2 °C) (22)  Pulse: 104 (22 170)  Resp: (!) 28 (22 1555)  BP: 109/68 (22)  SpO2: (!) 87 % (22)   Vital Signs (24h Range):  Temp:  [97.7 °F (36.5 °C)-99.2 °F (37.3 °C)] 98.9 °F (37.2 °C)  Pulse:  [] 104  Resp:  [20-40] 28  SpO2:  [87 %-95 %] 87 %  BP: (106-143)/(63-69) 109/68   Weight: 88.5 kg (195 lb)  Body mass index is 27.98 kg/m².      Intake/Output Summary (Last 24 hours) at 2022 1739  Last data filed at 2022 1612  Gross per 24 hour   Intake 925 ml   Output 500 ml   Net 425 ml       Physical Exam  Constitutional:       General: He is not in acute  distress.     Appearance: Normal appearance. He is obese. He is not ill-appearing.   HENT:      Mouth/Throat:      Mouth: Mucous membranes are moist.   Cardiovascular:      Rate and Rhythm: Normal rate and regular rhythm.      Heart sounds: No murmur heard.  Pulmonary:      Effort: Pulmonary effort is normal.      Breath sounds: Rales present. No wheezing.   Abdominal:      Palpations: Abdomen is soft.      Tenderness: There is no abdominal tenderness.   Musculoskeletal:      Right lower leg: Edema present.      Left lower leg: Edema present.   Neurological:      Mental Status: He is alert.   Psychiatric:         Mood and Affect: Mood normal.       Vents:  Oxygen Concentration (%): 4 (11/07/22 1127)  Lines/Drains/Airways       Peripheral Intravenous Line  Duration                  Peripheral IV - Single Lumen 11/07/22 1125 20 G Anterior;Left;Proximal Upper Arm <1 day         Peripheral IV - Single Lumen 11/07/22 1335 20 G Right Antecubital <1 day                  Significant Labs:    CBC/Anemia Profile:  Recent Labs   Lab 11/07/22  1232 11/07/22  1238   WBC  --  12.19   HGB  --  16.2   HCT 48 49.7   PLT  --  163   MCV  --  94   RDW  --  12.4        Chemistries:  Recent Labs   Lab 11/07/22  1118 11/07/22  1239 11/07/22  1652   * 133* 132*   K 4.3 4.1 3.8   CL 97 96 98   CO2 12* 16* 17*   BUN 28* 26* 29*   CREATININE 1.4 1.5* 1.4   CALCIUM 9.4 9.5 8.7   ALBUMIN 3.2*  --   --    PROT 7.4  --   --    BILITOT 1.6*  --   --    ALKPHOS 97  --   --    ALT 47*  --   --    AST 27  --   --        All pertinent labs within the past 24 hours have been reviewed.    Significant Imaging: I have reviewed all pertinent imaging results/findings within the past 24 hours.  I have reviewed and interpreted all pertinent imaging results/findings within the past 24 hours.

## 2022-11-08 NOTE — CONSULTS
[facility] - Intensive Care  Critical Care Medicine  Consult Note    Patient Name: Jose Luis Rendon  MRN: [id number]  Admission Date: 11/7/2022  Hospital Length of Stay: 0 days  Code Status: Full Code  Attending Physician: Martin Grady MD   Primary Care Provider: Deric Armstrong MD   Principal Problem: <principal problem not specified>    Consults: Hypoxemia and DKA  Subjective:     HPI:  67 year old male with history of HTN, lacunar infarct, anxiety and Bell's Palsy that presented to the ED for the evaluation fo 3 days worsening exertional SOB, 1 pillow orthopnea and left sided shoulder pain that radiates to back and chest pain. Patient discloses lower extremities edema but no cough or fever. Patient discloses no history of heart disease or lung disease.   Patient has significant past history of smoking (45 year pack history; stopped in 2015).  On presentation, patient was found to have mild DKA and started on IVF and insulin drip.   MICU was called for desaturation. Examination remarkable for bi-basilar crackles and lower extremities pitting edema.   CXR showed increased interstitial markings  Troponin is negative. EKG showed RBBB (old) and T wave abnormality in the inferior leads (new)  Review of old imagings, CT scan of 10/28/2021 showed centrilobular emphysema with an upper lobe predominance and multiple bilateral solid pulmonary nodules.  Bed side Cardiac and lung US showed bilateral b lines (right > left) with hyper-dynamic heart  IVF was discontinued and one dose of IV lasix ordered      Past Medical History:   Diagnosis Date    Anxiety 8/5/2016    Bell's palsy     Benign essential HTN 4/27/2018    Lacunar infarction     Remote finding seen on cranial imaging during acute workup for Bell's palsy/[place]    Pneumonia     age 5    Psoriasis        Past Surgical History:   Procedure Laterality Date    BACK SURGERY N/A 1998    bulging L5    TONSILLECTOMY         Review of patient's allergies indicates:   Allergen  Reactions    Bee sting [allergen ext-venom-honey bee] Anaphylaxis and Swelling    Venom-yellow jacket Swelling       Family History       Problem Relation (Age of Onset)    Alzheimer's disease Father    Bladder Cancer Father    Cataracts Mother    Coronary artery disease Maternal Grandmother    Glaucoma Mother, Father    Lung disease Mother    Lupus Sister    Prostate cancer Father    Rheum arthritis Sister          Tobacco Use    Smoking status: Former     Packs/day: 1.00     Years: 45.00     Pack years: 45.00     Types: Cigarettes     Quit date: 2015     Years since quittin.8    Smokeless tobacco: Not on file   Substance and Sexual Activity    Alcohol use: Yes     Alcohol/week: 0.0 standard drinks     Comment: rare    Drug use: No    Sexual activity: Yes     Partners: Female      Review of Systems As above  Objective:     Vital Signs (Most Recent):  Temp: 98.9 °F (37.2 °C) (22 1735)  Pulse: 104 (22 170)  Resp: (!) 28 (22 1555)  BP: 109/68 (22)  SpO2: (!) 87 % (22)   Vital Signs (24h Range):  Temp:  [97.7 °F (36.5 °C)-99.2 °F (37.3 °C)] 98.9 °F (37.2 °C)  Pulse:  [] 104  Resp:  [20-40] 28  SpO2:  [87 %-95 %] 87 %  BP: (106-143)/(63-69) 109/68   Weight: 88.5 kg (195 lb)  Body mass index is 27.98 kg/m².      Intake/Output Summary (Last 24 hours) at 2022 1739  Last data filed at 2022 1612  Gross per 24 hour   Intake 925 ml   Output 500 ml   Net 425 ml       Physical Exam  Constitutional:       General: He is not in acute distress.     Appearance: Normal appearance. He is obese. He is not ill-appearing.   HENT:      Mouth/Throat:      Mouth: Mucous membranes are moist.   Cardiovascular:      Rate and Rhythm: Normal rate and regular rhythm.      Heart sounds: No murmur heard.  Pulmonary:      Effort: Pulmonary effort is normal.      Breath sounds: Rales present. No wheezing.   Abdominal:      Palpations: Abdomen is soft.      Tenderness: There is no  abdominal tenderness.   Musculoskeletal:      Right lower leg: Edema present.      Left lower leg: Edema present.   Neurological:      Mental Status: He is alert.   Psychiatric:         Mood and Affect: Mood normal.       Vents:  Oxygen Concentration (%): 4 (11/07/22 1127)  Lines/Drains/Airways       Peripheral Intravenous Line  Duration                  Peripheral IV - Single Lumen 11/07/22 1125 20 G Anterior;Left;Proximal Upper Arm <1 day         Peripheral IV - Single Lumen 11/07/22 1335 20 G Right Antecubital <1 day                  Significant Labs:    CBC/Anemia Profile:  Recent Labs   Lab 11/07/22  1232 11/07/22  1238   WBC  --  12.19   HGB  --  16.2   HCT 48 49.7   PLT  --  163   MCV  --  94   RDW  --  12.4        Chemistries:  Recent Labs   Lab 11/07/22  1118 11/07/22  1239 11/07/22  1652   * 133* 132*   K 4.3 4.1 3.8   CL 97 96 98   CO2 12* 16* 17*   BUN 28* 26* 29*   CREATININE 1.4 1.5* 1.4   CALCIUM 9.4 9.5 8.7   ALBUMIN 3.2*  --   --    PROT 7.4  --   --    BILITOT 1.6*  --   --    ALKPHOS 97  --   --    ALT 47*  --   --    AST 27  --   --        All pertinent labs within the past 24 hours have been reviewed.    Significant Imaging: I have reviewed all pertinent imaging results/findings within the past 24 hours.  I have reviewed and interpreted all pertinent imaging results/findings within the past 24 hours.      ABG  Recent Labs   Lab 11/07/22  1232   PH 7.360   PO2 71*   PCO2 31.4*   HCO3 17.7*   BE -8     Assessment/Plan:     Pulmonary  Acute respiratory failure with hypoxemia  Acute hypoxemic respiratory failure due to pulmonary edema from suspected HFpEF (orthopnea, dyspnea, lower extremities edema, bi-basal crepitus and B lines on US) vs COPD exacerbation (significant history of smoking and emphysematous changes on CT scan)    Recommendations:  Hold IVF  Lasix 40 mg IV x 1  Continue DuoNebs  Prednisone 40 mg oral daily x 5 days  Azithromycin 500 mg daily  Continue pulse oximetry  BiPAP as  needed for increase work of breathing    Cardiac/Vascular  Left-sided chest pain  Unclear etiology, suspected MSK as patient has chest tenderness on exam  EKG showed RBBB (old) and T wave abnormality in the inferior leads (new) but troponin x 2 negative  Check one more troponin  Continue aspirin and statin    Benign essential HTN  Continue home dose of amlodipine  Monitor blood pressure    Endocrine  DKA (diabetic ketoacidosis)  Mild DKA  New diagnosis of Diabetes Mellitus to patient, of note, HBA1c last year was 7.3, now > 14  Continue insulin drip as par protocol until AG closes   Detemir 10 units SQ daily  Serial BMP and phosphorus     Formal ECHO   Critical Care Daily Checklist:    A: Awake: RASS Goal/Actual Goal:  NA  Actual:     B: Spontaneous Breathing Trial Performed?  NA   C: SAT & SBT Coordinated?  NA                   D: Delirium: CAM-ICU     E: Early Mobility Performed? Yes   F: Feeding Goal:    Status:     Current Diet Order   Procedures    Diet NPO      AS: Analgesia/Sedation NA   T: Thromboembolic Prophylaxis Enoxaparin   H: HOB > 300 Yes   U: Stress Ulcer Prophylaxis (if needed) NA   G: Glucose Control On insulin drip   B: Bowel Function     I: Indwelling Catheter (Lines & Tran) Necessity NA   D: De-escalation of Antimicrobials/Pharmacotherapies Azithromycin indicated for COPD exacerbation    Plan for the day/ETD Admit to ICU    Code Status:  Family/Goals of Care: Full Code         Critical secondary to Patient is currently on drug therapy requiring intensive monitoring for toxicity: Insulin drip and BiPAP     Critical care was time spent personally by me on the following activities: development of treatment plan with patient or surrogate and bedside caregivers, discussions with consultants, evaluation of patient's response to treatment, examination of patient, ordering and performing treatments and interventions, ordering and review of laboratory studies, ordering and review of radiographic  studies, pulse oximetry, re-evaluation of patient's condition. This critical care time did not overlap with that of any other provider or involve time for any procedures.    Thank you for your consult.      Naty Cazares MD  [employer] PCCM Fellow, [facility] - Intensive Care    Pt seen and examined with Pulmonary/Critical Care team. Critical Care time was spent validating the history and physical exam, reviewing the lab and imaging results, and discussing the care of the patient with the bedside nurse. The following additional comments are made:    Although pt has a few dependent B-lines on POCUS and a little bit of dependent edema, his SOB seems out of proportion. LVEF normal.  RV looks OK. I worry more that he is having a COPD exacerbation, His prior CT shows extensive emphysema. He is purse-lipped and has active expiration. His metabolic acidosis is likely contibuting to his dyspnea.  A viral exacerbation, such as flu or COVID or RSV could cause DKA and AECOPD. Steroids added. NIV tonight.    Critical Care time 45 minutes    Darrius Felton MD  Phone [phone]

## 2022-11-08 NOTE — SUBJECTIVE & OBJECTIVE
Past Medical History:   Diagnosis Date    Anxiety 8/5/2016    Bell's palsy     Benign essential HTN 4/27/2018    Lacunar infarction     Remote finding seen on cranial imaging during acute workup for Bell's palsy/[place]    Pneumonia     age 5    Psoriasis        Past Surgical History:   Procedure Laterality Date    BACK SURGERY N/A 1998    bulging L5    TONSILLECTOMY         Review of patient's allergies indicates:   Allergen Reactions    Bee sting [allergen ext-venom-honey bee] Anaphylaxis and Swelling    Venom-yellow jacket Swelling       No current facility-administered medications on file prior to encounter.     Current Outpatient Medications on File Prior to Encounter   Medication Sig    amLODIPine (NORVASC) 5 MG tablet Take 1 tablet (5 mg total) by mouth once daily.    aspirin (ECOTRIN) 81 MG EC tablet Take 81 mg by mouth.    atorvastatin (LIPITOR) 40 MG tablet TAKE 1 TABLET(40 MG) BY MOUTH EVERY DAY (Patient taking differently: Take 40 mg by mouth once daily.)    EScitalopram oxalate (LEXAPRO) 10 MG tablet TAKE 1 TABLET(10 MG) BY MOUTH EVERY DAY (Patient taking differently: Take 10 mg by mouth once daily.)    [DISCONTINUED] aspirin (ECOTRIN) 81 MG EC tablet Take 1 tablet (81 mg total) by mouth once daily.    [DISCONTINUED] bacitracin (AK-TRACIN) ophthalmic ointment     [DISCONTINUED] cephALEXin (KEFLEX) 500 MG capsule Take 1 capsule (500 mg total) by mouth 2 (two) times a day.    [DISCONTINUED] clobetasol (TEMOVATE) 0.05 % cream Apply topically 2 (two) times daily as needed. To inflamed areas of elbows (Patient not taking: Reported on 10/28/2021)     Family History       Problem Relation (Age of Onset)    Alzheimer's disease Father    Bladder Cancer Father    Cataracts Mother    Coronary artery disease Maternal Grandmother    Glaucoma Mother, Father    Lung disease Mother    Lupus Sister    Prostate cancer Father    Rheum arthritis Sister          Tobacco Use    Smoking status: Former     Packs/day: 1.00      Years: 45.00     Pack years: 45.00     Types: Cigarettes     Quit date: 2015     Years since quittin.8    Smokeless tobacco: Not on file   Substance and Sexual Activity    Alcohol use: Yes     Alcohol/week: 0.0 standard drinks     Comment: rare    Drug use: No    Sexual activity: Yes     Partners: Female     Review of Systems   Constitutional:  Negative for activity change, appetite change, chills, diaphoresis, fatigue, fever and unexpected weight change.   Respiratory:  Positive for shortness of breath. Negative for cough, chest tightness, wheezing and stridor.    Cardiovascular:  Positive for chest pain and leg swelling. Negative for palpitations.   Gastrointestinal:  Positive for nausea and vomiting. Negative for abdominal pain, blood in stool, constipation and diarrhea.   Endocrine: Negative for cold intolerance and heat intolerance.   Genitourinary:  Positive for frequency. Negative for difficulty urinating, dysuria and flank pain.   Musculoskeletal:  Negative for arthralgias, joint swelling and myalgias.   Skin: Negative.    Neurological:  Positive for facial asymmetry. Negative for dizziness, weakness, light-headedness and headaches.   Objective:     Vital Signs (Most Recent):  Temp: 98.9 °F (37.2 °C) (22 1735)  Pulse: 102 (22)  Resp: (!) 21 (22)  BP: 109/63 (22)  SpO2: (!) 93 % (22)   Vital Signs (24h Range):  Temp:  [97.7 °F (36.5 °C)-99.2 °F (37.3 °C)] 98.9 °F (37.2 °C)  Pulse:  [] 102  Resp:  [18-40] 21  SpO2:  [87 %-95 %] 93 %  BP: (106-156)/(62-99) 109/63     Weight: 88 kg (194 lb 0.1 oz)  Body mass index is 27.84 kg/m².    Physical Exam  Constitutional:       Appearance: He is not ill-appearing or toxic-appearing.   HENT:      Head: Normocephalic and atraumatic.      Mouth/Throat:      Mouth: Mucous membranes are moist.   Eyes:      Conjunctiva/sclera: Conjunctivae normal.      Pupils: Pupils are equal, round, and reactive to light.    Cardiovascular:      Rate and Rhythm: Normal rate and regular rhythm.      Heart sounds: Normal heart sounds.   Pulmonary:      Effort: Pulmonary effort is normal.      Breath sounds: Rales (bibasilar) present.   Abdominal:      General: Bowel sounds are normal. There is no distension.      Palpations: Abdomen is soft.      Tenderness: There is no abdominal tenderness. There is no guarding.   Musculoskeletal:         General: Normal range of motion.      Cervical back: Normal range of motion and neck supple.      Right lower leg: Edema present.      Left lower leg: Edema present.   Skin:     General: Skin is warm and dry.   Neurological:      Mental Status: He is alert and oriented to person, place, and time. Mental status is at baseline.      Sensory: No sensory deficit.      Comments: Facial droop   Psychiatric:         Mood and Affect: Mood normal.       CRANIAL NERVES     CN III, IV, VI   Pupils are equal, round, and reactive to light.     Significant Labs: All pertinent labs within the past 24 hours have been reviewed.    Significant Imaging: I have reviewed all pertinent imaging results/findings within the past 24 hours.

## 2022-11-08 NOTE — ASSESSMENT & PLAN NOTE
Workup in ED remarkable for hyperglycemia, HAGMA, elevated beta hydroxy.   New diagnosis of DM  DKA protocol  Insulin drip now transition to subcutaneous insulin   -A1c greater than 14; will need insulin at time of discharge

## 2022-11-08 NOTE — ASSESSMENT & PLAN NOTE
Likely 2/2 COPD vs CHF  CXR showing subtle bibasilar interstitial opacities.  No focal consolidation  CT chest 10/2021: Centrilobular emphysema with an upper lobe predominance.  Bibasilar crackles on exam  Standing duonebs  Started on azithromycin and prednisone  Supp O2 PRN to keep O2 sat 88-92%  TTE  Strict I&O  Hold IV fluids

## 2022-11-08 NOTE — PROGRESS NOTES
[facility] Medicine  Progress Note    Patient Name: Jose Luis Rendon  MRN: [id number]  Patient Class: IP- Inpatient   Admission Date: 11/7/2022  Length of Stay: 1 days  Attending Physician: Jermain Jackson, *  Primary Care Provider: Deric Armstrong MD        Subjective:     Principal Problem:DKA (diabetic ketoacidosis)        HPI:  67M with PMH of COPD, HTN, CVA, Bell's Palsy presents with c/o sob, chest pain, nausea, vomiting x 4 days. Pt first noticed chest pain after picking up a family member off the floor. He also has had multiple episodes of N/V with retching which has exacerbated symptoms. The pain radiates around right side to his back. Also has SOB which pt states is chronic but has progressively worsened recently. Denies fever, chills, palpitations, diaphoresis, abd pain, diarrhea, dysuria. No recent fall/trauma. Workup in ED remarkable for hyperglycemia, HAGMA, elevated beta hydroxy. CXR showing Subtle bibasilar interstitial opacities, no focal consolidation. No known history of CHF or DM. Patient will be admitted to hospital medicine service for further management.      Overview/Hospital Course:  No notes on file    Interval History:  Still with significant shortness of breath today, although anion gap has closed we are weaning off insulin drip.  Suspect patient viral respiratory illness which led to decompensation.  Was requiring BiPAP this morning but able to wean this afternoon.  Remains stable can step-down to the floor.    Review of Systems   Constitutional:  Positive for chills. Negative for fever.   Respiratory:  Positive for shortness of breath and wheezing.    Cardiovascular:  Negative for chest pain and leg swelling.   Objective:     Vital Signs (Most Recent):  Temp: 98.3 °F (36.8 °C) (11/08/22 1101)  Pulse: 95 (11/08/22 1400)  Resp: (!) 22 (11/08/22 1400)  BP: 113/63 (11/08/22 1400)  SpO2: (!) 94 % (11/08/22 1400)   Vital Signs (24h Range):  Temp:  [96.1 °F (35.6  °C)-99.8 °F (37.7 °C)] 98.3 °F (36.8 °C)  Pulse:  [] 95  Resp:  [11-37] 22  SpO2:  [87 %-100 %] 94 %  BP: ()/(52-99) 113/63     Weight: 90.3 kg (199 lb)  Body mass index is 28.55 kg/m².    Intake/Output Summary (Last 24 hours) at 11/8/2022 1426  Last data filed at 11/8/2022 1200  Gross per 24 hour   Intake 318.19 ml   Output 525 ml   Net -206.81 ml      Physical Exam  Constitutional:       Appearance: He is not ill-appearing or toxic-appearing.   HENT:      Head: Normocephalic and atraumatic.      Mouth/Throat:      Mouth: Mucous membranes are moist.   Eyes:      Conjunctiva/sclera: Conjunctivae normal.      Pupils: Pupils are equal, round, and reactive to light.   Cardiovascular:      Rate and Rhythm: Normal rate and regular rhythm.      Heart sounds: Normal heart sounds.   Pulmonary:      Effort: Pulmonary effort is normal.      Breath sounds: Rales (bibasilar) present.      Comments: On BiPAP  Abdominal:      General: Bowel sounds are normal. There is no distension.      Palpations: Abdomen is soft.      Tenderness: There is no abdominal tenderness. There is no guarding.   Musculoskeletal:         General: Normal range of motion.      Cervical back: Normal range of motion and neck supple.      Right lower leg: Edema present.      Left lower leg: Edema present.   Skin:     General: Skin is warm and dry.   Neurological:      Mental Status: He is alert and oriented to person, place, and time. Mental status is at baseline.      Sensory: No sensory deficit.      Comments: Facial droop   Psychiatric:         Mood and Affect: Mood normal.       Significant Labs: All pertinent labs within the past 24 hours have been reviewed.    Significant Imaging: I have reviewed all pertinent imaging results/findings within the past 24 hours.      Assessment/Plan:      * DKA (diabetic ketoacidosis)  Workup in ED remarkable for hyperglycemia, HAGMA, elevated beta hydroxy.   New diagnosis of DM  DKA protocol  Insulin drip  now transition to subcutaneous insulin   -A1c greater than 14; will need insulin at time of discharge    Centrilobular emphysema  -noted on imaging  -needs PFTs as outpatient and pulmonology follow-up      COPD with acute exacerbation  As above-nightly BiPAP for in addition to prednisone, azithromycin    Left-sided chest pain  Pain occurred after picking up family member off floor and after N/V with dry heaving  EKG showing nonspecific T wave changes  Trops neg x2  Likely MSK  Telemetry         Acute hypoxemic respiratory failure  Likely 2/2 COPD  CXR showing subtle bibasilar interstitial opacities.  No focal consolidation  CT chest 10/2021: Centrilobular emphysema with an upper lobe predominance.  Bibasilar crackles on exam  Standing duonebs  Started on azithromycin and prednisone  Supp O2 PRN to keep O2 sat 88-92%  TTE  Strict I&O  Hold IV fluids    Benign essential HTN  Cont amlodipine      VTE Risk Mitigation (From admission, onward)         Ordered     enoxaparin injection 40 mg  Daily         11/07/22 1726     IP VTE LOW RISK PATIENT  Once         11/07/22 1231     Place sequential compression device  Until discontinued         11/07/22 1231                Discharge Planning   BELINDA: 11/10/2022     Code Status: Full Code   Is the patient medically ready for discharge?:     Reason for patient still in hospital (select all that apply): Patient trending condition and Treatment               Critical care time spent on the evaluation and treatment of severe organ dysfunction, review of pertinent labs and imaging studies, discussions with consulting providers and discussions with patient/family: 33 minutes.      Jermain Jackson MD  Department of Hospital Medicine   [facility] - Intensive Care

## 2022-11-08 NOTE — ASSESSMENT & PLAN NOTE
Unclear etiology, suspected MSK as patient has chest tenderness on exam  EKG showed RBBB (old) and T wave abnormality in the inferior leads (new) but troponin x 2 negative  Check one more troponin  Continue aspirin and statin

## 2022-11-08 NOTE — CONSULTS
Food & Nutrition  Education    Diet Education: diabetic  Time Spent: 15 minutes  Learners: pt      Nutrition Education provided with handouts:   Carbohydrate Counting for People with Diabetes    Comments:  Pt reports he was diagnosed with diabetes a few months ago. He is not on insulin. He does not have a glucometer and he has never been educated on the diet. Reviewed over carbohydrate foods and portion sizes. Educated on carb counting. Pt very sleepy at visit but seemed to understand. Handouts were provided.    All questions and concerns answered. Dietitian's contact information provided.       Follow-Up: 11/11/22    Please Re-consult as needed        Thanks!

## 2022-11-08 NOTE — ASSESSMENT & PLAN NOTE
Pain occurred after picking up family member off floor and after N/V with dry heaving  EKG showing nonspecific T wave changes  Trops neg x2  Likely MSK  Telemetry

## 2022-11-08 NOTE — ASSESSMENT & PLAN NOTE
Mild DKA  New diagnosis of Diabetes Mellitus to patient, of note, HBA1c last year was 7.3, now > 14  Continue insulin drip as par protocol until AG closes   Detemir 10 units SQ daily  Serial BMP and phosphorus

## 2022-11-08 NOTE — HPI
67M with PMH of COPD, HTN, CVA, Bell's Palsy presents with c/o sob, chest pain, nausea, vomiting x 4 days. Pt first noticed chest pain after picking up a family member off the floor. He also has had multiple episodes of N/V with retching which has exacerbated symptoms. The pain radiates around right side to his back. Also has SOB which pt states is chronic but has progressively worsened recently. Denies fever, chills, palpitations, diaphoresis, abd pain, diarrhea, dysuria. No recent fall/trauma. Workup in ED remarkable for hyperglycemia, HAGMA, elevated beta hydroxy. CXR showing Subtle bibasilar interstitial opacities, no focal consolidation. No known history of CHF or DM. Patient will be admitted to hospital medicine service for further management.

## 2022-11-08 NOTE — ASSESSMENT & PLAN NOTE
Acute hypoxemic respiratory failure due to pulmonary edema from suspected HFpEF (orthopnea, dyspnea, lower extremities edema, bi-basal crepitus and B lines on US) vs COPD exacerbation (significant history of smoking and emphysematous changes on CT scan)    Recommendations:  Hold IVF  Lasix 40 mg IV x 1  Continue DuoNebs  Prednisone 40 mg oral daily x 5 days  Azithromycin 500 mg daily  Continue pulse oximetry  BiPAP as needed for increase work of breathing

## 2022-11-08 NOTE — ASSESSMENT & PLAN NOTE
Workup in ED remarkable for hyperglycemia, HAGMA, elevated beta hydroxy.   New diagnosis of DM  DKA protocol  Insulin drip  Hold fluids due to concern for CHF

## 2022-11-08 NOTE — PLAN OF CARE
Pt lives in [place] and is independent at home.  His stepson lives with him but is disabled and unable to assist pt.  Pts sees PCP Dr. Armstrong and he is able to drive.  Pt states he was compliant with home medications up until this last week.  White board updated with CM name and contact information.  Discharge brochure provided.  Pt encouraged to call with any questions or concerns.  Cm will continue to follow pt through transitions of care and assist with any discharge needs.       11/08/22 4987   Discharge Assessment   Assessment Type Discharge Planning Assessment   Confirmed/corrected address, phone number and insurance Yes   Confirmed Demographics Correct on Facesheet   Source of Information patient   Communicated BELINDA with patient/caregiver Yes   Lives With child(patric), adult   Facility Arrived From: home   Do you expect to return to your current living situation? Yes   Do you have help at home or someone to help you manage your care at home? No   Prior to hospitilization cognitive status: Alert/Oriented   Current cognitive status: Alert/Oriented   Walking or Climbing Stairs Difficulty none   Dressing/Bathing Difficulty none   Equipment Currently Used at Home none   Readmission within 30 days? No   Patient currently being followed by outpatient case management? No   Do you currently have service(s) that help you manage your care at home? No   Do you take prescription medications? Yes   Do you have prescription coverage? Yes   Do you have any problems affording any of your prescribed medications? No   Is the patient taking medications as prescribed? yes   How do you get to doctors appointments? car, drives self   Are you on dialysis? No   Do you take coumadin? No   Discharge Plan A Home   Discharge Plan B Home Health;Home with family   DME Needed Upon Discharge  none   Discharge Plan discussed with: Patient   Discharge Barriers Identified None     Matthew York RN   Supervisor Case Management-[place]  [phone]

## 2022-11-08 NOTE — ASSESSMENT & PLAN NOTE
Likely 2/2 COPD  CXR showing subtle bibasilar interstitial opacities.  No focal consolidation  CT chest 10/2021: Centrilobular emphysema with an upper lobe predominance.  Bibasilar crackles on exam  Standing duonebs  Started on azithromycin and prednisone  Supp O2 PRN to keep O2 sat 88-92%  TTE  Strict I&O  Hold IV fluids

## 2022-11-08 NOTE — SUBJECTIVE & OBJECTIVE
Interval History:  Still with significant shortness of breath today, although anion gap has closed we are weaning off insulin drip.  Suspect patient viral respiratory illness which led to decompensation.  Was requiring BiPAP this morning but able to wean this afternoon.  Remains stable can step-down to the floor.    Review of Systems   Constitutional:  Positive for chills. Negative for fever.   Respiratory:  Positive for shortness of breath and wheezing.    Cardiovascular:  Negative for chest pain and leg swelling.   Objective:     Vital Signs (Most Recent):  Temp: 98.3 °F (36.8 °C) (11/08/22 1101)  Pulse: 95 (11/08/22 1400)  Resp: (!) 22 (11/08/22 1400)  BP: 113/63 (11/08/22 1400)  SpO2: (!) 94 % (11/08/22 1400)   Vital Signs (24h Range):  Temp:  [96.1 °F (35.6 °C)-99.8 °F (37.7 °C)] 98.3 °F (36.8 °C)  Pulse:  [] 95  Resp:  [11-37] 22  SpO2:  [87 %-100 %] 94 %  BP: ()/(52-99) 113/63     Weight: 90.3 kg (199 lb)  Body mass index is 28.55 kg/m².    Intake/Output Summary (Last 24 hours) at 11/8/2022 1426  Last data filed at 11/8/2022 1200  Gross per 24 hour   Intake 318.19 ml   Output 525 ml   Net -206.81 ml      Physical Exam  Constitutional:       Appearance: He is not ill-appearing or toxic-appearing.   HENT:      Head: Normocephalic and atraumatic.      Mouth/Throat:      Mouth: Mucous membranes are moist.   Eyes:      Conjunctiva/sclera: Conjunctivae normal.      Pupils: Pupils are equal, round, and reactive to light.   Cardiovascular:      Rate and Rhythm: Normal rate and regular rhythm.      Heart sounds: Normal heart sounds.   Pulmonary:      Effort: Pulmonary effort is normal.      Breath sounds: Rales (bibasilar) present.      Comments: On BiPAP  Abdominal:      General: Bowel sounds are normal. There is no distension.      Palpations: Abdomen is soft.      Tenderness: There is no abdominal tenderness. There is no guarding.   Musculoskeletal:         General: Normal range of motion.      Cervical  back: Normal range of motion and neck supple.      Right lower leg: Edema present.      Left lower leg: Edema present.   Skin:     General: Skin is warm and dry.   Neurological:      Mental Status: He is alert and oriented to person, place, and time. Mental status is at baseline.      Sensory: No sensory deficit.      Comments: Facial droop   Psychiatric:         Mood and Affect: Mood normal.       Significant Labs: All pertinent labs within the past 24 hours have been reviewed.    Significant Imaging: I have reviewed all pertinent imaging results/findings within the past 24 hours.

## 2022-11-08 NOTE — PLAN OF CARE
Patient resting in bed. Vitals stable. No acute events during shift. Some mild dyspnea with activity and orthopnea. Able to sit on edge of bed independently while maintaining O2 sats on 4 L NC. Off of insulin gtt and eating well. Awaiting transfer to floor.

## 2022-11-08 NOTE — NURSING
Nursing Transfer Note      11/7/2022   Received patient at 1715 from ED.    Reason patient is being transferred: Insulin gtt    Transfer From: ED    Transfer via stretcher    Transfer with portable O2 at 4L via NC and cardiac monitoring    Transported by KEYSHA Meyer RN    Medicines sent: Insulin gtt    Any special needs or follow-up needed: CBG checks every hour    Chart send with patient: No    Notified: ex wife notified by patient    Upon arrival to floor: cardiac monitor applied, patient oriented to room, call bell in reach, and bed in lowest position

## 2022-11-08 NOTE — H&P
[facility] Medicine  History & Physical    Patient Name: Jose Luis Rendon  MRN: [id number]  Patient Class: IP- Inpatient  Admission Date: 11/7/2022  Attending Physician: Martin Grady MD   Primary Care Provider: Deric Armstrong MD         Patient information was obtained from patient and ER records.     Subjective:     Principal Problem:DKA (diabetic ketoacidosis)    Chief Complaint:   Chief Complaint   Patient presents with    Chest Pain    Shortness of Breath    Nausea     Patient arrives for evaluation of left sided chest pain and back pain with SOB x 2-3 days - patient has had intermittent chills with minimal pedal edema - increased thirst x 2 weeks with increased urination - intermittent diaphoresis        HPI: 67M with PMH of COPD, HTN, CVA, Bell's Palsy presents with c/o sob, chest pain, nausea, vomiting x 4 days. Pt first noticed chest pain after picking up a family member off the floor. He also has had multiple episodes of N/V with retching which has exacerbated symptoms. The pain radiates around right side to his back. Also has SOB which pt states is chronic but has progressively worsened recently. Denies fever, chills, palpitations, diaphoresis, abd pain, diarrhea, dysuria. No recent fall/trauma. Workup in ED remarkable for hyperglycemia, HAGMA, elevated beta hydroxy. CXR showing Subtle bibasilar interstitial opacities, no focal consolidation. No known history of CHF or DM. Patient will be admitted to hospital medicine service for further management.      Past Medical History:   Diagnosis Date    Anxiety 8/5/2016    Bell's palsy     Benign essential HTN 4/27/2018    Lacunar infarction     Remote finding seen on cranial imaging during acute workup for Bell's palsy/[place]    Pneumonia     age 5    Psoriasis        Past Surgical History:   Procedure Laterality Date    BACK SURGERY N/A 1998    bulging L5    TONSILLECTOMY         Review of patient's allergies indicates:    Allergen Reactions    Bee sting [allergen ext-venom-honey bee] Anaphylaxis and Swelling    Venom-yellow jacket Swelling       No current facility-administered medications on file prior to encounter.     Current Outpatient Medications on File Prior to Encounter   Medication Sig    amLODIPine (NORVASC) 5 MG tablet Take 1 tablet (5 mg total) by mouth once daily.    aspirin (ECOTRIN) 81 MG EC tablet Take 81 mg by mouth.    atorvastatin (LIPITOR) 40 MG tablet TAKE 1 TABLET(40 MG) BY MOUTH EVERY DAY (Patient taking differently: Take 40 mg by mouth once daily.)    EScitalopram oxalate (LEXAPRO) 10 MG tablet TAKE 1 TABLET(10 MG) BY MOUTH EVERY DAY (Patient taking differently: Take 10 mg by mouth once daily.)    [DISCONTINUED] aspirin (ECOTRIN) 81 MG EC tablet Take 1 tablet (81 mg total) by mouth once daily.    [DISCONTINUED] bacitracin (AK-TRACIN) ophthalmic ointment     [DISCONTINUED] cephALEXin (KEFLEX) 500 MG capsule Take 1 capsule (500 mg total) by mouth 2 (two) times a day.    [DISCONTINUED] clobetasol (TEMOVATE) 0.05 % cream Apply topically 2 (two) times daily as needed. To inflamed areas of elbows (Patient not taking: Reported on 10/28/2021)     Family History       Problem Relation (Age of Onset)    Alzheimer's disease Father    Bladder Cancer Father    Cataracts Mother    Coronary artery disease Maternal Grandmother    Glaucoma Mother, Father    Lung disease Mother    Lupus Sister    Prostate cancer Father    Rheum arthritis Sister          Tobacco Use    Smoking status: Former     Packs/day: 1.00     Years: 45.00     Pack years: 45.00     Types: Cigarettes     Quit date: 2015     Years since quittin.8    Smokeless tobacco: Not on file   Substance and Sexual Activity    Alcohol use: Yes     Alcohol/week: 0.0 standard drinks     Comment: rare    Drug use: No    Sexual activity: Yes     Partners: Female     Review of Systems   Constitutional:  Negative for activity change, appetite change,  chills, diaphoresis, fatigue, fever and unexpected weight change.   Respiratory:  Positive for shortness of breath. Negative for cough, chest tightness, wheezing and stridor.    Cardiovascular:  Positive for chest pain and leg swelling. Negative for palpitations.   Gastrointestinal:  Positive for nausea and vomiting. Negative for abdominal pain, blood in stool, constipation and diarrhea.   Endocrine: Negative for cold intolerance and heat intolerance.   Genitourinary:  Positive for frequency. Negative for difficulty urinating, dysuria and flank pain.   Musculoskeletal:  Negative for arthralgias, joint swelling and myalgias.   Skin: Negative.    Neurological:  Positive for facial asymmetry. Negative for dizziness, weakness, light-headedness and headaches.   Objective:     Vital Signs (Most Recent):  Temp: 98.9 °F (37.2 °C) (11/07/22 1735)  Pulse: 102 (11/07/22 1930)  Resp: (!) 21 (11/07/22 1930)  BP: 109/63 (11/07/22 1930)  SpO2: (!) 93 % (11/07/22 1930)   Vital Signs (24h Range):  Temp:  [97.7 °F (36.5 °C)-99.2 °F (37.3 °C)] 98.9 °F (37.2 °C)  Pulse:  [] 102  Resp:  [18-40] 21  SpO2:  [87 %-95 %] 93 %  BP: (106-156)/(62-99) 109/63     Weight: 88 kg (194 lb 0.1 oz)  Body mass index is 27.84 kg/m².    Physical Exam  Constitutional:       Appearance: He is not ill-appearing or toxic-appearing.   HENT:      Head: Normocephalic and atraumatic.      Mouth/Throat:      Mouth: Mucous membranes are moist.   Eyes:      Conjunctiva/sclera: Conjunctivae normal.      Pupils: Pupils are equal, round, and reactive to light.   Cardiovascular:      Rate and Rhythm: Normal rate and regular rhythm.      Heart sounds: Normal heart sounds.   Pulmonary:      Effort: Pulmonary effort is normal.      Breath sounds: Rales (bibasilar) present.   Abdominal:      General: Bowel sounds are normal. There is no distension.      Palpations: Abdomen is soft.      Tenderness: There is no abdominal tenderness. There is no guarding.    Musculoskeletal:         General: Normal range of motion.      Cervical back: Normal range of motion and neck supple.      Right lower leg: Edema present.      Left lower leg: Edema present.   Skin:     General: Skin is warm and dry.   Neurological:      Mental Status: He is alert and oriented to person, place, and time. Mental status is at baseline.      Sensory: No sensory deficit.      Comments: Facial droop   Psychiatric:         Mood and Affect: Mood normal.       CRANIAL NERVES     CN III, IV, VI   Pupils are equal, round, and reactive to light.     Significant Labs: All pertinent labs within the past 24 hours have been reviewed.    Significant Imaging: I have reviewed all pertinent imaging results/findings within the past 24 hours.    Assessment/Plan:     * DKA (diabetic ketoacidosis)  Workup in ED remarkable for hyperglycemia, HAGMA, elevated beta hydroxy.   New diagnosis of DM  DKA protocol  Insulin drip  Hold fluids due to concern for CHF    Centrilobular emphysema        COPD with acute exacerbation  As above      Left-sided chest pain  Pain occurred after picking up family member off floor and after N/V with dry heaving  EKG showing nonspecific T wave changes  Trops neg x2  Likely MSK  Telemetry         Acute hypoxemic respiratory failure  Likely 2/2 COPD vs CHF  CXR showing subtle bibasilar interstitial opacities.  No focal consolidation  CT chest 10/2021: Centrilobular emphysema with an upper lobe predominance.  Bibasilar crackles on exam  Standing duonebs  Started on azithromycin and prednisone  Supp O2 PRN to keep O2 sat 88-92%  TTE  Strict I&O  Hold IV fluids    Benign essential HTN  Cont amlodipine      VTE Risk Mitigation (From admission, onward)         Ordered     enoxaparin injection 40 mg  Daily         11/07/22 1726     IP VTE LOW RISK PATIENT  Once         11/07/22 1231     Place sequential compression device  Until discontinued         11/07/22 1231              Critical care time spent  on the evaluation and treatment of severe organ dysfunction, review of pertinent labs and imaging studies, discussions with consulting providers and discussions with patient/family: 45 minutes.     Martin Grady MD  Department of Hospital Medicine   [facility] - Intensive Care

## 2022-11-08 NOTE — PLAN OF CARE
Problem: Diabetic Ketoacidosis  Goal: Fluid and Electrolyte Balance with Absence of Ketosis  Outcome: Ongoing, Progressing     Problem: Adult Inpatient Plan of Care  Goal: Absence of Hospital-Acquired Illness or Injury  Outcome: Ongoing, Progressing  Goal: Optimal Comfort and Wellbeing  Outcome: Ongoing, Progressing  Goal: Readiness for Transition of Care  Outcome: Ongoing, Progressing     Problem: Diabetes Comorbidity  Goal: Blood Glucose Level Within Targeted Range  Outcome: Ongoing, Progressing     Problem: Functional Ability Impaired COPD (Chronic Obstructive Pulmonary Disease)  Goal: Optimal Level of Functional [place]  Outcome: Ongoing, Progressing     Problem: Respiratory Compromise COPD (Chronic Obstructive Pulmonary Disease)  Goal: Effective Oxygenation and Ventilation  Outcome: Ongoing, Progressing

## 2022-11-08 NOTE — PLAN OF CARE
Mr. Rendon remains in ICU on Insulin gtt with accuchecks done every hour and titrations per insulin nomogram. Pt on 4L 02 via NC. SOB with minimal exertion noted. Dr. Cramer and Dr. Cazares notified. Pt initiated on Bipap which he didn't tolerated and was switched to Cpap. Dr. Cazares notified. Pt remains NPO. Afebrile. Pt able to reposition self in bed and sit onside of bed and stand to urinate. SOB with movement noted. Friend at bedside. POC discussed with pt whom verbalizes understanding. Labs to be checked frequently. Report given to JANEY Chung RN to assume care.

## 2022-11-09 PROBLEM — J18.9 PNEUMONIA: Status: ACTIVE | Noted: 2022-01-01

## 2022-11-09 PROBLEM — R65.20 SEVERE SEPSIS: Status: ACTIVE | Noted: 2022-01-01

## 2022-11-09 PROBLEM — Z97.8 ENDOTRACHEALLY INTUBATED: Status: ACTIVE | Noted: 2022-01-01

## 2022-11-09 PROBLEM — A41.9 SEVERE SEPSIS: Status: ACTIVE | Noted: 2022-01-01

## 2022-11-09 NOTE — ASSESSMENT & PLAN NOTE
Workup in ED remarkable for hyperglycemia, HAGMA, elevated beta hydroxy.   New diagnosis of DM  DKA protocol  Insulin drip now transitioned to subcutaneous insulin   -A1c greater than 14; will need insulin at time of discharge

## 2022-11-09 NOTE — EICU
07:56 Called into room via elert for emergent intubation.  Pt awake on Bipap & is calm & cooperative.  Physician verification done for Naty Meyers & Mahin Hart w/ Pulmonary Medicine.  07:58  Time-out completed using proper pt identifiers.   08:03  Etomidate 30 mg followed by Rocuronium 100 mg IVP given per Dr. Mullen.  08:04  Intubated w/ #8.0 oral ETT using u/s guidance.  Secured 23 at the teeth.  Bilateral breath sounds confirmed per bedside team.  Color change noted ETCO2 detector.    08:05  B/P 80/50  Pradip bump 100 mcg IV given per Dr. Mullen.  OGT inserted per Dr. Cazares using u/s guidance.  08:08  B/P 90/52.  08:13  B/P 90/50  Propofol 10 mg IVP followed Pradip bump 100 mcg IVP given per Dr. Mullen.   08:15  #14 Fr Tran catheter inserted per bedside team.   08:16  B/P 84/52  Pradip bump 100 mcg given per Dr. Mullen.   08:18  B/P 86/53  Pradip bump 100 mcg IVP given per Dr. Mullen.   08:20  B/P 102/61.  Pt resting quiet on ventilator support.  Tolerated procedures well.

## 2022-11-09 NOTE — PLAN OF CARE
Mr. Rendon remains in ICU on ventilator. He has a low grade temp and ->500 today. Bld cx positive. On IV antibiotics. BNP slightly elevated. He remains in bilat soft wrist restraints and minimal sedation with Propofol to maintain RASS -2. Bed alarm on. Siderails up x4. Family has been at his bedside majority of the day. Oral care provided. He has been turned every couple of hours. BUE and BLE elevated on pillows. Foam to bilat heels in place. He is NPO and OGT clamped. POC reviewed with son and other family members at bedside. Questions answered. Will cont to monitor and will report off to oncoming nurse to assume care.

## 2022-11-09 NOTE — EICU
EICU Physician Brief Note - Overnight Events    Called for fever 103F. WBC also rising. CxR showed small infiltrate vs atelectasis LLL, and persistent b/l interstitial infiltrates. Patient meets sepsis criteria.  Plan:  Pan-culture.  Broad spectrum coverage Zosyn/Vancomycin ordered (has been hospitalized >48 hours).  De-escalate based on cultures.  Meets criteria for sepsis. Will order fluid bolus. (Initially gave Lasix during acute respiratory distress, however upon review he had a normal BNP on 11/7 and EF 55% on echo 11/7).   Eicu is available should acute issues arise.

## 2022-11-09 NOTE — EICU
EICU Physician Brief Note - Overnight Events    Called on camera for acute dyspnea. Became tachycardic and increased WOB. Subsequently placed on CPAP and rec'd Duoneb. On camera, the patient is sitting at the side of the bed, tachypneic.  Per bedside RN and RT, lung sounds are diffusely diminished, some crackles, no wheezing.  A/Plan:  AECOPD.  Possible fluid overload.  Mag sulfate 2g ordered.  Changed to BIPAP 12/6 and tehn 10/6, RR 26-30, -1.1L  ABG ordered, no hypercapnia.  Lasix 20 mg IVP ordered as I&O + 1.3L.  Stat CxR ordered.    Eicu is available should acute issues arise.

## 2022-11-09 NOTE — NURSING
Condom cath came off. Replaced without it successfully staying on. eICU did not want to place barton since he is not retaining urine.

## 2022-11-09 NOTE — SUBJECTIVE & OBJECTIVE
Interval History:  Fever overnight.  Significant respiratory distress this morning prompting emergent intubation and broadening of antibiotics.  Now stabilized.  Discussed with family at bedside.    Review of Systems   Unable to perform ROS: Intubated   Objective:     Vital Signs (Most Recent):  Temp: 99.8 °F (37.7 °C) (11/09/22 1200)  Pulse: (!) 114 (11/09/22 1334)  Resp: 20 (11/09/22 1334)  BP: (!) 111/58 (11/09/22 1334)  SpO2: 95 % (11/09/22 1334)   Vital Signs (24h Range):  Temp:  [99.2 °F (37.3 °C)-101 °F (38.3 °C)] 99.8 °F (37.7 °C)  Pulse:  [] 114  Resp:  [13-56] 20  SpO2:  [88 %-100 %] 95 %  BP: ()/(50-86) 111/58     Weight: 90 kg (198 lb 6.6 oz)  Body mass index is 28.47 kg/m².    Intake/Output Summary (Last 24 hours) at 11/9/2022 1535  Last data filed at 11/9/2022 1300  Gross per 24 hour   Intake 2226 ml   Output 705 ml   Net 1521 ml        Physical Exam  Constitutional:       Appearance: He is not ill-appearing or toxic-appearing.      Interventions: He is sedated and intubated.   HENT:      Head: Normocephalic and atraumatic.      Mouth/Throat:      Mouth: Mucous membranes are moist.   Eyes:      Conjunctiva/sclera: Conjunctivae normal.      Pupils: Pupils are equal, round, and reactive to light.   Cardiovascular:      Rate and Rhythm: Normal rate and regular rhythm.      Heart sounds: Normal heart sounds.   Pulmonary:      Effort: Pulmonary effort is normal. He is intubated.      Breath sounds: Rales (bibasilar) present.   Abdominal:      General: There is no distension.      Palpations: Abdomen is soft.      Tenderness: There is no abdominal tenderness. There is no guarding.   Musculoskeletal:         General: Normal range of motion.      Cervical back: Normal range of motion and neck supple.      Right lower leg: Edema present.      Left lower leg: Edema present.   Skin:     General: Skin is warm and dry.   Neurological:      General: No focal deficit present.      Sensory: No sensory  deficit.      Comments: Facial droop   Psychiatric:      Comments: Unable to assess       Significant Labs: All pertinent labs within the past 24 hours have been reviewed.    Significant Imaging: I have reviewed all pertinent imaging results/findings within the past 24 hours.

## 2022-11-09 NOTE — NURSING
Notified eICU about patient's temperature reaching 103 F despite PRN tylenol given earlier. Also mentioned that abdomen was distended. KUB ordered.

## 2022-11-09 NOTE — PROGRESS NOTES
[facility] Medicine  Progress Note    Patient Name: Jose Luis Rendon  MRN: [id number]  Patient Class: IP- Inpatient   Admission Date: 11/7/2022  Length of Stay: 2 days  Attending Physician: Jermain Jackson, *  Primary Care Provider: Deric Armstrong MD        Subjective:     Principal Problem:DKA (diabetic ketoacidosis)        HPI:  67M with PMH of COPD, HTN, CVA, Bell's Palsy presents with c/o sob, chest pain, nausea, vomiting x 4 days. Pt first noticed chest pain after picking up a family member off the floor. He also has had multiple episodes of N/V with retching which has exacerbated symptoms. The pain radiates around right side to his back. Also has SOB which pt states is chronic but has progressively worsened recently. Denies fever, chills, palpitations, diaphoresis, abd pain, diarrhea, dysuria. No recent fall/trauma. Workup in ED remarkable for hyperglycemia, HAGMA, elevated beta hydroxy. CXR showing Subtle bibasilar interstitial opacities, no focal consolidation. No known history of CHF or DM. Patient will be admitted to hospital medicine service for further management.      Overview/Hospital Course:  No notes on file    Interval History:  Fever overnight.  Significant respiratory distress this morning prompting emergent intubation and broadening of antibiotics.  Now stabilized.  Discussed with family at bedside.    Review of Systems   Unable to perform ROS: Intubated   Objective:     Vital Signs (Most Recent):  Temp: 99.8 °F (37.7 °C) (11/09/22 1200)  Pulse: (!) 114 (11/09/22 1334)  Resp: 20 (11/09/22 1334)  BP: (!) 111/58 (11/09/22 1334)  SpO2: 95 % (11/09/22 1334)   Vital Signs (24h Range):  Temp:  [99.2 °F (37.3 °C)-101 °F (38.3 °C)] 99.8 °F (37.7 °C)  Pulse:  [] 114  Resp:  [13-56] 20  SpO2:  [88 %-100 %] 95 %  BP: ()/(50-86) 111/58     Weight: 90 kg (198 lb 6.6 oz)  Body mass index is 28.47 kg/m².    Intake/Output Summary (Last 24 hours) at 11/9/2022 1535  Last data  filed at 11/9/2022 1300  Gross per 24 hour   Intake 2226 ml   Output 705 ml   Net 1521 ml        Physical Exam  Constitutional:       Appearance: He is not ill-appearing or toxic-appearing.      Interventions: He is sedated and intubated.   HENT:      Head: Normocephalic and atraumatic.      Mouth/Throat:      Mouth: Mucous membranes are moist.   Eyes:      Conjunctiva/sclera: Conjunctivae normal.      Pupils: Pupils are equal, round, and reactive to light.   Cardiovascular:      Rate and Rhythm: Normal rate and regular rhythm.      Heart sounds: Normal heart sounds.   Pulmonary:      Effort: Pulmonary effort is normal. He is intubated.      Breath sounds: Rales (bibasilar) present.   Abdominal:      General: There is no distension.      Palpations: Abdomen is soft.      Tenderness: There is no abdominal tenderness. There is no guarding.   Musculoskeletal:         General: Normal range of motion.      Cervical back: Normal range of motion and neck supple.      Right lower leg: Edema present.      Left lower leg: Edema present.   Skin:     General: Skin is warm and dry.   Neurological:      General: No focal deficit present.      Sensory: No sensory deficit.      Comments: Facial droop   Psychiatric:      Comments: Unable to assess       Significant Labs: All pertinent labs within the past 24 hours have been reviewed.    Significant Imaging: I have reviewed all pertinent imaging results/findings within the past 24 hours.      Assessment/Plan:      * DKA (diabetic ketoacidosis)  Workup in ED remarkable for hyperglycemia, HAGMA, elevated beta hydroxy.   New diagnosis of DM  DKA protocol  Insulin drip now transitioned to subcutaneous insulin   -A1c greater than 14; will need insulin at time of discharge    Endotracheally intubated  - worsening respiratory failure today  - intubated 11/9  - Pulm following      Pneumonia  - suspect due to viral PNA  - COVID and flu negative on admission; repeat with RSV  - on BSABx for  now given acute decompensation  - empiric tamiflu      Centrilobular emphysema  -noted on imaging  -needs PFTs as outpatient and pulmonology follow-up      COPD with acute exacerbation  As above-nightly BiPAP for in addition to prednisone, azithromycin  -decompensation this morning prompting intubation  -continue steroids; on broad-spectrum antibiotics  -additional viral testing    Left-sided chest pain  Pain occurred after picking up family member off floor and after N/V with dry heaving  EKG showing nonspecific T wave changes  Trops neg x2  Likely MSK  Telemetry         Acute hypoxemic respiratory failure  Likely 2/2 COPD  CXR showing subtle bibasilar interstitial opacities.  No focal consolidation  CT chest 10/2021: Centrilobular emphysema with an upper lobe predominance.  Bibasilar crackles on exam  Standing duonebs  Started on azithromycin and prednisone; fever overnight, I have broadened antibiotics and we will repeat flu/RSV/COVID  Supp O2 PRN to keep O2 sat 88-92%  TTE  Strict I&O  Hold IV fluids    Benign essential HTN  Cont amlodipine      VTE Risk Mitigation (From admission, onward)         Ordered     enoxaparin injection 40 mg  Daily         11/07/22 1726     IP VTE LOW RISK PATIENT  Once         11/07/22 1231     Place sequential compression device  Until discontinued         11/07/22 1231                Discharge Planning   BELINDA:      Code Status: Full Code   Is the patient medically ready for discharge?:     Reason for patient still in hospital (select all that apply): Patient unstable  Discharge Plan A: Home            Critical care time spent on the evaluation and treatment of severe organ dysfunction, review of pertinent labs and imaging studies, discussions with consulting providers and discussions with patient/family: 45 minutes.      Jermain Jackson MD  Department of Hospital Medicine   [facility] - Intensive Care

## 2022-11-09 NOTE — EICU
EICU Physician Brief Note - Overnight Events    Called for . UA positive ketones. AM labs not yet in.  Plan:  Aspart insulin 10 units SC now.  Added on serum BHB.  Eicu is available should acute issues arise.

## 2022-11-09 NOTE — ASSESSMENT & PLAN NOTE
Acute hypoxemic respiratory failure due to pulmonary edema from suspected HFpEF (orthopnea, dyspnea, lower extremities edema, bi-basal crepitus and B lines on US) vs COPD exacerbation (significant history of smoking and emphysematous changes on CT scan)  ECHO showed mild concentric LVH, EF 55%, Mild right ventricular enlargement with normal right ventricular systolic function, PASP 29 and CVP of 3    Recommendations:  Continue DuoNebs  Prednisone 40 mg oral daily x 5 days  Azithromycin 500 mg daily  Continue pulse oximetry  CPAP at night

## 2022-11-09 NOTE — PLAN OF CARE
Problem: Adult Inpatient Plan of Care  Goal: Plan of Care Review  Outcome: Ongoing, Progressing     Problem: Diabetes Comorbidity  Goal: Blood Glucose Level Within Targeted Range  Outcome: Ongoing, Not Progressing     Problem: Respiratory Compromise COPD (Chronic Obstructive Pulmonary Disease)  Goal: Effective Oxygenation and Ventilation  Outcome: Ongoing, Not Progressing  Patient oriented but very lethargic. On bipap after respiratory distress. Tachycardic. Tolerating PO intake. Urine output adequate. Repositioned independently with minimal assistance. Safety precautions in place. Plan of care reviewed with patient. Refer to nursing notes for further information on events during night

## 2022-11-09 NOTE — ASSESSMENT & PLAN NOTE
Acute hypoxemic respiratory failure due to pulmonary edema from suspected HFpEF (orthopnea, dyspnea, lower extremities edema, bi-basal crepitus and B lines on US) vs COPD exacerbation (significant history of smoking and emphysematous changes on CT scan), now complicated by sepsis    ECHO showed mild concentric LVH, EF 55%, Mild right ventricular enlargement with normal right ventricular systolic function, PASP 29 and CVP of 3    Recommendations:  Continue ventilatory support (currently   Continue DuoNebs  Continue IV steroid  Continue broad spectrum antibiotics

## 2022-11-09 NOTE — ASSESSMENT & PLAN NOTE
Unclear etiology, suspected MSK as patient has chest tenderness on exam  EKG showed RBBB (old) and T wave abnormality in the inferior leads (new) but troponin negative    Continue aspirin and statin

## 2022-11-09 NOTE — PROGRESS NOTES
Pharmacokinetic Initial Assessment: IV Vancomycin    Assessment/Plan:    Initiate intravenous vancomycin with loading dose of 1750 mg once (20 mg/kg loading dose due to renal impairment) with subsequent doses when random concentrations are less than 20 mcg/mL  Desired empiric serum trough concentration is 15 to 20 mcg/mL  Draw vancomycin random level on 11/10 at 0400.  Pharmacy will continue to follow and monitor vancomycin.      Please contact pharmacy at extension [phone] with any questions regarding this assessment.     Thank you for the consult,   Paula Solorzano       Patient brief summary:  Jose Luis Rendon is a 67 y.o. male initiated on antimicrobial therapy with IV Vancomycin for treatment of suspected lower respiratory infection    Drug Allergies:   Review of patient's allergies indicates:   Allergen Reactions    Bee sting [allergen ext-venom-honey bee] Anaphylaxis and Swelling    Venom-yellow jacket Swelling       Actual Body Weight:   90.3 kg    Renal Function:   Estimated Creatinine Clearance: 39.4 mL/min (A) (based on SCr of 2 mg/dL (H)).,     Dialysis Method (if applicable):  NA    CBC (last 72 hours):  Recent Labs   Lab Result Units 11/07/22  1238 11/08/22  0410   WBC K/uL 12.19 15.43*   Hemoglobin g/dL 16.2 14.7   Hemoglobin A1C % >14.0*  --    Hematocrit % 49.7 44.7   Platelets K/uL 163 140*   Gran % % 79.7* 84.0*   Lymph % % 6.3* 3.0*   Mono % % 13.1 12.0   Eosinophil % % 0.0 0.0   Basophil % % 0.2 0.0   Differential Method  Automated Manual       Metabolic Panel (last 72 hours):  Recent Labs   Lab Result Units 11/07/22  1047 11/07/22  1118 11/07/22  1239 11/07/22  1652 11/07/22  2022 11/08/22  0024 11/08/22  0410 11/08/22  0825 11/08/22  1244   Sodium mmol/L  --  131* 133* 132* 132*  132* 132* 131*  131* 137 132*   Potassium mmol/L  --  4.3 4.1 3.8 3.5  3.5 4.2 4.3  4.1 3.9 4.3   Chloride mmol/L  --  97 96 98 99  99 97 99  99 96 97   CO2 mmol/L  --  12* 16* 17* 16*  16* 19* 17*  19* 25 24    Glucose mg/dL  --  586* 468* 346* 264*  264* 288* 265*  267* 246* 242*   Glucose, UA  4+*  --   --   --   --   --   --   --   --    BUN mg/dL  --  28* 26* 29* 29*  29* 30* 33*  34* 35* 38*   Creatinine mg/dL  --  1.4 1.5* 1.4 1.3  1.3 1.5* 1.6*  1.6* 1.8* 2.0*   Albumin g/dL  --  3.2*  --   --   --   --   --   --   --    Total Bilirubin mg/dL  --  1.6*  --   --   --   --   --   --   --    Alkaline Phosphatase U/L  --  97  --   --   --   --   --   --   --    AST U/L  --  27  --   --   --   --   --   --   --    ALT U/L  --  47*  --   --   --   --   --   --   --    Magnesium mg/dL  --   --   --   --   --   --  2.2  --   --    Phosphorus mg/dL  --   --   --   --  2.0*  --  2.8  --   --        Drug levels (last 3 results):  No results for input(s): VANCOMYCINRA, VANCORANDOM, VANCOMYCINPE, VANCOPEAK, VANCOMYCINTR, VANCOTROUGH in the last 72 hours.    Microbiologic Results:  Microbiology Results (last 7 days)       Procedure Component Value Units Date/Time    Blood culture [[id number]]     Order Status: Sent Specimen: Blood     Blood culture [[id number]]     Order Status: Sent Specimen: Blood     Culture, Respiratory with Gram Stain [[id number]]     Order Status: No result Specimen: Respiratory

## 2022-11-09 NOTE — SUBJECTIVE & OBJECTIVE
Interval History/Significant Events:   Overnight event, patient had a fever of 103 and leucocytosis. Patient was pan-cultured, started on broad spectrum antibiotics with zosyn and vancomycin and fluid bolus. This morning, patient developed worsening respiratory status and very poor mentation. Bed ECHO showed good EF, no right ventricular dilation, but there is increased b-lines in both lung fields. ABG showed hypoxemia with hypercapnia. Patient was intubated for respiratory failure and airway protection. Patient became hypotensive post intubation requiring vasopressor.   Son Jose Luis Rendon Jr updated about the father's clinical course.        Review of Systems Unable to obtain due to altered mental status  Objective:     Vital Signs (Most Recent):  Temp: 99.4 °F (37.4 °C) (11/09/22 1630)  Pulse: 110 (11/09/22 1645)  Resp: (!) 21 (11/09/22 1645)  BP: (!) 114/58 (11/09/22 1645)  SpO2: 98 % (11/09/22 1645)   Vital Signs (24h Range):  Temp:  [99.4 °F (37.4 °C)-101 °F (38.3 °C)] 99.4 °F (37.4 °C)  Pulse:  [] 110  Resp:  [13-56] 21  SpO2:  [91 %-100 %] 98 %  BP: ()/(50-86) 114/58   Weight: 90 kg (198 lb 6.6 oz)  Body mass index is 28.47 kg/m².      Intake/Output Summary (Last 24 hours) at 11/9/2022 1714  Last data filed at 11/9/2022 1658  Gross per 24 hour   Intake 2352.41 ml   Output 900 ml   Net 1452.41 ml       Physical Exam  Constitutional:       General: Significant respiratory distress on BiPAP     Appearance: Ill appearing  HENT:      Mouth/Throat:      Mouth: Mucous membranes are moist.   Cardiovascular:      Rate and Rhythm: Normal rate and regular rhythm.      Heart sounds: No murmur heard.  Pulmonary:      Effort: Pulmonary effort is normal.      Breath sounds: Rales present. No wheezing.   Abdominal:      Palpations: Abdomen is soft.      Tenderness: There is no abdominal tenderness.   Musculoskeletal:      Right lower leg: Edema present.      Left lower leg: Edema present.   Neurological:       Mental Status: Appears very lethargic, not following command   Psychiatric:         Mood and Affect: Mood normal.     Vents:  Vent Mode: A/CMV-VC (11/09/22 1605)  Set Rate: 20 BPM (11/09/22 1605)  Vt Set: 450 mL (11/09/22 1605)  PEEP/CPAP: 5 cmH20 (11/09/22 1605)  Oxygen Concentration (%): 70 (11/09/22 1645)  Peak Airway Pressure: 19.9 cmH2O (11/09/22 1605)  Total Ve: 8.91 L/m (11/09/22 1605)  F/VT Ratio<105 (RSBI): (!) 44.25 (11/09/22 1605)  Lines/Drains/Airways       Drain  Duration                  NG/OG Tube 11/09/22 0805 orogastric 16 Fr. Right mouth <1 day         Urethral Catheter 11/09/22 0815 Non-latex 14 Fr. <1 day              Airway  Duration                  Airway - Non-Surgical 11/09/22 0804 Endotracheal Tube <1 day              Peripheral Intravenous Line  Duration                  Peripheral IV - Single Lumen 11/08/22 2050 20 G Distal;Left;Posterior Forearm <1 day         Peripheral IV - Single Lumen 11/09/22 0500 20 G Posterior;Right Forearm <1 day         Peripheral IV - Single Lumen 11/09/22 0900 18 G Anterior;Distal;Left Upper Arm <1 day                  Significant Labs:    CBC/Anemia Profile:  Recent Labs   Lab 11/08/22  0410 11/09/22  0601   WBC 15.43* 12.44   HGB 14.7 15.5   HCT 44.7 47.7   * 86*   MCV 92 94   RDW 12.7 12.9        Chemistries:  Recent Labs   Lab 11/07/22  2022 11/08/22  0024 11/08/22  0410 11/08/22  0825 11/08/22  1244 11/09/22  0601   *  132*   < > 131*  131* 137 132* 127*   K 3.5  3.5   < > 4.3  4.1 3.9 4.3 3.8   CL 99  99   < > 99  99 96 97 95   CO2 16*  16*   < > 17*  19* 25 24 18*   BUN 29*  29*   < > 33*  34* 35* 38* 50*   CREATININE 1.3  1.3   < > 1.6*  1.6* 1.8* 2.0* 2.3*   CALCIUM 9.0  9.0   < > 8.9  8.9 10.0 9.2 8.8   MG  --   --  2.2  --   --  2.6   PHOS 2.0*  --  2.8  --   --   --     < > = values in this interval not displayed.       All pertinent labs within the past 24 hours have been reviewed.    Significant Imaging:  I have  reviewed all pertinent imaging results/findings within the past 24 hours.  I have reviewed and interpreted all pertinent imaging results/findings within the past 24 hours.

## 2022-11-09 NOTE — PROGRESS NOTES
[facility] - Intensive Care  Critical Care Medicine  Progress Note    Patient Name: Jose Luis Rendon  MRN: [id number]  Admission Date: 11/7/2022  Hospital Length of Stay: 1 days  Code Status: Full Code  Attending Provider: Jermain Jackson, *  Primary Care Provider: Deric Armstrong MD   Principal Problem: DKA (diabetic ketoacidosis)    Subjective:     HPI:  67 year old male with history of HTN, lacunar infarct, anxiety and Bell's Palsy that presented to the ED for the evaluation fo 3 days worsening exertional SOB, 1 pillow orthopnea and left sided shoulder pain that radiates to back and chest pain. Patient discloses lower extremities edema but no cough or fever. Patient discloses no history of heart disease or lung disease.   Patient has significant past history of smoking (45 year pack history; stopped in 2015).  On presentation, patient was found to have mild DKA and started on IVF and insulin drip.   MICU was called for desaturation. Examination remarkable for bi-basilar crackles and lower extremities pitting edema.   CXR showed increased interstitial markings  Troponin is negative. EKG showed RBBB (old) and T wave abnormality in the inferior leads (new)  Review of old imagings, CT scan of 10/28/2021 showed centrilobular emphysema with an upper lobe predominance and multiple bilateral solid pulmonary nodules.  Bed side Cardiac and lung US showed bilateral b lines (right > left) with hyper-dynamic heart  IVF was discontinued and one dose of IV lasix ordered      Interval History/Significant Events:   No acute overnight event. Tolerated CPAP last night. AG closed, and transitioned to subcutaneous insulin. There is improving SOB.    Review of Systems As above  Objective:     Vital Signs (Most Recent):  Temp: 99.2 °F (37.3 °C) (11/08/22 1600)  Pulse: 102 (11/08/22 1800)  Resp: (!) 33 (11/08/22 1800)  BP: (!) 97/54 (11/08/22 1800)  SpO2: (!) 94 % (11/08/22 1800)   Vital Signs (24h Range):  Temp:  [96.1 °F (35.6 °C)-99.8  °F (37.7 °C)] 99.2 °F (37.3 °C)  Pulse:  [] 102  Resp:  [11-34] 33  SpO2:  [85 %-100 %] 94 %  BP: ()/(52-82) 97/54   Weight: 90.3 kg (199 lb)  Body mass index is 28.55 kg/m².      Intake/Output Summary (Last 24 hours) at 11/8/2022 1901  Last data filed at 11/8/2022 1748  Gross per 24 hour   Intake 1045.05 ml   Output 100 ml   Net 945.05 ml       Physical Exam  Constitutional:       General: He is not in acute distress.     Appearance: Normal appearance. He is obese. He is not ill-appearing.   HENT:      Mouth/Throat:      Mouth: Mucous membranes are moist.   Cardiovascular:      Rate and Rhythm: Normal rate and regular rhythm.      Heart sounds: No murmur heard.  Pulmonary:      Effort: Pulmonary effort is normal.      Breath sounds: Rales present. No wheezing.   Abdominal:      Palpations: Abdomen is soft.      Tenderness: There is no abdominal tenderness.   Musculoskeletal:      Right lower leg: Edema present.      Left lower leg: Edema present.   Neurological:      Mental Status: He is alert.   Psychiatric:         Mood and Affect: Mood normal.        Vents:  Oxygen Concentration (%): 50 (11/08/22 0757)  Lines/Drains/Airways       Peripheral Intravenous Line  Duration                  Peripheral IV - Single Lumen 11/07/22 1125 20 G Anterior;Left;Proximal Upper Arm 1 day         Peripheral IV - Single Lumen 11/07/22 1335 20 G Right Antecubital 1 day                  Significant Labs:    CBC/Anemia Profile:  Recent Labs   Lab 11/07/22  1232 11/07/22  1238 11/08/22  0410   WBC  --  12.19 15.43*   HGB  --  16.2 14.7   HCT 48 49.7 44.7   PLT  --  163 140*   MCV  --  94 92   RDW  --  12.4 12.7        Chemistries:  Recent Labs   Lab 11/07/22  1118 11/07/22  1239 11/07/22  2022 11/08/22  0024 11/08/22  0410 11/08/22  0825 11/08/22  1244   *   < > 132*  132*   < > 131*  131* 137 132*   K 4.3   < > 3.5  3.5   < > 4.3  4.1 3.9 4.3   CL 97   < > 99  99   < > 99  99 96 97   CO2 12*   < > 16*  16*   <  > 17*  19* 25 24   BUN 28*   < > 29*  29*   < > 33*  34* 35* 38*   CREATININE 1.4   < > 1.3  1.3   < > 1.6*  1.6* 1.8* 2.0*   CALCIUM 9.4   < > 9.0  9.0   < > 8.9  8.9 10.0 9.2   ALBUMIN 3.2*  --   --   --   --   --   --    PROT 7.4  --   --   --   --   --   --    BILITOT 1.6*  --   --   --   --   --   --    ALKPHOS 97  --   --   --   --   --   --    ALT 47*  --   --   --   --   --   --    AST 27  --   --   --   --   --   --    MG  --   --   --   --  2.2  --   --    PHOS  --   --  2.0*  --  2.8  --   --     < > = values in this interval not displayed.       All pertinent labs within the past 24 hours have been reviewed.    Significant Imaging:  I have reviewed all pertinent imaging results/findings within the past 24 hours.  I have reviewed and interpreted all pertinent imaging results/findings within the past 24 hours.      ABG  Recent Labs   Lab 11/07/22  1232   PH 7.360   PO2 71*   PCO2 31.4*   HCO3 17.7*   BE -8     Assessment/Plan:     Pulmonary  Acute hypoxemic respiratory failure  Acute hypoxemic respiratory failure due to pulmonary edema from suspected HFpEF (orthopnea, dyspnea, lower extremities edema, bi-basal crepitus and B lines on US) vs COPD exacerbation (significant history of smoking and emphysematous changes on CT scan)  ECHO showed mild concentric LVH, EF 55%, Mild right ventricular enlargement with normal right ventricular systolic function, PASP 29 and CVP of 3    Recommendations:  Continue DuoNebs  Prednisone 40 mg oral daily x 5 days  Azithromycin 500 mg daily  Continue pulse oximetry  CPAP at night    Cardiac/Vascular  Left-sided chest pain  Unclear etiology, suspected MSK as patient has chest tenderness on exam  EKG showed RBBB (old) and T wave abnormality in the inferior leads (new) but troponin negative    Continue aspirin and statin    Benign essential HTN  Continue home dose of amlodipine  Monitor blood pressure    Endocrine  * DKA (diabetic ketoacidosis)  Mild DKA, resolved  New  diagnosis of Diabetes Mellitus to patient, of note, HBA1c last year was 7.3, now > 14    Transition to SQ insulin      Okay to step down from ICU      Critical Care Daily Checklist:     A: Awake: RASS Goal/Actual Goal:  NA  Actual:     B: Spontaneous Breathing Trial Performed?  NA   C: SAT & SBT Coordinated?  NA                   D: Delirium: CAM-ICU    E: Early Mobility Performed? Yes   F: Feeding Goal:    Status:         Current Diet Order   Procedures    Carb counting       AS: Analgesia/Sedation NA   T: Thromboembolic Prophylaxis Enoxaparin   H: HOB > 300 Yes   U: Stress Ulcer Prophylaxis (if needed) NA   G: Glucose Control On SQ insulin   B: Bowel Function    I: Indwelling Catheter (Lines & Tran) Necessity NA   D: De-escalation of Antimicrobials/Pharmacotherapies Azithromycin indicated for COPD exacerbation     Plan for the day/ETD Stepdown     Code Status:  Family/Goals of Care: Full Code            Critical secondary to Patient is currently on drug therapy requiring intensive monitoring for toxicity: Insulin drip and BiPAP     Critical care was time spent personally by me on the following activities: development of treatment plan with patient or surrogate and bedside caregivers, discussions with consultants, evaluation of patient's response to treatment, examination of patient, ordering and performing treatments and interventions, ordering and review of laboratory studies, ordering and review of radiographic studies, pulse oximetry, re-evaluation of patient's condition. This critical care time did not overlap with that of any other provider or involve time for any procedures.     Thank you for your consult.      Naty Cazares MD  [employer] PCCM Fellow, [facility] - Intensive Care    Pt seen and examined with Pulmonary/Critical Care team. Critical Care time was spent validating the history and physical exam, reviewing the lab and imaging results, and discussing the care of the patient with the bedside  nurse. The following additional comments are made:    Respiratory distress seems to be more related to his COPD.  We have corrected his wide AG metabolic acidosis from his DKA.  We are currently managing with NIV. He appears to be euvolemic. Will repeat CXR.    Critical Care time 35 minutes    Darrius Felton MD  Phone [phone]

## 2022-11-09 NOTE — ASSESSMENT & PLAN NOTE
Mild DKA, resolved  New diagnosis of Diabetes Mellitus to patient, of note, HBA1c last year was 7.3, now > 14    Transition to SQ insulin

## 2022-11-09 NOTE — ASSESSMENT & PLAN NOTE
Likely 2/2 COPD  CXR showing subtle bibasilar interstitial opacities.  No focal consolidation  CT chest 10/2021: Centrilobular emphysema with an upper lobe predominance.  Bibasilar crackles on exam  Standing duonebs  Started on azithromycin and prednisone; fever overnight, I have broadened antibiotics and we will repeat flu/RSV/COVID  Supp O2 PRN to keep O2 sat 88-92%  TTE  Strict I&O  Hold IV fluids

## 2022-11-09 NOTE — NURSING
Patient having acute episode of dyspnea. Currently on cpap. Placed self in tripod position but is unable to recover. Appears air-hungry and is diaphoretic. Tachycardic in 130s. Sats 96-97%. Called eICU; ABG, stat chest x-ray, 20 mg lasix, 2g mag, and bipap setting change ordered.

## 2022-11-09 NOTE — ASSESSMENT & PLAN NOTE
As above-nightly BiPAP for in addition to prednisone, azithromycin  -decompensation this morning prompting intubation  -continue steroids; on broad-spectrum antibiotics  -additional viral testing

## 2022-11-09 NOTE — SUBJECTIVE & OBJECTIVE
Interval History/Significant Events:   No acute overnight event. Tolerated CPAP last night. AG closed, and transitioned to subcutaneous insulin. There is improving SOB.    Review of Systems As above  Objective:     Vital Signs (Most Recent):  Temp: 99.2 °F (37.3 °C) (11/08/22 1600)  Pulse: 102 (11/08/22 1800)  Resp: (!) 33 (11/08/22 1800)  BP: (!) 97/54 (11/08/22 1800)  SpO2: (!) 94 % (11/08/22 1800)   Vital Signs (24h Range):  Temp:  [96.1 °F (35.6 °C)-99.8 °F (37.7 °C)] 99.2 °F (37.3 °C)  Pulse:  [] 102  Resp:  [11-34] 33  SpO2:  [85 %-100 %] 94 %  BP: ()/(52-82) 97/54   Weight: 90.3 kg (199 lb)  Body mass index is 28.55 kg/m².      Intake/Output Summary (Last 24 hours) at 11/8/2022 1901  Last data filed at 11/8/2022 1748  Gross per 24 hour   Intake 1045.05 ml   Output 100 ml   Net 945.05 ml       Physical Exam  Constitutional:       General: He is not in acute distress.     Appearance: Normal appearance. He is obese. He is not ill-appearing.   HENT:      Mouth/Throat:      Mouth: Mucous membranes are moist.   Cardiovascular:      Rate and Rhythm: Normal rate and regular rhythm.      Heart sounds: No murmur heard.  Pulmonary:      Effort: Pulmonary effort is normal.      Breath sounds: Rales present. No wheezing.   Abdominal:      Palpations: Abdomen is soft.      Tenderness: There is no abdominal tenderness.   Musculoskeletal:      Right lower leg: Edema present.      Left lower leg: Edema present.   Neurological:      Mental Status: He is alert.   Psychiatric:         Mood and Affect: Mood normal.        Vents:  Oxygen Concentration (%): 50 (11/08/22 0757)  Lines/Drains/Airways       Peripheral Intravenous Line  Duration                  Peripheral IV - Single Lumen 11/07/22 1125 20 G Anterior;Left;Proximal Upper Arm 1 day         Peripheral IV - Single Lumen 11/07/22 1335 20 G Right Antecubital 1 day                  Significant Labs:    CBC/Anemia Profile:  Recent Labs   Lab 11/07/22  1232  11/07/22  1238 11/08/22  0410   WBC  --  12.19 15.43*   HGB  --  16.2 14.7   HCT 48 49.7 44.7   PLT  --  163 140*   MCV  --  94 92   RDW  --  12.4 12.7        Chemistries:  Recent Labs   Lab 11/07/22  1118 11/07/22  1239 11/07/22 2022 11/08/22  0024 11/08/22  0410 11/08/22  0825 11/08/22  1244   *   < > 132*  132*   < > 131*  131* 137 132*   K 4.3   < > 3.5  3.5   < > 4.3  4.1 3.9 4.3   CL 97   < > 99  99   < > 99  99 96 97   CO2 12*   < > 16*  16*   < > 17*  19* 25 24   BUN 28*   < > 29*  29*   < > 33*  34* 35* 38*   CREATININE 1.4   < > 1.3  1.3   < > 1.6*  1.6* 1.8* 2.0*   CALCIUM 9.4   < > 9.0  9.0   < > 8.9  8.9 10.0 9.2   ALBUMIN 3.2*  --   --   --   --   --   --    PROT 7.4  --   --   --   --   --   --    BILITOT 1.6*  --   --   --   --   --   --    ALKPHOS 97  --   --   --   --   --   --    ALT 47*  --   --   --   --   --   --    AST 27  --   --   --   --   --   --    MG  --   --   --   --  2.2  --   --    PHOS  --   --  2.0*  --  2.8  --   --     < > = values in this interval not displayed.       All pertinent labs within the past 24 hours have been reviewed.    Significant Imaging:  I have reviewed all pertinent imaging results/findings within the past 24 hours.  I have reviewed and interpreted all pertinent imaging results/findings within the past 24 hours.

## 2022-11-09 NOTE — NURSING
Called eICU about blood sugar 454 and ketones in urinalysis. Beta hydroxy lab added on. One time dose of insulin ordered for BG. Asked for repeat ABG. Said since last ABG was normal that one is not needed now.

## 2022-11-09 NOTE — ASSESSMENT & PLAN NOTE
- suspect due to viral PNA  - COVID and flu negative on admission; repeat with RSV  - on BSABx for now given acute decompensation  - empiric tamiflu

## 2022-11-09 NOTE — PROCEDURES
"Jose Luis Rendon is a 67 y.o. male patient.    Temp: (!) 101 °F (38.3 °C) (11/08/22 1956)  Pulse: (!) 117 (11/09/22 0822)  Resp: 20 (11/09/22 0822)  BP: 102/61 (11/09/22 0822)  SpO2: 95 % (11/09/22 0822)  Weight: 90.3 kg (199 lb) (11/08/22 1008)  Height: 5' 10" (177.8 cm) (11/08/22 1008)       Intubation    Date/Time: 11/9/2022 8:23 AM  Location procedure was performed: [facility] ICU  Performed by: Naty Cazares MD  Authorized by: Mahin Felton MD   Pre-operative diagnosis: Respiratory failure  Post-operative diagnosis: Respiratory failure  Consent Done: Emergent Situation  Indications: respiratory failure, airway protection, hypercapnia and hypoxemia  Intubation method: video-assisted  Patient status: paralyzed (RSI)  Preoxygenation: BiPAP.  Sedatives: etomidate  Paralytic: rocuronium  Laryngoscope size: Glide 3  Tube size: 8.0 mm  Tube type: cuffed  Number of attempts: 1  Post-procedure assessment: chest rise and CO2 detector  Breath sounds: rales/crackles  Cuff inflated: yes  ETT to lip: 23 cm  Tube secured with: adhesive tape and ETT weber  Patient tolerance: Patient tolerated the procedure well with no immediate complications  Complications: No  Comments: OG tube placed        11/9/2022    Pt seen and examined with Pulmonary/Critical Care team. Time was spent validating the history and physical exam, reviewing the lab and imaging results, and discussing the care of the patient with the bedside nurse. The following additional comments are made:    Supervised in it's entirety by me.    Darrius Felton MD  Phone [phone]      "

## 2022-11-10 PROBLEM — N18.31 ACUTE RENAL FAILURE WITH ACUTE TUBULAR NECROSIS SUPERIMPOSED ON STAGE 3A CHRONIC KIDNEY DISEASE: Status: ACTIVE | Noted: 2022-01-01

## 2022-11-10 PROBLEM — R07.9 LEFT-SIDED CHEST PAIN: Status: RESOLVED | Noted: 2022-01-01 | Resolved: 2022-01-01

## 2022-11-10 PROBLEM — N18.4 ACUTE RENAL FAILURE WITH ACUTE TUBULAR NECROSIS SUPERIMPOSED ON STAGE 4 CHRONIC KIDNEY DISEASE: Status: ACTIVE | Noted: 2022-01-01

## 2022-11-10 PROBLEM — R65.21 SEPTIC SHOCK: Status: ACTIVE | Noted: 2022-01-01

## 2022-11-10 PROBLEM — R57.9 SHOCK, UNSPECIFIED: Status: ACTIVE | Noted: 2022-01-01

## 2022-11-10 PROBLEM — R78.81 MSSA BACTEREMIA: Status: ACTIVE | Noted: 2022-01-01

## 2022-11-10 PROBLEM — B95.8 BACTEREMIA DUE TO STAPHYLOCOCCUS: Status: ACTIVE | Noted: 2022-01-01

## 2022-11-10 PROBLEM — N17.0 ACUTE RENAL FAILURE WITH ACUTE TUBULAR NECROSIS SUPERIMPOSED ON STAGE 4 CHRONIC KIDNEY DISEASE: Status: ACTIVE | Noted: 2022-01-01

## 2022-11-10 PROBLEM — I48.91 ATRIAL FIBRILLATION WITH RVR: Status: ACTIVE | Noted: 2022-01-01

## 2022-11-10 PROBLEM — B95.1 BACTEREMIA DUE TO GROUP B STREPTOCOCCUS: Status: ACTIVE | Noted: 2022-01-01

## 2022-11-10 PROBLEM — B95.61 MSSA BACTEREMIA: Status: ACTIVE | Noted: 2022-01-01

## 2022-11-10 NOTE — EICU
eICU Brief Note:    ABG reviewed.  RR increased to 26 and bicarbonate 1 amp given by MD  Levoaraed started at low dose    A Fib with RVR  S/p Cardizem 5 mg, then 10 mg  S/p Amiodorone 150 mg IV loading followed by IV infusion at 1 mg/min  S/p Digoxin 500 mcg IV    Discussed plan with RN. Will try fluid bolus  ml  Recommend cardiology consultation  Consider cardioversion

## 2022-11-10 NOTE — ASSESSMENT & PLAN NOTE
- likely due to post-viral PNA with staph bacteremia  - likely distributive component due to sedation  - wean off levophed this am  - continue IV abx  - repeat BCx

## 2022-11-10 NOTE — ASSESSMENT & PLAN NOTE
Patient with Paroxysmal (<7 days) atrial fibrillation which is controlled currently with Amiodarone. Patient is currently in sinus rhythm.[id number] Score: 2.  Anticoagulation indicated. Anticoagulation done with lovenox.

## 2022-11-10 NOTE — EICU
eICU Note :    Called by the [facility] eRN:    Problem:Patient with A. Fib RVR despite diltiazem injection which was given earlier, Levo fed for blood pressure, Received Cardizem bolus     Pertinent History and labs reviewed : 66 y/o     DKA (diabetic ketoacidosis)    Benign essential HTN    Acute hypoxemic respiratory failure    Left-sided chest pain    COPD with acute exacerbation    Centrilobular emphysema    Pneumonia    Endotracheally intubated    Severe sepsis       Treatment /Intervention given:  I gave digoxin 0.25 mg IV push ×1, sodium bicarb 1 amp 50 mEq ×1, increased respiratory to 26/min from 18, patient received diltiazem, amiodarone bolus and GTT, patient is on Levophed, I also gave calcium gluconate 1 g IV piggyback. His heart rate was in the 170s      Deanna Burt M.D  eICU Physician

## 2022-11-10 NOTE — EICU
eICU Brief Note:    Called by RN for Afib with RVR.  File review including Ecg.  Camera assessment done.  Spoke with RN  IV Cardizem 5 mg stat  Temporarily HR reduced to 140's.  PCP at site and ordered Amiodarone loading.  Case handed to him and signed off!

## 2022-11-10 NOTE — PROGRESS NOTES
Pharmacist Renal Dose Adjustment Note    Jose Luis Rendon is a 67 y.o. male being treated with the medication tamiflu    Patient Data:    Vital Signs (Most Recent):  Temp: 96.6 °F (35.9 °C) (11/10/22 0730)  Pulse: 83 (11/10/22 0948)  Resp: (!) 28 (11/10/22 0948)  BP: 105/63 (11/10/22 0948)  SpO2: 96 % (11/10/22 0948)   Vital Signs (72h Range):  Temp:  [96.1 °F (35.6 °C)-101 °F (38.3 °C)]   Pulse:  []   Resp:  [11-56]   BP: ()/(50-99)   SpO2:  [85 %-100 %]      Recent Labs   Lab 11/09/22  2352 11/10/22  0426 11/10/22  0758   CREATININE 3.0* 3.2* 3.8*     Serum creatinine: 3.8 mg/dL (H) 11/10/22 0758  Estimated creatinine clearance: 20.7 mL/min (A)    Medication:tamiflu dose: 75mg frequency q24h will be changed to medication:tamiflu dose:30mg frequency:q24h    Pharmacist's Name: Gaurav Flowers  Pharmacist's Extension: [phone]

## 2022-11-10 NOTE — PROGRESS NOTES
[facility]                   Pharmacy  Pharmacy Vancomycin/AG Sign-off    Therapy with vancomycin completed and/or consult discontinued by provider.  Pharmacy will sign off, please re-consult as needed. Thank you for allowing us to participate in this patient's care.     Nellie Giordano, PharmD    [phone]

## 2022-11-10 NOTE — PROGRESS NOTES
[facility] - Intensive Care  Critical Care Medicine  Progress Note    Patient Name: Jose Luis Rendon  MRN: [id number]  Admission Date: 11/7/2022  Hospital Length of Stay: 2 days  Code Status: Full Code  Attending Provider: Jermain Jackson, *  Primary Care Provider: Deric Armstrong MD   Principal Problem: DKA (diabetic ketoacidosis)    Subjective:     HPI:  67 year old male with history of HTN, lacunar infarct, anxiety and Bell's Palsy that presented to the ED for the evaluation fo 3 days worsening exertional SOB, 1 pillow orthopnea and left sided shoulder pain that radiates to back and chest pain. Patient discloses lower extremities edema but no cough or fever. Patient discloses no history of heart disease or lung disease.   Patient has significant past history of smoking (45 year pack history; stopped in 2015).  On presentation, patient was found to have mild DKA and started on IVF and insulin drip.   MICU was called for desaturation. Examination remarkable for bi-basilar crackles and lower extremities pitting edema.   CXR showed increased interstitial markings  Troponin is negative. EKG showed RBBB (old) and T wave abnormality in the inferior leads (new)  Review of old imagings, CT scan of 10/28/2021 showed centrilobular emphysema with an upper lobe predominance and multiple bilateral solid pulmonary nodules.  Bed side Cardiac and lung US showed bilateral b lines (right > left) with hyper-dynamic heart  IVF was discontinued and one dose of IV lasix ordered      Interval History/Significant Events:   Overnight event, patient had a fever of 103 and leucocytosis. Patient was pan-cultured, started on broad spectrum antibiotics with zosyn and vancomycin and fluid bolus. This morning, patient developed worsening respiratory status and very poor mentation. Bed ECHO showed good EF, no right ventricular dilation, but there is increased b-lines in both lung fields. ABG showed hypoxemia with hypercapnia. Patient was intubated  for respiratory failure and airway protection. Patient became hypotensive post intubation requiring vasopressor.   Son Jose Luis Rendon Jr updated about the father's clinical course.        Review of Systems Unable to obtain due to altered mental status  Objective:     Vital Signs (Most Recent):  Temp: 99.4 °F (37.4 °C) (11/09/22 1630)  Pulse: 110 (11/09/22 1645)  Resp: (!) 21 (11/09/22 1645)  BP: (!) 114/58 (11/09/22 1645)  SpO2: 98 % (11/09/22 1645)   Vital Signs (24h Range):  Temp:  [99.4 °F (37.4 °C)-101 °F (38.3 °C)] 99.4 °F (37.4 °C)  Pulse:  [] 110  Resp:  [13-56] 21  SpO2:  [91 %-100 %] 98 %  BP: ()/(50-86) 114/58   Weight: 90 kg (198 lb 6.6 oz)  Body mass index is 28.47 kg/m².      Intake/Output Summary (Last 24 hours) at 11/9/2022 1714  Last data filed at 11/9/2022 1658  Gross per 24 hour   Intake 2352.41 ml   Output 900 ml   Net 1452.41 ml       Physical Exam  Constitutional:       General: Significant respiratory distress on BiPAP     Appearance: Ill appearing  HENT:      Mouth/Throat:      Mouth: Mucous membranes are moist.   Cardiovascular:      Rate and Rhythm: Normal rate and regular rhythm.      Heart sounds: No murmur heard.  Pulmonary:      Effort: Pulmonary effort is normal.      Breath sounds: Rales present. No wheezing.   Abdominal:      Palpations: Abdomen is soft.      Tenderness: There is no abdominal tenderness.   Musculoskeletal:      Right lower leg: Edema present.      Left lower leg: Edema present.   Neurological:      Mental Status: Appears very lethargic, not following command   Psychiatric:         Mood and Affect: Mood normal.     Vents:  Vent Mode: A/CMV-VC (11/09/22 1605)  Set Rate: 20 BPM (11/09/22 1605)  Vt Set: 450 mL (11/09/22 1605)  PEEP/CPAP: 5 cmH20 (11/09/22 1605)  Oxygen Concentration (%): 70 (11/09/22 1645)  Peak Airway Pressure: 19.9 cmH2O (11/09/22 1605)  Total Ve: 8.91 L/m (11/09/22 1605)  F/VT Ratio<105 (RSBI): (!) 44.25 (11/09/22 1605)  Lines/Drains/Airways        Drain  Duration                  NG/OG Tube 11/09/22 0805 orogastric 16 Fr. Right mouth <1 day         Urethral Catheter 11/09/22 0815 Non-latex 14 Fr. <1 day              Airway  Duration                  Airway - Non-Surgical 11/09/22 0804 Endotracheal Tube <1 day              Peripheral Intravenous Line  Duration                  Peripheral IV - Single Lumen 11/08/22 2050 20 G Distal;Left;Posterior Forearm <1 day         Peripheral IV - Single Lumen 11/09/22 0500 20 G Posterior;Right Forearm <1 day         Peripheral IV - Single Lumen 11/09/22 0900 18 G Anterior;Distal;Left Upper Arm <1 day                  Significant Labs:    CBC/Anemia Profile:  Recent Labs   Lab 11/08/22  0410 11/09/22  0601   WBC 15.43* 12.44   HGB 14.7 15.5   HCT 44.7 47.7   * 86*   MCV 92 94   RDW 12.7 12.9        Chemistries:  Recent Labs   Lab 11/07/22  2022 11/08/22  0024 11/08/22  0410 11/08/22  0825 11/08/22  1244 11/09/22  0601   *  132*   < > 131*  131* 137 132* 127*   K 3.5  3.5   < > 4.3  4.1 3.9 4.3 3.8   CL 99  99   < > 99  99 96 97 95   CO2 16*  16*   < > 17*  19* 25 24 18*   BUN 29*  29*   < > 33*  34* 35* 38* 50*   CREATININE 1.3  1.3   < > 1.6*  1.6* 1.8* 2.0* 2.3*   CALCIUM 9.0  9.0   < > 8.9  8.9 10.0 9.2 8.8   MG  --   --  2.2  --   --  2.6   PHOS 2.0*  --  2.8  --   --   --     < > = values in this interval not displayed.       All pertinent labs within the past 24 hours have been reviewed.    Significant Imaging:  I have reviewed all pertinent imaging results/findings within the past 24 hours.  I have reviewed and interpreted all pertinent imaging results/findings within the past 24 hours.      ABG  Recent Labs   Lab 11/09/22  1734   PH 7.197*   PO2 78*   PCO2 63.7*   HCO3 24.7   BE -3     Assessment/Plan:     Pulmonary  Acute hypoxemic respiratory failure  Acute hypoxemic respiratory failure due to pulmonary edema from suspected HFpEF (orthopnea, dyspnea, lower extremities edema,  bi-basal crepitus and B lines on US) vs COPD exacerbation (significant history of smoking and emphysematous changes on CT scan), now complicated by sepsis    ECHO showed mild concentric LVH, EF 55%, Mild right ventricular enlargement with normal right ventricular systolic function, PASP 29 and CVP of 3    Recommendations:  Continue ventilatory support, repeat ABG showed PH 7.197, PCO2 63.7, PO2 78; the TV increased to 500 and PEEP to 7. Pplat < 30, Ppeak < 40. Repeat ABG in 1 to 2 hours  Continue DuoNebs  Continue IV steroid  Continue broad spectrum antibiotics  Continue propofol for sedation, titrate to RASS of -2    Severe sepsis  Blood culture growing Gram positive cocci in chains resembling Staph  Patient is on vancomycin and zosyn  Continue antibiotics    Hyperglycemia, uncontrolled  Resume insulin drip per protocol  POC glucose check q 1 hourly    Thrombocytopenia  HIT score of 1 (low probability for HIT), Likely due to sepsis  Check B12 and folate acid level  Continue to monitor    NADIYA  Due to pre-renal vs ATN  Avoid nephrotoxic meds  Continue to monitor urine output     Critical Care Daily Checklist:    A: Awake: RASS Goal/Actual Goal:    Actual: Casiano Agitation Sedation Scale (RASS): Alert and calm   B: Spontaneous Breathing Trial Performed?     C: SAT & SBT Coordinated?  N/A                    D: Delirium: CAM-ICU Overall CAM-ICU: Negative   E: Early Mobility Performed? Yes   F: Feeding Goal:    Status:     Current Diet Order   Procedures    Diet NPO      AS: Analgesia/Sedation Propofol   T: Thromboembolic Prophylaxis Enoxaparin   H: HOB > 300 Yes   U: Stress Ulcer Prophylaxis (if needed) PPI   G: Glucose Control On insulin drip   B: Bowel Function Stool Occurrence: 1   I: Indwelling Catheter (Lines & Tran) Necessity Tran, peripheral line   D: De-escalation of Antimicrobials/Pharmacotherapies N/A    Plan for the day/ETD Intubation, antibiotics, follow up culture    Code Status:  Family/Goals of Care:  Full Code  Son updated at bed side       Critical secondary to Patient has an abrupt change in neurologic status and respiratory status.     Critical care was time spent personally by me on the following activities: development of treatment plan with patient or surrogate and bedside caregivers, discussions with consultants, evaluation of patient's response to treatment, examination of patient, ordering and performing treatments and interventions, ordering and review of laboratory studies, ordering and review of radiographic studies, pulse oximetry, re-evaluation of patient's condition. This critical care time did not overlap with that of any other provider or involve time for any procedures.     Naty Cazares MD  [employer] PCCM Fellow, [facility] - Intensive Care    Pt seen and examined with Pulmonary/Critical Care team. Critical Care time was spent validating the history and physical exam, reviewing the lab and imaging results, and discussing the care of the patient with the bedside nurse. The following additional comments are made:    Clinical course is most consistent with COPD exacerbation which has clinically worsened.  Pt now with hypercapnic respiratory failure.  He will need intubation. This is most consistent with a viral exacerbation of both his COPD and his DKA.  There is a LLL infiltrate that has been present since admit and may represent a bacterial infection. He has also developed acute kidney injury.  We have broadened out abx coverage. I do not think that this is due to volume overload.    Critical Care time 90 minutes    Darrius Felton MD  Phone [phone]

## 2022-11-10 NOTE — PROGRESS NOTES
During shift report, tachycardia noted on telemetry 170-180's. EKG obtained at 1944 showed afib RVR. Catherine LI camered in room and Dr. Lester with hospital medicine called to bedside.     1945 - Dr. Carrero ordered 5mg Cardizem IVP             Dr. Lester arrived to bedside. STAT labs and ABG ordered.     1955 - HR still sustaining 170's. Amio bolus and continuous infusion ordered.              BP 70/50's after bolus - Levo gtt initiated at 2013.     2015 - HR remains in 160-170's. Dr. Lester called back to bedside. Order to give 10 mg diltiazem IVP. Slight response with HR down to 150's. [facility] Dr. Rose consulted for further recommendations.     Orders placed by Dr. Lester for calcium gluconate 1g, digoxin 500mcg IVP and sodium bicarb 50meq per Catherine LI recommendation.     Dr. Carrero with [facility] suggests 500cc NS bolus. If no improvement in HR within 30-45 mins, will consider cardioversion. Dr. Lester updated on plan of care.

## 2022-11-10 NOTE — ASSESSMENT & PLAN NOTE
Patient with acute kidney injury likely due to acute tubular necrosis NADIYA is currently worsening. Labs reviewed- Renal function/electrolytes with Estimated Creatinine Clearance: 20.7 mL/min (A) (based on SCr of 3.8 mg/dL (H)). according to latest data. Monitor urine output and serial BMP and adjust therapy as needed. Avoid nephrotoxins and renally dose meds for GFR listed above.   - likely due to septic shock/DKA  - BP support with pressors as needed  - suspect will respond to fluid

## 2022-11-10 NOTE — ASSESSMENT & PLAN NOTE
Likely 2/2 COPD  CXR showing subtle bibasilar interstitial opacities.  No focal consolidation  CT chest 10/2021: Centrilobular emphysema with an upper lobe predominance.  Bibasilar crackles on exam  Standing duonebs  Started on azithromycin and prednisone; fevers overnight, broadened antibiotics and repeating flu/RSV/COVID  Supp O2 PRN to keep O2 sat 88-92%  TTE  Strict I&O  Hold IV fluids

## 2022-11-10 NOTE — RESPIRATORY THERAPY
After ABG done at 2207, new vent settings:    Vt 500; RR 28; PEEP 10; FiO2 70%.     Per MATTHEW LI, Alise waddell.

## 2022-11-10 NOTE — PROGRESS NOTES
K+ 3.5 on most recent BMP. Called Dr. Carrero for replacement. Also notified him of low urine output. Will order K+ replacement and 500cc IVF bolus.

## 2022-11-10 NOTE — PROGRESS NOTES
The sw met with the pt's x-wife Lacy [phone] in the horowitz of the ICU and walked her into the pt's room b/c she was crying uncontrollably outside in the horowitz. She states they were  for over 46 years and he's the love of her life. The sw offered emotional support.

## 2022-11-10 NOTE — PLAN OF CARE
Problem: Adult Inpatient Plan of Care  Goal: Plan of Care Review  Outcome: Ongoing, Progressing     Pt remains intubated and sedated on low dose propofol and versed gtt. Pt calm, responds to pain. Pt with episode of afib RVR at beginning of shift - see previous note for details; Amio gtt now infusing. BP requiring levo gtt to maintain MAP >65. SpO2 99% on 55% FiO2; ABGs monitored and vent setting changes made accordingly. Insulin gtt infusing; accucheck q1hr and BMP monitored q4hrs. Tran in place with marginal UOP; 1L NS bolus given in total with minimal improvement. Abdomen round and distended; scheduled miralax given without BM for shift. Skin bruised but intact. Safety maintained; restraints in place.

## 2022-11-10 NOTE — PROGRESS NOTES
Progress Note  [facility]    Attending: Dr. DEGROOT  Fellow: Dr. Cazares  Admit Date: 11/7/2022  Today's Date: 11/10/2022    SUBJECTIVE:     HPI:  67M with PMH of COPD, HTN, CVA, Bell's Palsy presents with c/o sob, chest pain, nausea, vomiting x 4 days. Pt first noticed chest pain after picking up a family member off the floor. He also has had multiple episodes of N/V with retching which has exacerbated symptoms. The pain radiates around right side to his back. Also has SOB which pt states is chronic but has progressively worsened recently. Denies fever, chills, palpitations, diaphoresis, abd pain, diarrhea, dysuria. No recent fall/trauma. Workup in ED remarkable for hyperglycemia, HAGMA, elevated beta hydroxy. CXR showing Subtle bibasilar interstitial opacities, no focal consolidation. No known history of CHF or DM. Patient will be admitted to hospital medicine service for further management.    Interval Hx: Patient seen and examined this morning. Patient with overnight event of Afib w/ RVR. Patient was given 1x diltazem, 1x digoxin, midazolam drip was started as well as amiodarone drip. Patient was able to be pharmacologically converted to NSR in the AM. Patient sedated and intubated today.    Review of Systems   Unable to perform ROS: Intubated     OBJECTIVE:     Vital Signs Trends/Hx Reviewed  Vitals:    11/10/22 1230 11/10/22 1300 11/10/22 1418 11/10/22 1420   BP: (!) 100/55 (!) 101/57  (!) 104/58   BP Location:       Patient Position:       Pulse: 92 87 83 83   Resp: 20 (!) 23 (!) 21 20   Temp:       TempSrc:       SpO2: (!) 92% (!) 92% (!) 93% (!) 93%   Weight:       Height:           Vent Mode: A/CMV-VC  Oxygen Concentration (%):  [50-70] 55  Resp Rate Total:  [15 br/min-29 br/min] 21 br/min  Vt Set:  [450 mL-500 mL] 500 mL  PEEP/CPAP:  [1.8 cmH20-10.4 cmH20] 8 cmH20  Mean Airway Pressure:  [8.5 koG11-57.6 cmH20] 12.2 cmH20    Physical Exam  Constitutional:       Appearance:  He is not diaphoretic.      Comments: Patient is sedated and intubated   HENT:      Head: Normocephalic and atraumatic.      Right Ear: External ear normal.      Left Ear: External ear normal.      Mouth/Throat:      Mouth: Mucous membranes are moist.   Cardiovascular:      Rate and Rhythm: Normal rate and regular rhythm.      Pulses: Normal pulses.      Heart sounds: Normal heart sounds. No murmur heard.     Comments: Edema present on LE bilaterally  Pulmonary:      Breath sounds: Rales present.   Abdominal:      General: There is no distension.      Palpations: Abdomen is soft.   Skin:     General: Skin is warm.      Capillary Refill: Capillary refill takes less than 2 seconds.       Laboratory:  Recent Labs   Lab 11/10/22  0426   WBC 10.38   RBC 4.63   HGB 14.1   HCT 42.7   PLT 58*   MCV 92   MCH 30.5   MCHC 33.0     Recent Labs   Lab 11/10/22  0426 11/10/22  0758 11/10/22  1228   *   < > 134*   K 3.6   < > 3.4*      < > 98   CO2 17*   < > 18*   BUN 66*   < > 71*   CREATININE 3.2*   < > 3.6*   MG 2.9*  --   --     < > = values in this interval not displayed.     Recent Labs   Lab 11/10/22  0543   PH 7.326*   PCO2 43.9   PO2 70*   HCO3 23.0*   POCSATURATED 92*   BE -3         Chest Imaging:   Chest x-ray significant for stable diffuse bilateral interstitial attenuation which could represent pulmonary edema or infection.      ASSESSMENT & RECOMMENDATIONS     Neuro/Psych  Patient is currently intubated  Currently Sedated on Propofol and Midazolam  RASS goal: -3  Current RASS: -5 unarousable, no response to voice or physical stimulation  Will stop midazolam due to oversedation risk and wean propofol as able to      CV  #Afib  Patient with episode of Afib with RVR overnight  Patient requiring IV diltiazem, digoxin, and amiodarone drip to convert  Patient currently in NSR  Continue amio drip until bag completion and start PO amiodarone  Monitor for EKG changes    #Essential HTN  Hold home amlodipine      Pulm  #Shock  Shock likely 2/2 to post-viral PNA complicated by staph bacteremia  Patient able to weaned off levophed earlier today  Last BP: 104/58  Goal MAP >65  De-escalate Abx to cefazolin due to Cx growing staph  Consider SANTA to further assess for CV vegetations    #Acute hypoxemic respiratory failure  Etiologies include pulmonary edema from HFpEF vs COPD exacerbation  Patient now complicated by sepsis  Recent ECHO with EF 55%, Mild right ventricular enlargement with normal right ventricular systolic function  ABG improving, latest ABG with pH 7.326, pCO2: 43.9, HCO3: 23.0  Continue intubation and vent support  Continue Duonebs and steroid course  Continue Cefazolin  Wean vent settings and SBT once appropriate       FEN/GI  Diet: NPO  Begin TF given intubation  Stress ulcer prophylaxis: Protonix  Recommend nutrition consult for tube feeds      RENAL  UOP: 72cc , In: 955.9cc, net +883.9cc in last 24 hrs    BUN/Cr: 71/3.6, baseline 1.3  Continue to monitor renal status and urine output    #NADIYA  Likely 2/2 acute tubular necrosis  Septic shock/DKA  Renally toxic medications should be discontinued  Vanc d/c and Zosyn de-escalated to Cefazolin  Patient given 1x 80mg lasix  Will continue to monitor fluid status and electrolytes     Heme  H/H 14.1/42.7; stable  WBC 10.38  DVT prophylaxis: Lovenox    Endo  #DKA  Patient with significantly elevated BG, acidosis on admit  Continue insulin drip as gap is opened  Increased long-acting detemir to 30u BID  Wean insulin drip as able  Goal glucose 140-180    ID  #Sepsis 2/2 staph bacteremia  Currently on Cefazolin  Continue antibiotics as above  Repeat Cx pending  Consider SANTA for vegetations  ID consulted, appreciate recs      Gurpreet Rao MD PGY-2  [facility]   11/10/2022 3:15 PM     Pt seen and examined with Pulmonary/Critical Care team. Critical Care time was spent validating the history and physical exam, reviewing the lab and imaging results, and  discussing the care of the patient with the bedside nurse. The following additional comments are made:    Shock has improved since yesterday. Creatinine appears to be plateuing. We are de-escalating abx.  Needs better glycemic control.  Fails SBT today.  Still has significant flow limitation.    Critical Care time 45 minutes    Darrius Felton MD  Phone [phone]

## 2022-11-10 NOTE — ASSESSMENT & PLAN NOTE
- suspect due to viral PNA  - COVID and flu negative on admission; repeat with RSV  - on BSABx for now given acute decompensation  - empiric tamiflu  - staph bacteremia; possibly post-viral PNA as source

## 2022-11-10 NOTE — ASSESSMENT & PLAN NOTE
Workup in ED remarkable for hyperglycemia, HAGMA, elevated beta hydroxy.   New diagnosis of DM  DKA protocol  -A1c greater than 14; will need insulin at time of discharge  -reopened gap; transition back to insulin gtt  - continue detemir 30u bid

## 2022-11-10 NOTE — CONSULTS
"  [facility] Infectious Diseases Consult Note    Primary Attending Physician: Manuel LI  Consultant Attending: Stanton LI      Reason for Consult:     "Bacteremia"    Assessment and Recommendations:      Jose Luis Rendon is a 67 y.o. male with:    Sepsis with bacteremia of indeterminate source  -Recommend switching to renally dosed cefazolin as IV monotherapy to cover MSSA and strep species. 1g IV q8h; may adjust from there based on urine output vs need for dialysis  -Continue to follow cultures and speciation.  -Recommend SANTA to further evaluate heart function and rule out vegetation in addition to TTE already performed  -Will likely require 4 weeks of antibiotic therapy.      Thank you for allowing us to participate in the care of this patient. Please contact me if you have any questions regarding this consult. We will continue to follow.    Toby Torres MD  [facility]  ID service       Subjective:      History of Present Illness:  Jose Luis Rendon is a 67 y.o. male w/ hx of COPD,  HTN< VCA, bell's palsy, tobacco use (45 pack year, stopped 2015) who presents with SOB, nausea, vomiting, chest pain for 4 days. ID consulted now for staph bacteremia. Per chart review patient described edema to lower extremities and 1 pillow orthopnea but no cough or fever on presentation. Patient developed acute hypoxic respiratory failure and required ICU admission with ventilator support; also required insulin drip for mild DKA. Overnight, patient developed A-fib with RVR and required diltiazem; transitioned to amiodarone and received dose of digoxin as well. Interview limited by patient mental status (medically sedated on ventilator)    Past Medical History:  Past Medical History:   Diagnosis Date    Anxiety 8/5/2016    Bell's palsy     Benign essential HTN 4/27/2018    Lacunar infarction     Remote finding seen on cranial imaging during acute workup for Bell's palsy/[facility] White    Pneumonia     age 5    Psoriasis        Past Surgical " History:  Past Surgical History:   Procedure Laterality Date    BACK SURGERY N/A 1998    bulging L5    TONSILLECTOMY         Allergies:  Review of patient's allergies indicates:   Allergen Reactions    Bee sting [allergen ext-venom-honey bee] Anaphylaxis and Swelling    Venom-yellow jacket Swelling       Medications:   In-Hospital Scheduled Medications:   albuterol-ipratropium  3 mL Nebulization Q6H WAKE    aspirin  81 mg Oral Daily    atorvastatin  40 mg Oral QHS    enoxaparin  30 mg Subcutaneous Daily    insulin detemir U-100  30 Units Subcutaneous BID    methylPREDNISolone sodium succinate injection  60 mg Intravenous Q8H    mupirocin   Nasal BID    [START ON 11/11/2022] oseltamivir  30 mg Per OG tube Daily    pantoprazole  40 mg Intravenous Daily    piperacillin-tazobactam (ZOSYN) IVPB  4.5 g Intravenous Q8H    polyethylene glycol  17 g Per NG tube Daily    senna-docusate 8.6-50 mg  1 tablet Oral BID    sodium chloride 0.9%  500 mL Intravenous Once      In-Hospital PRN Medications:  acetaminophen, aluminum-magnesium hydroxide-simethicone, dextrose 10%, dextrose 10%, dextrose 10%, dextrose 10%, glucagon (human recombinant), glucose, glucose, influenza 65up-adj, magnesium oxide, magnesium oxide, magnesium sulfate IVPB, melatonin, naloxone, ondansetron, oxyCODONE-acetaminophen, potassium bicarbonate, potassium bicarbonate, potassium bicarbonate, potassium chloride, potassium, sodium phosphates, potassium, sodium phosphates, simethicone, sodium chloride 0.9%, sodium chloride 0.9%, Pharmacy to dose Vancomycin consult **AND** vancomycin - pharmacy to dose   In-Hospital IV Infusion Medications:   amiodarone in dextrose 5% 0.5 mg/min (11/10/22 1205)    insulin regular 1 units/mL infusion orderable (DKA) 11 Units/hr (11/10/22 1031)    NORepinephrine bitartrate-D5W Stopped (11/10/22 0746)    propofoL 20 mcg/kg/min (11/10/22 1205)      Home Medications:  Prior to Admission medications    Medication Sig Start Date End Date  Taking? Authorizing Provider   amLODIPine (NORVASC) 5 MG tablet Take 1 tablet (5 mg total) by mouth once daily. 10/28/21  Yes Deric Armstrong MD   aspirin (ECOTRIN) 81 MG EC tablet Take 81 mg by mouth.   Yes Historical Provider   atorvastatin (LIPITOR) 40 MG tablet TAKE 1 TABLET(40 MG) BY MOUTH EVERY DAY  Patient taking differently: Take 40 mg by mouth once daily. 22  Yes Antonette Valerio MD   EScitalopram oxalate (LEXAPRO) 10 MG tablet TAKE 1 TABLET(10 MG) BY MOUTH EVERY DAY  Patient taking differently: Take 10 mg by mouth once daily. 10/25/22  Yes Deric Armstrong MD       Family History:  Family History   Problem Relation Age of Onset    Lung disease Mother     Glaucoma Mother     Cataracts Mother     Bladder Cancer Father     Alzheimer's disease Father     Glaucoma Father     Prostate cancer Father     Rheum arthritis Sister     Lupus Sister     Coronary artery disease Maternal Grandmother         nonpremature    Colon cancer Neg Hx        Social History:  Social History     Tobacco Use    Smoking status: Former     Packs/day: 1.00     Years: 45.00     Pack years: 45.00     Types: Cigarettes     Quit date: 2015     Years since quittin.8   Substance Use Topics    Alcohol use: Yes     Alcohol/week: 0.0 standard drinks     Comment: rare    Drug use: No       ROS Unable to perform 2/2 mental status, on ventilator       Objective:   Last 24 Hour Vital Signs:  BP  Min: 72/50  Max: 149/79  Temp  Av.4 °F (36.9 °C)  Min: 96.6 °F (35.9 °C)  Max: 99.7 °F (37.6 °C)  Pulse  Av.2  Min: 80  Max: 158  Resp  Av.5  Min: 20  Max: 28  SpO2  Av.5 %  Min: 93 %  Max: 100 %  I/O last 3 completed shifts:  In: 3720.7 [I.V.:638.8; NG/GT:180; IV Piggyback:2901.9]  Out: 1185 [Urine:1185]    Physical Exam  Constitutional:       Appearance: He is ill-appearing.      Comments: Sedated on ventilator   HENT:      Head: Atraumatic.      Right Ear: External ear normal.      Left Ear: External ear  normal.      Mouth/Throat:      Comments: ET tube in place  Cardiovascular:      Rate and Rhythm: Normal rate and regular rhythm.      Heart sounds: Normal heart sounds.   Pulmonary:      Comments: Air movement bilateral lungs on auscultation  Abdominal:      General: There is no distension.      Palpations: Abdomen is soft.   Musculoskeletal:         General: No deformity.   Skin:     General: Skin is warm.   Neurological:      Comments: Medically sedated. Unable to perform full neurological assessment       Vent Mode: A/CMV-VC  Oxygen Concentration (%):  [] 55  Resp Rate Total:  [16 br/min-29 br/min] 16 br/min  Vt Set:  [450 mL-500 mL] 500 mL  PEEP/CPAP:  [1.8 cmH20-10.4 cmH20] 1.8 cmH20  Mean Airway Pressure:  [8.4 biU65-78.6 cmH20] 14.6 cmH20      Laboratory Results:  Most Recent Data:  CBC:   Lab Results   Component Value Date    WBC 10.38 11/10/2022    HGB 14.1 11/10/2022    HCT 42.7 11/10/2022    PLT 58 (L) 11/10/2022    MCV 92 11/10/2022    RDW 12.7 11/10/2022     BMP:   Lab Results   Component Value Date     (L) 11/10/2022    K 4.0 11/10/2022     11/10/2022    CO2 14 (L) 11/10/2022    BUN 79 (H) 11/10/2022     (H) 11/10/2022    CALCIUM 8.3 (L) 11/10/2022    MG 2.9 (H) 11/10/2022    PHOS 3.9 11/09/2022     LFTs:   Lab Results   Component Value Date    PROT 5.9 (L) 11/09/2022    ALBUMIN 2.0 (L) 11/09/2022    BILITOT 1.2 (H) 11/09/2022    AST 59 (H) 11/09/2022    ALKPHOS 131 11/09/2022    ALT 58 (H) 11/09/2022     Coags: No results found for: INR, PROTIME, PTT  FLP:   Lab Results   Component Value Date    CHOL 97 (L) 11/08/2022    HDL 13 (L) 11/08/2022    LDLCALC 40.4 (L) 11/08/2022    TRIG 218 (H) 11/08/2022    CHOLHDL 13.4 (L) 11/08/2022     DM:   Lab Results   Component Value Date    HGBA1C >14.0 (H) 11/07/2022    HGBA1C 7.3 (H) 10/28/2021    LDLCALC 40.4 (L) 11/08/2022    CREATININE 3.8 (H) 11/10/2022     Thyroid:   Lab Results   Component Value Date    TSH 0.506 11/07/2022     FREET4 1.04 11/07/2022     Anemia: No results found for: IRON, TIBC, FERRITIN, [id number], FOLATE  Cardiac:   Lab Results   Component Value Date    TROPONINI 0.010 11/07/2022     (H) 11/09/2022     Urinalysis:   Lab Results   Component Value Date    LABURIN STAPHYLOCOCCUS AUREUS  > 100,000 cfu/ml   (A) 10/28/2021    LABURIN (A) 10/28/2021     VIRIDANS STREPTOCOCCUS GROUP  > 100,000 cfu/ml  Susceptibility testing not routinely performed      COLORU Yellow 11/09/2022    SPECGRAV 1.015 11/09/2022    NITRITE Negative 11/09/2022    KETONESU 1+ (A) 11/09/2022    UROBILINOGEN Negative 11/09/2022       Trended Lab Data:  Recent Labs   Lab 11/07/22  1118 11/07/22  1232 11/08/22  0410 11/08/22  0825 11/09/22  0601 11/09/22  1645 11/09/22  2002 11/09/22  2007 11/09/22  2352 11/10/22  0426 11/10/22  0758 11/10/22  0936   WBC  --    < > 15.43*  --  12.44  --   --   --   --  10.38  --   --    HGB  --    < > 14.7  --  15.5  --   --   --   --  14.1  --   --    HCT  --    < > 44.7  --  47.7  --  45  --   --  42.7  --   --    PLT  --    < > 140*  --  86*  --   --   --   --  58*  --   --    MCV  --    < > 92  --  94  --   --   --   --  92  --   --    RDW  --    < > 12.7  --  12.9  --   --   --   --  12.7  --   --    *   < > 131*  131*   < > 127*   < >  --  131* 132* 134* 135*  --    K 4.3   < > 4.3  4.1   < > 3.8   < >  --  4.0 3.5 3.6 6.2* 4.0   CL 97   < > 99  99   < > 95   < >  --  96 98 100 107  --    CO2 12*   < > 17*  19*   < > 18*   < >  --  18* 20* 17* 14*  --    BUN 28*   < > 33*  34*   < > 50*   < >  --  62* 64* 66* 79*  --    *   < > 265*  267*   < > 454*   < >  --  545*  545* 431* 264* 293*  --    PROT 7.4  --   --   --   --   --   --  5.9*  --   --   --   --    ALBUMIN 3.2*  --   --   --   --   --   --  2.0*  --   --   --   --    BILITOT 1.6*  --   --   --   --   --   --  1.2*  --   --   --   --    AST 27  --   --   --   --   --   --  59*  --   --   --   --    ALKPHOS 97  --   --   --   --   --    --  131  --   --   --   --    ALT 47*  --   --   --   --   --   --  58*  --   --   --   --     < > = values in this interval not displayed.         Microbiology Data:  Microbiology Results (last 7 days)       Procedure Component Value Units Date/Time    Blood culture [[id number]] Collected: 11/09/22 1719    Order Status: Completed Specimen: Blood Updated: 11/10/22 0915     Blood Culture, Routine No Growth to date    Blood culture [[id number]] Collected: 11/09/22 1719    Order Status: Completed Specimen: Blood Updated: 11/10/22 0915     Blood Culture, Routine No Growth to date    Blood culture [[id number]]  (Abnormal) Collected: 11/09/22 0337    Order Status: Completed Specimen: Blood from Antecubital, Left Updated: 11/10/22 0914     Blood Culture, Routine Gram stain peds bottle: Gram positive cocci in clusters resembling Staph      Gram positive cocci in chains resembling Strep      Results called to and read back by:Lila Panda RN 11/09/2022  20:00      STAPHYLOCOCCUS AUREUS  [facility] consult [facility].  For susceptibility see order # [id number]      Narrative:      Please draw 10 minutes apart, from 2 separate venous sites.    Blood culture [[id number]]  (Abnormal) Collected: 11/09/22 0337    Order Status: Completed Specimen: Blood Updated: 11/10/22 0911     Blood Culture, Routine Gram stain nohemi bottle: Gram positive cocci in chains resembling Strep      Results called to and read back by:Alexus Kang RN 11/09/2022  17:02      Gram stain aer bottle: Gram positive cocci in clusters resembling Staph      Results called to and read back by:Alexus Kang RN 11/09/2022  18:41      Gram stain aer bottle: Gram positive cocci in chains resembling Strep      STREPTOCOCCUS AGALACTIAE (GROUP B)  Susceptibility pending  Beta-hemolytic streptococci are routinely susceptible to   penicillins,cephalosporins and carbapenems.        STAPHYLOCOCCUS AUREUS  Susceptibility pending  ID consult  required at [facility],[place] and [facility] locations.      Culture, Respiratory with Gram Stain [[id number]] Collected: 11/09/22 1346    Order Status: Completed Specimen: Respiratory from Tracheal Aspirate Updated: 11/10/22 0854     Respiratory Culture Insufficient incubation, culture in progress     Gram Stain (Respiratory) <10 epithelial cells per low power field.     Gram Stain (Respiratory) Rare WBC's     Gram Stain (Respiratory) No organisms seen    Culture, Respiratory with Gram Stain [[id number]] Collected: 11/09/22 1346    Order Status: Completed Specimen: Respiratory from Tracheal Aspirate Updated: 11/10/22 0854     Respiratory Culture Insufficient incubation, culture in progress     Gram Stain (Respiratory) <10 epithelial cells per low power field.     Gram Stain (Respiratory) Rare WBC's     Gram Stain (Respiratory) No organisms seen    Urine Culture High Risk [[id number]] Collected: 11/09/22 1817    Order Status: Sent Specimen: Urine, Catheterized Updated: 11/10/22 0220    MRSA/SA Rapid ID by PCR from Blood culture [[id number]]  (Abnormal) Collected: 11/09/22 2001    Order Status: Completed Updated: 11/09/22 2110     Staph aureus ID by PCR Positive     MRSA ID by PCR Negative    Narrative:      Please draw 10 minutes apart, from 2 separate venous sites.    MRSA/SA Rapid ID by PCR from Blood culture [[id number]]  (Abnormal) Collected: 11/09/22 0337    Order Status: Completed Updated: 11/09/22 2000     Staph aureus ID by PCR Positive     MRSA ID by PCR Negative              Antimicrobials:  Vanc and Zosyn  Oseltamivir      Other Results:  Radiology:  X-Ray Chest 1 View    Result Date: 11/9/2022  EXAMINATION: XR CHEST 1 VIEW CLINICAL HISTORY: ETT placement; TECHNIQUE: Single frontal view of the chest was performed. COMPARISON: 11/08/2022 FINDINGS: Endotracheal tube tip projects approximately 5 cm above the mamie.  Enteric tube courses through the left upper quadrant however extends past field of view.  Mediastinal structures are midline.  Stable cardiomediastinal silhouette.  Aortic calcification. Stable diffuse bilateral interstitial attenuation which could represent pulmonary edema or infection.  No new focal airspace opacity.  Slightly increased obscuration of the left hemidiaphragm and costophrenic angle suggestive for slightly increased volume left pleural effusion and atelectasis.  No pneumothorax.     As above. Electronically signed by: Sathish Candelaria Date:    11/09/2022 Time:    08:55    X-Ray Chest 1 View    Result Date: 11/7/2022  EXAMINATION: XR CHEST 1 VIEW CLINICAL HISTORY: Shortness of breath TECHNIQUE: Single frontal view of the chest was performed. COMPARISON: 09/18/2017. FINDINGS: Monitoring EKG leads are present.  The trachea is unremarkable.  There are calcifications of the aortic knob.  The cardiomediastinal silhouette is within normal limits.  There is no evidence of free air beneath the hemidiaphragms.  There are no pleural effusions.  There is no evidence of a pneumothorax.  There is no evidence of pneumomediastinum.  There are subtle interstitial opacities with basilar predominance.  There are degenerative changes in the osseous structures.     Subtle bibasilar interstitial opacities.  No focal consolidation. Electronically signed by: Wade Heredia MD Date:    11/07/2022 Time:    11:09    X-Ray Abdomen AP 1 View    Result Date: 11/9/2022  EXAMINATION: XR ABDOMEN AP 1 VIEW CLINICAL HISTORY: abdominal distension; TECHNIQUE: AP View(s) of the abdomen was performed. COMPARISON: None FINDINGS: Single-view abdomen supine. Air and stool is seen within the large bowel and projected over the rectum noting moderate stool throughout the colon.  No focally dilated small bowel loops.  There are degenerative changes of the osseous structures.  No findings to suggest pneumatosis.  There are no calcifications to convincingly suggest nephrolithiasis or cholelithiasis.     1. Moderate stool in the colon,  possibly reflecting constipation. Electronically signed by: Daniel Montgomery MD Date:    11/09/2022 Time:    06:02    X-Ray Chest AP Portable    Result Date: 11/8/2022  EXAMINATION: XR CHEST AP PORTABLE CLINICAL HISTORY: duoneb; TECHNIQUE: Single frontal view of the chest was performed. COMPARISON: 11/07/2022 FINDINGS: Cardiac monitoring leads overlie the chest.  The cardiomediastinal silhouette is unchanged in size and configuration.  The lungs are symmetrically expanded with diffuse coarse interstitial attenuation.  There are persistent bibasilar interstitial opacities.  There is increased opacity in the left lower lung and blunting of the left costophrenic angle which could reflect atelectatic/consolidative change and possible pleural fluid.  No new or enlarging pneumothorax.     Please see above. Electronically signed by: Theresa Benson MD Date:    11/08/2022 Time:    22:14    Echo    Result Date: 11/8/2022  · The left ventricle is normal in size with mild concentric hypertrophy and normal systolic function. · The estimated ejection fraction is 55%. · Indeterminate left ventricular diastolic function. · Mild right ventricular enlargement with normal right ventricular systolic function. · Normal central venous pressure (3 mmHg). · The estimated PA systolic pressure is 29 mmHg. · Overall the study quality was technically difficult      X-ray Abdomen for NG Tube Placement (Nursing should notify Radiology after placement)    Result Date: 11/9/2022  EXAMINATION: XR NON-RADIOLOGIST PERFORMED NG/GASTRIC TUBE CHECK CLINICAL HISTORY: OGT placement; TECHNIQUE: AP view of the upper abdomen COMPARISON: 11/09/2022 FINDINGS: Enteric tube tip and side port projects over the stomach.  No evidence of large volume free intraperitoneal air.  Partial visualization of gaseous distension of bowel.  Please see the same-day and similar time stamp chest radiograph report for thoracic assessment.     As above. Electronically signed  by: Sathish Candelaria Date:    11/09/2022 Time:    08:57

## 2022-11-10 NOTE — PROGRESS NOTES
Pharmacokinetic Assessment Follow Up: IV Vancomycin    Vancomycin serum concentration assessment(s):    The random level was drawn correctly and can be used to guide therapy at this time. The measurement is below the desired definitive target range of 15 to 20 mcg/mL.    Vancomycin Regimen Plan:    Give 1,000 mg IV Vancomycin x 1 dose.   Re-dose when the random level is less than 20 mcg/mL, next level to be drawn at 0400 on 11/11    Drug levels (last 3 results):  Recent Labs   Lab Result Units 11/10/22  0426   Vancomycin, Random ug/mL 12.8       Pharmacy will continue to follow and monitor vancomycin.    Please contact pharmacy at extension [phone] for questions regarding this assessment.    Thank you for the consult,   Paula Solorzano       Patient brief summary:  Jose Luis Rendon is a 67 y.o. male initiated on antimicrobial therapy with IV Vancomycin for treatment of lower respiratory infection    The patient's current regimen is pulse dosing    Drug Allergies:   Review of patient's allergies indicates:   Allergen Reactions    Bee sting [allergen ext-venom-honey bee] Anaphylaxis and Swelling    Venom-yellow jacket Swelling       Actual Body Weight:   90 kg    Renal Function:   Estimated Creatinine Clearance: 24.6 mL/min (A) (based on SCr of 3.2 mg/dL (H)).,     Dialysis Method (if applicable):  N/A    CBC (last 72 hours):  Recent Labs   Lab Result Units 11/07/22  1238 11/08/22  0410 11/09/22  0601 11/10/22  0426   WBC K/uL 12.19 15.43* 12.44 10.38   Hemoglobin g/dL 16.2 14.7 15.5 14.1   Hemoglobin A1C % >14.0*  --   --   --    Hematocrit % 49.7 44.7 47.7 42.7   Platelets K/uL 163 140* 86* 58*   Gran % % 79.7* 84.0* 84.0* 83.9*   Lymph % % 6.3* 3.0* 10.0* 6.1*   Mono % % 13.1 12.0 0.0* 6.6   Eosinophil % % 0.0 0.0 0.0 0.6   Basophil % % 0.2 0.0 0.0 0.4   Differential Method  Automated Manual Manual Automated       Metabolic Panel (last 72 hours):  Recent Labs   Lab Result Units 11/07/22  1047 11/07/22  1118  11/07/22  1239 11/07/22  1652 11/07/22  2022 11/08/22  0024 11/08/22  0410 11/08/22  0825 11/08/22  1244 11/09/22  0358 11/09/22  0601 11/09/22  1645 11/09/22  1721 11/09/22  1815 11/09/22 2007 11/09/22  2352 11/10/22  0426   Sodium mmol/L  --  131* 133* 132* 132*  132* 132* 131*  131* 137 132*  --  127* 130*  --   --  131* 132* 134*   Sodium, Urine mmol/L  --   --   --   --   --   --   --   --   --   --   --   --  <20*  --   --   --   --    Potassium mmol/L  --  4.3 4.1 3.8 3.5  3.5 4.2 4.3  4.1 3.9 4.3  --  3.8 4.4  --  4.1 4.0 3.5 3.6   Chloride mmol/L  --  97 96 98 99  99 97 99  99 96 97  --  95 95  --   --  96 98 100   CO2 mmol/L  --  12* 16* 17* 16*  16* 19* 17*  19* 25 24  --  18* 21*  --   --  18* 20* 17*   Glucose mg/dL  --  586* 468* 346* 264*  264* 288* 265*  267* 246* 242*  --  454* 552*  547*  --   --  545*  545* 431* 264*   Glucose, UA  4+*  --   --   --   --   --   --   --   --  4+*  --   --   --   --   --   --   --    BUN mg/dL  --  28* 26* 29* 29*  29* 30* 33*  34* 35* 38*  --  50* 58*  --   --  62* 64* 66*   Creatinine mg/dL  --  1.4 1.5* 1.4 1.3  1.3 1.5* 1.6*  1.6* 1.8* 2.0*  --  2.3* 2.8*  --   --  3.0* 3.0* 3.2*   Albumin g/dL  --  3.2*  --   --   --   --   --   --   --   --   --   --   --   --  2.0*  --   --    Total Bilirubin mg/dL  --  1.6*  --   --   --   --   --   --   --   --   --   --   --   --  1.2*  --   --    Alkaline Phosphatase U/L  --  97  --   --   --   --   --   --   --   --   --   --   --   --  131  --   --    AST U/L  --  27  --   --   --   --   --   --   --   --   --   --   --   --  59*  --   --    ALT U/L  --  47*  --   --   --   --   --   --   --   --   --   --   --   --  58*  --   --    Magnesium mg/dL  --   --   --   --   --   --  2.2  --   --   --  2.6  --   --   --  3.0*  --  2.9*   Phosphorus mg/dL  --   --   --   --  2.0*  --  2.8  --   --   --   --   --   --   --  3.9  --   --        Vancomycin Administrations:  vancomycin given in the last 96 hours                      vancomycin (VANCOCIN) 1,750 mg in dextrose 5 % 500 mL IVPB ()  Restarted 11/09/22 0436     1,750 mg New Bag  0359                    Microbiologic Results:  Microbiology Results (last 7 days)       Procedure Component Value Units Date/Time    Urine Culture High Risk [[id number]] Collected: 11/09/22 1817    Order Status: Sent Specimen: Urine, Catheterized Updated: 11/10/22 0220    Blood culture [[id number]] Collected: 11/09/22 1719    Order Status: Sent Specimen: Blood Updated: 11/10/22 0150    Blood culture [[id number]] Collected: 11/09/22 1719    Order Status: Sent Specimen: Blood Updated: 11/10/22 0150    Culture, Respiratory with Gram Stain [[id number]] Collected: 11/09/22 1346    Order Status: Completed Specimen: Respiratory from Tracheal Aspirate Updated: 11/09/22 2145     Gram Stain (Respiratory) <10 epithelial cells per low power field.     Gram Stain (Respiratory) Rare WBC's     Gram Stain (Respiratory) No organisms seen    Culture, Respiratory with Gram Stain [[id number]] Collected: 11/09/22 1346    Order Status: Completed Specimen: Respiratory from Tracheal Aspirate Updated: 11/09/22 2139     Gram Stain (Respiratory) <10 epithelial cells per low power field.     Gram Stain (Respiratory) Rare WBC's     Gram Stain (Respiratory) No organisms seen    MRSA/SA Rapid ID by PCR from Blood culture [[id number]]  (Abnormal) Collected: 11/09/22 2001    Order Status: Completed Updated: 11/09/22 2110     Staph aureus ID by PCR Positive     MRSA ID by PCR Negative    Narrative:      Please draw 10 minutes apart, from 2 separate venous sites.    Blood culture [[id number]] Collected: 11/09/22 0337    Order Status: Completed Specimen: Blood Updated: 11/09/22 2011     Blood Culture, Routine Gram stain nohemi bottle: Gram positive cocci in chains resembling Strep      Results called to and read back by:Alexus Kang RN 11/09/2022  17:02      Gram stain aer bottle: Gram positive cocci in clusters resembling Staph       Results called to and read back by:Alexus Kang RN 11/09/2022  18:41      Gram stain aer bottle: Gram positive cocci in chains resembling Strep    Blood culture [[id number]] Collected: 11/09/22 0337    Order Status: Completed Specimen: Blood from Antecubital, Left Updated: 11/09/22 2001     Blood Culture, Routine Gram stain peds bottle: Gram positive cocci in clusters resembling Staph      Gram positive cocci in chains resembling Strep      Results called to and read back by:Lila Panda RN 11/09/2022  20:00    Narrative:      Please draw 10 minutes apart, from 2 separate venous sites.    MRSA/SA Rapid ID by PCR from Blood culture [[id number]]  (Abnormal) Collected: 11/09/22 0337    Order Status: Completed Updated: 11/09/22 2000     Staph aureus ID by PCR Positive     MRSA ID by PCR Negative

## 2022-11-10 NOTE — EICU
eICU Brief Note:    Labs reviewed.  K: 3.5  RBS: 450    Plan 20 MEq KCL over 2 hours  Insulin infusion dose adjusted    Reduced urrine output  Afib with RVR: rate 125-133/min    Plan: Saline 500 ml bolus, Amiodarone infusion continuing

## 2022-11-10 NOTE — PLAN OF CARE
Patient remains intubated and restrained. No injuries or safety concerns. Vitals stable off of levophed. Adequate sedation with propofol. Poor urinary output. Dr Jackson and Shoaib aware. Family at bedside and aware of plan of care.   Problem: Adult Inpatient Plan of Care  Goal: Optimal Comfort and Wellbeing  Outcome: Ongoing, Progressing     Problem: Diabetes Comorbidity  Goal: Blood Glucose Level Within Targeted Range  Outcome: Ongoing, Progressing     Problem: Respiratory Compromise COPD (Chronic Obstructive Pulmonary Disease)  Goal: Effective Oxygenation and Ventilation  Outcome: Ongoing, Progressing     Problem: Device-Related Complication Risk (Mechanical Ventilation, Invasive)  Goal: Optimal Device Function  Outcome: Ongoing, Progressing     Problem: Inability to Wean (Mechanical Ventilation, Invasive)  Goal: Mechanical Ventilation Liberation  Outcome: Ongoing, Progressing     Problem: Ventilator-Induced Lung Injury (Mechanical Ventilation, Invasive)  Goal: Absence of Ventilator-Induced Lung Injury  Outcome: Ongoing, Progressing     Problem: Device-Related Complication Risk (Artificial Airway)  Goal: Optimal Device Function  Outcome: Ongoing, Progressing     Problem: Skin and Tissue Injury (Artificial Airway)  Goal: Absence of Device-Related Skin or Tissue Injury  Outcome: Ongoing, Progressing     Problem: Infection  Goal: Absence of Infection Signs and Symptoms  Outcome: Ongoing, Progressing     Problem: Fall Injury Risk  Goal: Absence of Fall and Fall-Related Injury  Outcome: Ongoing, Progressing     Problem: Restraint, Nonbehavioral (Nonviolent)  Goal: Absence of Harm or Injury  Outcome: Ongoing, Progressing     Problem: Infection (Pneumonia)  Goal: Resolution of Infection Signs and Symptoms  Outcome: Ongoing, Progressing     Problem: Respiratory Compromise (Pneumonia)  Goal: Effective Oxygenation and Ventilation  Outcome: Ongoing, Progressing     Problem: Bleeding (Sepsis/Septic Shock)  Goal:  Absence of Bleeding  Outcome: Ongoing, Progressing     Problem: Glycemic Control Impaired (Sepsis/Septic Shock)  Goal: Blood Glucose Level Within Desired Range  Outcome: Ongoing, Progressing     Problem: Infection Progression (Sepsis/Septic Shock)  Goal: Absence of Infection Signs and Symptoms  Outcome: Ongoing, Progressing

## 2022-11-10 NOTE — SUBJECTIVE & OBJECTIVE
Interval History:  eventful night with development of Afib w/ RVR, treated with IV diltiazem, c/b hypotension. Transitioned to amiodarone and given dose digoxin. Associated shock requiring brief use of norepinephrine. Cultures growing staph. Has converted to NSR this am. Discussed with family at the bedside.    Review of Systems   Unable to perform ROS: Intubated   Objective:     Vital Signs (Most Recent):  Temp: 97.7 °F (36.5 °C) (11/10/22 1130)  Pulse: 100 (11/10/22 1130)  Resp: (!) 21 (11/10/22 1130)  BP: 138/63 (11/10/22 1130)  SpO2: (!) 94 % (11/10/22 1130)   Vital Signs (24h Range):  Temp:  [96.6 °F (35.9 °C)-99.8 °F (37.7 °C)] 97.7 °F (36.5 °C)  Pulse:  [] 100  Resp:  [20-28] 21  SpO2:  [93 %-100 %] 94 %  BP: ()/(50-79) 138/63     Weight: 90 kg (198 lb 6.6 oz)  Body mass index is 28.47 kg/m².    Intake/Output Summary (Last 24 hours) at 11/10/2022 1141  Last data filed at 11/10/2022 1100  Gross per 24 hour   Intake 2470.58 ml   Output 747 ml   Net 1723.58 ml        Physical Exam  Constitutional:       Appearance: He is ill-appearing. He is not toxic-appearing.      Interventions: He is sedated and intubated.   HENT:      Head: Normocephalic and atraumatic.      Mouth/Throat:      Mouth: Mucous membranes are moist.   Eyes:      Conjunctiva/sclera: Conjunctivae normal.      Pupils: Pupils are equal, round, and reactive to light.   Cardiovascular:      Rate and Rhythm: Normal rate and regular rhythm.      Heart sounds: Normal heart sounds.   Pulmonary:      Effort: Pulmonary effort is normal. He is intubated.      Breath sounds: Rales (bibasilar) present.   Abdominal:      General: There is no distension.      Palpations: Abdomen is soft.      Tenderness: There is no abdominal tenderness. There is no guarding.   Musculoskeletal:         General: Normal range of motion.      Cervical back: Normal range of motion and neck supple.      Right lower leg: Edema present.      Left lower leg: Edema present.    Skin:     General: Skin is warm and dry.   Neurological:      General: No focal deficit present.      Sensory: No sensory deficit.      Comments: Facial droop   Psychiatric:      Comments: Unable to assess       Significant Labs: All pertinent labs within the past 24 hours have been reviewed.    Significant Imaging: I have reviewed all pertinent imaging results/findings within the past 24 hours.

## 2022-11-10 NOTE — PROGRESS NOTES
Pharmacist Renal Dose Adjustment Note    Jose Luis Rendon is a 67 y.o. male being treated with the medication enoxaparin    Patient Data:    Vital Signs (Most Recent):  Temp: 97.7 °F (36.5 °C) (11/10/22 1130)  Pulse: 100 (11/10/22 1139)  Resp: (!) 21 (11/10/22 1139)  BP: 138/63 (11/10/22 1130)  SpO2: 95 % (11/10/22 1139)   Vital Signs (72h Range):  Temp:  [96.1 °F (35.6 °C)-101 °F (38.3 °C)]   Pulse:  []   Resp:  [11-56]   BP: ()/(50-99)   SpO2:  [85 %-100 %]      Recent Labs   Lab 11/09/22  2352 11/10/22  0426 11/10/22  0758   CREATININE 3.0* 3.2* 3.8*     Serum creatinine: 3.8 mg/dL (H) 11/10/22 0758  Estimated creatinine clearance: 20.7 mL/min (A)    Medication:enoxaparin dose: 40 mg frequency Q 24 hrs will be changed to medication: enoxaparin dose:30 mg frequency:Q 24 hrs     Pharmacist's Name: Kristal Mckinley  Pharmacist's Extension: [phone]

## 2022-11-11 PROBLEM — N17.9 AKI (ACUTE KIDNEY INJURY): Status: ACTIVE | Noted: 2022-01-01

## 2022-11-11 PROBLEM — I33.0 ACUTE BACTERIAL ENDOCARDITIS: Status: ACTIVE | Noted: 2022-01-01

## 2022-11-11 PROBLEM — J15.9 BACTERIAL PNEUMONIA: Status: ACTIVE | Noted: 2022-01-01

## 2022-11-11 PROBLEM — R07.9 CHEST PAIN OF UNCERTAIN ETIOLOGY: Status: ACTIVE | Noted: 2022-01-01

## 2022-11-11 NOTE — ASSESSMENT & PLAN NOTE
- blood cultures growing staph (appears to be MSSA based off PCR) and strep  - repeat blood cultures for clearance  - admission TTE without vegetation; SANTA with vegetation  - on BSABx; de-escalated to cefazolin  - ID consult

## 2022-11-11 NOTE — PROGRESS NOTES
Discussed recent BMP results and insulin drip with Dr. Rao. Per MD, insulin drip will remain off and will cover with sliding scale q4hrs. Will give PRN K+ replacement. MD is aware of elevated creatinine and low UOP; plans for nephrology consult and dialysis tomorrow 11/11. Tube feeds not being initiated due to constipation; holding off until pt has a BM. Will continue with plan of care.

## 2022-11-11 NOTE — CONSULTS
[facility] - Intensive Care  Cardiology  Consult Note    Patient Name: Jose Luis Rendon  MRN: [id number]  Admission Date: 11/7/2022  Hospital Length of Stay: 4 days  Code Status: Full Code   Attending Provider: Jermain Jackson, *   Consulting Provider: Merrill Price MD  Primary Care Physician: Deric Armstrong MD  Principal Problem:DKA (diabetic ketoacidosis)    Patient information was obtained from patient, relative(s), past medical records, ER records and primary team.     [facility]  Consult performed by: Merrill Price MD  Consult ordered by: Jermain Jackson MD        Subjective:     Chief Complaint:  Chest pain, atrial fibrillation, bacteremia.    HPI:   11/11/2022: We are consulted for bacteremia, Staph aureus.  He initially presented with shortness of breath and chest pain with DKA, newly diagnosed diabetes on 11/7/2022.  He has a history of hypertension.  He has had negative stress tests in the past.  He complained of chest pain and shortness of breath.  His chest pain was considered as musculoskeletal, reproduced by bending or lifting objects.  He was afebrile on arrival to the ED.    He has subsequently been intubated for hypercapnic respiratory failure.  His x-ray did show interstitial markings increased.  Blood cultures have grown staph aureus.  At time of intubation he was mildly hypotensive.  He was on vasopressors for a brief period of time.  He has developed acute renal failure and has been seen by Nephrology.  He is oliguric.  He developed AFib and is now in sinus rhythm with IV amiodarone.    Andres completed this morning confirms aortic valve vegetation.  There is no aortic regurgitation his ejection fraction and wall motion are normal.  No intracardiac thrombi seen.  Cardiac management discussed with the patient's son today.  He states that the patient has been exposed to rats at his residence.  There is no history of IV drug abuse.      Past Medical History:    Diagnosis Date    Anxiety 2016    Bell's palsy     Benign essential HTN 2018    Lacunar infarction     Remote finding seen on cranial imaging during acute workup for Bell's palsy/[place]    Pneumonia     age 5    Psoriasis        Past Surgical History:   Procedure Laterality Date    BACK SURGERY N/A     bulging L5    TONSILLECTOMY         Review of patient's allergies indicates:   Allergen Reactions    Bee sting [allergen ext-venom-honey bee] Anaphylaxis and Swelling    Venom-yellow jacket Swelling       No current facility-administered medications on file prior to encounter.     Current Outpatient Medications on File Prior to Encounter   Medication Sig    amLODIPine (NORVASC) 5 MG tablet Take 1 tablet (5 mg total) by mouth once daily.    aspirin (ECOTRIN) 81 MG EC tablet Take 81 mg by mouth.    atorvastatin (LIPITOR) 40 MG tablet TAKE 1 TABLET(40 MG) BY MOUTH EVERY DAY (Patient taking differently: Take 40 mg by mouth once daily.)    EScitalopram oxalate (LEXAPRO) 10 MG tablet TAKE 1 TABLET(10 MG) BY MOUTH EVERY DAY (Patient taking differently: Take 10 mg by mouth once daily.)     Family History       Problem Relation (Age of Onset)    Alzheimer's disease Father    Bladder Cancer Father    Cataracts Mother    Coronary artery disease Maternal Grandmother    Glaucoma Mother, Father    Lung disease Mother    Lupus Sister    Prostate cancer Father    Rheum arthritis Sister          Tobacco Use    Smoking status: Former     Packs/day: 1.00     Years: 45.00     Pack years: 45.00     Types: Cigarettes     Quit date: 2015     Years since quittin.8    Smokeless tobacco: Not on file   Substance and Sexual Activity    Alcohol use: Yes     Alcohol/week: 0.0 standard drinks     Comment: rare    Drug use: No    Sexual activity: Yes     Partners: Female     Review of Systems   Reason unable to perform ROS: Intubated sedated.   Objective:     Vital Signs (Most Recent):  Temp: 98.2 °F  (36.8 °C) (11/11/22 1115)  Pulse: (!) 116 (11/11/22 1230)  Resp: (!) 22 (11/11/22 1230)  BP: 108/64 (11/11/22 1230)  SpO2: (!) 93 % (11/11/22 1230)   Vital Signs (24h Range):  Temp:  [97.3 °F (36.3 °C)-98.2 °F (36.8 °C)] 98.2 °F (36.8 °C)  Pulse:  [] 116  Resp:  [18-35] 22  SpO2:  [91 %-97 %] 93 %  BP: ()/(50-75) 108/64     Weight: 94.5 kg (208 lb 5.4 oz)  Body mass index is 29.89 kg/m².    SpO2: (!) 93 %  O2 Device (Oxygen Therapy): ventilator      Intake/Output Summary (Last 24 hours) at 11/11/2022 1241  Last data filed at 11/11/2022 1230  Gross per 24 hour   Intake 1236.16 ml   Output 265 ml   Net 971.16 ml       Lines/Drains/Airways       Drain  Duration                  NG/OG Tube 11/09/22 0805 orogastric 16 Fr. Right mouth 2 days         Urethral Catheter 11/09/22 0815 Non-latex 14 Fr. 2 days              Airway  Duration                  Airway - Non-Surgical 11/09/22 0804 Endotracheal Tube 2 days              Peripheral Intravenous Line  Duration                  Peripheral IV - Single Lumen 11/08/22 2050 20 G Distal;Left;Posterior Forearm 2 days         Peripheral IV - Single Lumen 11/09/22 0500 20 G Posterior;Right Forearm 2 days         Peripheral IV - Single Lumen 11/09/22 0900 18 G Anterior;Distal;Left Upper Arm 2 days         Peripheral IV - Single Lumen 11/10/22 0044 18 G Right Forearm 1 day                    Physical Exam  Constitutional:       Appearance: Normal appearance.   HENT:      Head: Normocephalic and atraumatic.      Nose: Nose normal.      Mouth/Throat:      Mouth: Mucous membranes are moist.      Pharynx: Oropharynx is clear.   Eyes:      Pupils: Pupils are equal, round, and reactive to light.   Cardiovascular:      Rate and Rhythm: Normal rate and regular rhythm.      Pulses:           Carotid pulses are 2+ on the right side and 2+ on the left side.       Radial pulses are 2+ on the right side and 2+ on the left side.   Pulmonary:      Effort: Pulmonary effort is normal.       Breath sounds: Normal breath sounds.   Abdominal:      General: Abdomen is flat. Bowel sounds are normal.      Palpations: Abdomen is soft.   Musculoskeletal:      Cervical back: Normal range of motion and neck supple.   Skin:     General: Skin is warm and dry.      Comments: Small left arm pustule noted       Significant Labs: ABG:   Recent Labs   Lab 11/09/22 2002 11/09/22  2207 11/10/22  0543   PH 7.248* 7.262* 7.326*   PCO2 56.0* 50.6* 43.9   HCO3 24.5 22.8* 23.0*   POCSATURATED 86* 90* 92*   BE -3 -4 -3   , Blood Culture:   Recent Labs   Lab 11/09/22  1719 11/10/22  1627   LABBLOO Gram stain peds bottle: Gram positive cocci in clusters resembling Staph  Positive results previously called 11/10/2022  STAPHYLOCOCCUS AUREUS  ID consult required at [facility] and [facility] locations.  For susceptibility see order # [id number]  *  Gram stain aer bottle: Gram positive cocci in clusters resembling Staph  Positive results previously called 11/10/2022  14:39  STAPHYLOCOCCUS AUREUS  Susceptibility pending  ID consult required at [facility] and [facility] locations.  * No Growth to date  No Growth to date   , BMP:   Recent Labs   Lab 11/09/22 2007 11/09/22  2352 11/10/22  0426 11/10/22  0758 11/10/22  1822 11/11/22  0029 11/11/22  0531   *  545*   < > 264*   < > 298* 247* 290*   *   < > 134*   < > 130* 133* 130*   K 4.0   < > 3.6   < > 3.3* 3.8 4.5   CL 96   < > 100   < > 89* 96 95   CO2 18*   < > 17*   < > 17* 18* 19*   BUN 62*   < > 66*   < > 74* 83* 87*   CREATININE 3.0*   < > 3.2*   < > 5.5* 4.5* 4.9*   CALCIUM 8.6*   < > 8.1*   < > 7.5* 8.0* 8.3*   MG 3.0*  --  2.9*  --   --   --  3.0*    < > = values in this interval not displayed.   , CBC   Recent Labs   Lab 11/10/22  0426 11/11/22  0531   WBC 10.38 14.43*   HGB 14.1 14.3   HCT 42.7 41.0   PLT 58* 90*   , INR No results for input(s): INR, PROTIME in the last 48 hours., Troponin No results for input(s): TROPONINI in the last 48  hours., and All pertinent lab results from the last 24 hours have been reviewed.    Significant Imaging:     Assessment and Plan:     * DKA (diabetic ketoacidosis)  A newly diagnosed condition.  Hemoglobin A1c 14.    Acute bacterial endocarditis  Infectious disease is following.  SANTA confirms vegetation on the aortic valve.    Chest pain of uncertain etiology  There were complaints of chest pain on admission.  He had a negative Lexiscan in 2021.  There was normal ejection fraction without infarct pattern or reversible ischemia noted.  His Electrocardiogram showed right bundle branch block with left axis deviation.  During AFib and tachycardia he did not have marked ischemic ST changes.  There is low suspicion for chest pain related to coronary artery disease based on presentation.    Acute renal failure with acute tubular necrosis superimposed on stage 3a chronic kidney disease  Oliguric picture.  Nephrology following.    Bacteremia due to group B Streptococcus  Staph aureus is growing as well.  Aortic valve vegetation confirmed.    Atrial fibrillation with RVR  New onset during this admission.  He is now in sinus rhythm.  Amiodarone conversion carried out.  Currently he is not on full anticoagulation.  There were no intracardiac thrombi on transesophageal ECHO.    COPD with acute exacerbation  He quit smoking in 2015.  Condition stable.    Acute hypoxemic respiratory failure  He remains intubated at this time.  Sedation was lightened up today and he seems to be with appropriate behavior.      Benign essential HTN  He has a history of hypertension treated with amlodipine as an outpatient.      VTE Risk Mitigation (From admission, onward)         Ordered     enoxaparin injection 30 mg  Daily         11/10/22 1155     IP VTE LOW RISK PATIENT  Once         11/07/22 1231     Place sequential compression device  Until discontinued         11/07/22 1231                Thank you for your consult. I will follow-up with  patient. Please contact us if you have any additional questions.    Merrill Price MD  Cardiology   [facility] - Intensive Care

## 2022-11-11 NOTE — PROGRESS NOTES
"[facility] - Intensive Care  Adult Nutrition  Progress Note    SUMMARY       Recommendations    Recommendation:  1. When medically acceptable, initiate TF of Glucerna 1.5 at 10ml/hr. Advance as tolerated to goal rate of 40ml/hr (while on propofol) which would provide 1440 kcal, 79g protein, & 728ml free water.     2. Monitor glucose and renal labs.     3. RD to continue to follow to monitor nutrition status    Goals:  TF will be started by RD follow up  Nutrition Goal Status: new  Communication of RD Recs: reviewed with RN (Jenise)    Assessment and Plan  Acute hypoxemic respiratory failure  Contributing Nutrition Diagnosis  Inadequate energy intake    Related to (etiology):   intubation    Signs and Symptoms (as evidenced by):   NPO    Interventions:  Collaboration with other providers    Nutrition Diagnosis Status:   New    Malnutrition Assessment  Subcutaneous Fat Loss (Final Summary): well nourished  Muscle Loss Evaluation (Final Summary): well nourished       Reason for Assessment  Reason For Assessment: RD follow-up  Diagnosis:  (DKA)  Relevant Medical History: Volga's palsy, anxiety, lacunar infarction, back surgery  General Information Comments: Educated pt on ADA diet 11/8. Noted pt now intubated on vent. OG tube. TF not yet started 2/2 constipation. Ramon 12-skin intact. No recent weight loss noted. NFPE completed today 11/11-nourished.  Nutrition Discharge Planning: d/c needs to be determined    Nutrition Risk Screen  Nutrition Risk Screen: no indicators present    Nutrition/Diet History  Food Preferences: no Mandaen or cultural food prefs identified  Factors Affecting Nutritional Intake: NPO, on mechanical ventilation    Anthropometrics  Temp: 98.2 °F (36.8 °C)  Height Method: Stated  Height: 5' 10" (177.8 cm)  Height (inches): 70 in  Weight Method: Bed Scale  Weight: 94.5 kg (208 lb 5.4 oz)  Weight (lb): 208.34 lb  Ideal Body Weight (IBW), Male: 166 lb  % Ideal Body Weight, Male (lb): 125.51 %  BMI " (Calculated): 29.9  BMI Grade: 25 - 29.9 - overweight  Usual Body Weight (UBW), k kg (11/7n on admit)  % Usual Body Weight: 107.61  % Weight Change From Usual Weight: 7.39 %     Lab/Procedures/Meds  Pertinent Labs Reviewed: reviewed  Pertinent Labs Comments: Na 130L, BUN 87H, Crea 4.9H, Ca 8.3L, Mg 3.0H  Pertinent Medications Reviewed: reviewed  Pertinent Medications Comments: aspirin, insulin, pantoprazole, senna, amidarone, propofol    Estimated/Assessed Needs  Weight Used For Calorie Calculations: 94.5 kg (208 lb 5.4 oz)  Energy Calorie Requirements (kcal):   Energy Need Method: [facility]  Protein Requirements: 122g (1.3g/kg)  Weight Used For Protein Calculations: 94.5 kg (208 lb 5.4 oz)  Estimated Fluid Requirement Method: RDA Method  RDA Method (mL):   CHO Requirement: 225g    Nutrition Prescription Ordered  Current Diet Order: NPO    Evaluation of Received Nutrient/Fluid Intake  Other Calories (kcal): 430  % Kcal Needs: 22 (propofol)  I/O: 1742/227  Energy Calories Required: not meeting needs  Protein Required: not meeting needs  Fluid Required: not meeting needs  Comments: LBM 11/7  % Intake of Estimated Energy Needs: 0 - 25 %  % Meal Intake: NPO    Nutrition Risk  Level of Risk/Frequency of Follow-up:  (2xweekly)     Monitor and Evaluation  Food and Nutrient Intake: energy intake  Food and Nutrient Adminstration: enteral and parenteral nutrition administration, diet order  Physical Activity and Function: nutrition-related ADLs and IADLs  Anthropometric Measurements: weight  Biochemical Data, Medical Tests and Procedures: electrolyte and renal panel  Nutrition-Focused Physical Findings: overall appearance     Nutrition Follow-Up    RD Follow-up?: Yes

## 2022-11-11 NOTE — ASSESSMENT & PLAN NOTE
New onset during this admission.  He is now in sinus rhythm.  Amiodarone conversion carried out.  Currently he is not on full anticoagulation.  There were no intracardiac thrombi on transesophageal ECHO.

## 2022-11-11 NOTE — SUBJECTIVE & OBJECTIVE
Interval History:  uneventful night. Remains intubated and sedated. SANTA today with vegetations. Had BM this afternoon.    Review of Systems   Unable to perform ROS: Intubated   Objective:     Vital Signs (Most Recent):  Temp: 98.2 °F (36.8 °C) (11/11/22 1115)  Pulse: (!) 123 (11/11/22 1430)  Resp: (!) 30 (11/11/22 1430)  BP: 106/62 (11/11/22 1415)  SpO2: (!) 93 % (11/11/22 1430)   Vital Signs (24h Range):  Temp:  [97.3 °F (36.3 °C)-98.2 °F (36.8 °C)] 98.2 °F (36.8 °C)  Pulse:  [] 123  Resp:  [18-42] 30  SpO2:  [91 %-97 %] 93 %  BP: ()/(50-75) 106/62     Weight: 94.5 kg (208 lb 5.4 oz)  Body mass index is 29.89 kg/m².    Intake/Output Summary (Last 24 hours) at 11/11/2022 1437  Last data filed at 11/11/2022 1400  Gross per 24 hour   Intake 1248.35 ml   Output 305 ml   Net 943.35 ml        Physical Exam  Constitutional:       Appearance: He is ill-appearing. He is not toxic-appearing.      Interventions: He is sedated and intubated.   HENT:      Head: Normocephalic and atraumatic.      Mouth/Throat:      Mouth: Mucous membranes are moist.   Eyes:      Conjunctiva/sclera: Conjunctivae normal.      Pupils: Pupils are equal, round, and reactive to light.   Cardiovascular:      Rate and Rhythm: Normal rate and regular rhythm.      Heart sounds: Normal heart sounds.   Pulmonary:      Effort: Pulmonary effort is normal. He is intubated.      Breath sounds: Rales (bibasilar) present.   Abdominal:      General: There is no distension.      Palpations: Abdomen is soft.      Tenderness: There is no abdominal tenderness. There is no guarding.   Musculoskeletal:         General: Normal range of motion.      Cervical back: Normal range of motion and neck supple.      Right lower leg: Edema present.      Left lower leg: Edema present.   Skin:     General: Skin is warm and dry.   Neurological:      General: No focal deficit present.      Sensory: No sensory deficit.      Comments: Facial droop   Psychiatric:       Comments: Unable to assess       Significant Labs: All pertinent labs within the past 24 hours have been reviewed.    Significant Imaging: I have reviewed all pertinent imaging results/findings within the past 24 hours.

## 2022-11-11 NOTE — ASSESSMENT & PLAN NOTE
Patient with Paroxysmal (<7 days) atrial fibrillation which is controlled currently with Amiodarone. Patient is currently in sinus rhythm.[id number] Score: 2.  Anticoagulation indicated. Anticoagulation done with lovenox.

## 2022-11-11 NOTE — ASSESSMENT & PLAN NOTE
- suspect due to post-viral PNA with staph  - COVID and flu negative on admission; repeat with RSV  - on BSABx for now given acute decompensation  - empiric tamiflu  - staph bacteremia

## 2022-11-11 NOTE — CONSULTS
Nephrology Consult  H&P      Consult Requested By: Jermain Jackson, *  Reason for Consult: NADIYA    SUBJECTIVE:     History of Present Illness:  Patient is a 67 y.o. male presents with SOB + CP on 22.  Admitted for pneumonia, COPD exceverbation--> sepsis, intubation, pressors  NOw with oliguria and NADIYA, hyponatremia, acidosis      Review of Systems   Unable to perform ROS: Critical illness   Past Medical History:   Diagnosis Date    Anxiety 2016    Bell's palsy     Benign essential HTN 2018    Lacunar infarction     Remote finding seen on cranial imaging during acute workup for Bell's palsy/Ivania White    Pneumonia     age 5    Psoriasis      Past Surgical History:   Procedure Laterality Date    BACK SURGERY N/A     bulging L5    TONSILLECTOMY       Family History   Problem Relation Age of Onset    Lung disease Mother     Glaucoma Mother     Cataracts Mother     Bladder Cancer Father     Alzheimer's disease Father     Glaucoma Father     Prostate cancer Father     Rheum arthritis Sister     Lupus Sister     Coronary artery disease Maternal Grandmother         nonpremature    Colon cancer Neg Hx      Social History     Tobacco Use    Smoking status: Former     Packs/day: 1.00     Years: 45.00     Pack years: 45.00     Types: Cigarettes     Quit date: 2015     Years since quittin.8   Substance Use Topics    Alcohol use: Yes     Alcohol/week: 0.0 standard drinks     Comment: rare    Drug use: No     Review of patient's allergies indicates:   Allergen Reactions    Bee sting [allergen ext-venom-honey bee] Anaphylaxis and Swelling    Venom-yellow jacket Swelling      OBJECTIVE:     Vital Signs (Most Recent)  Vitals:    22 1345 22 1400 22 1415 22 1430   BP: (!) 104/58 (!) 110/56 106/62    BP Location: Right arm      Patient Position: Lying      Pulse: (!) 125 (!) 124 (!) 121 (!) 123   Resp: (!) 22 20 (!) 42 (!) 30   Temp:       TempSrc:       SpO2: (!) 92% (!) 92%  (!) 92% (!) 93%   Weight:       Height:         Date 11/11/22 0700 - 11/12/22 0659   Shift 8989-8656 7744-4174 7333-2809 24 Hour Total   INTAKE   I.V.(mL/kg) 200.2(2.1)   200.2(2.1)   Shift Total(mL/kg) 200.2(2.1)   200.2(2.1)   OUTPUT   Urine(mL/kg/hr) 110(0.1)   110   Shift Total(mL/kg) 110(1.2)   110(1.2)   Weight (kg) 94.5 94.5 94.5 94.5     Medications:   albuterol-ipratropium  3 mL Nebulization Q6H WAKE    aspirin  81 mg Oral Daily    atorvastatin  40 mg Oral QHS    ceFAZolin (ANCEF) IVPB  2 g Intravenous Q12H    enoxaparin  30 mg Subcutaneous Daily    insulin aspart U-100  10 Units Subcutaneous QID    insulin detemir U-100  30 Units Subcutaneous BID    methylPREDNISolone sodium succinate injection  60 mg Intravenous Q8H    metoprolol tartrate  25 mg Oral BID    mupirocin   Nasal BID    oseltamivir  30 mg Per OG tube Daily    pantoprazole  40 mg Intravenous Daily    polyethylene glycol  17 g Per NG tube Daily    senna-docusate 8.6-50 mg  1 tablet Oral BID    sodium chloride 0.9%  500 mL Intravenous Once     Physical Exam  Constitutional:       General: He is not in acute distress.     Appearance: He is not diaphoretic.      Interventions: He is intubated.   HENT:      Head: Normocephalic and atraumatic.   Eyes:      General: No scleral icterus.  Neck:      Vascular: No JVD.   Cardiovascular:      Rate and Rhythm: Regular rhythm. Tachycardia present.      Heart sounds: No murmur heard.    No friction rub.   Pulmonary:      Effort: Pulmonary effort is normal. No respiratory distress. He is intubated.      Breath sounds: Normal breath sounds. No wheezing or rales.   Abdominal:      General: Bowel sounds are normal. There is no distension.      Palpations: Abdomen is soft.      Tenderness: There is no abdominal tenderness.   Musculoskeletal:         General: Swelling (2+ dependant) present.      Cervical back: Neck supple.   Skin:     General: Skin is warm and dry.      Findings: No erythema or rash.    Neurological:      Mental Status: He is alert.      Comments: Sedated RASS 0   Psychiatric:         Mood and Affect: Affect normal.     Diagnostic Results:  X-Ray: Reviewed  US: Reviewed  Echo: Reviewed  ASSESSMENT/PLAN:   ARF, baseline Cr ~ 1.1, presumably ATN from septic shock  Off pressors today  Hypoxic resp failure --> PEEP 7, FIo2 50%    Oliguric  Acidosis  Hyponatremia      Trial of Lasix  Discussed with son possibility of RRT, but not needed yet    Thank you for allowing me to participate in care of your patient  With any question please call   Vilma Dobbs    [facility]  LACI Walters MD, FACP,   GERSON Arvizu MD,   MD CARMELA Randall MD E. V. Harmon, NP  [address]  [place], [address] [phone]  After hours answering service: [phone]

## 2022-11-11 NOTE — ASSESSMENT & PLAN NOTE
Contributing Nutrition Diagnosis  Inadequate energy intake    Related to (etiology):   intubation    Signs and Symptoms (as evidenced by):   NPO    Interventions:  Collaboration with other providers    Nutrition Diagnosis Status:   Continues

## 2022-11-11 NOTE — PLAN OF CARE
The pt remains intubates and sedated in the ICU. Discharge dispo TBD.        11/11/22 1527   Discharge Reassessment   Assessment Type Discharge Planning Reassessment   Did the patient's condition or plan change since previous assessment? No   Discharge Plan discussed with: Adult children;Spouse/sig other   Name(s) and Number(s) Jose Luis Rendon Jr.(son)[phone]/ Lacy(x-wife)[phone]   Discharge Plan A Long-term acute care facility (LTAC)   Discharge Plan B Rehab   DME Needed Upon Discharge  other (see comments)  (TBD)   Discharge Barriers Identified None   Why the patient remains in the hospital Requires continued medical care   Post-Acute Status   Discharge Delays None known at this time

## 2022-11-11 NOTE — HPI
11/11/2022: We are consulted for bacteremia, Staph aureus.  He initially presented with shortness of breath and chest pain with DKA, newly diagnosed diabetes on 11/7/2022.  He has a history of hypertension.  He has had negative stress tests in the past.  He complained of chest pain and shortness of breath.  His chest pain was considered as musculoskeletal, reproduced by bending or lifting objects.  He was afebrile on arrival to the ED.    He has subsequently been intubated for hypercapnic respiratory failure.  His x-ray did show interstitial markings increased.  Blood cultures have grown staph aureus.  At time of intubation he was mildly hypotensive.  He was on vasopressors for a brief period of time.  He has developed acute renal failure and has been seen by Nephrology.  He is oliguric.  He developed AFib and is now in sinus rhythm with IV amiodarone.    Andres completed this morning confirms aortic valve vegetation.  There is no aortic regurgitation his ejection fraction and wall motion are normal.  No intracardiac thrombi seen.  Cardiac management discussed with the patient's son today.  He states that the patient has been exposed to rats at his residence.  There is no history of IV drug abuse.

## 2022-11-11 NOTE — NURSING
Dr. Price at the bedside during titration of sedation for a bedside TTE, and levophed restarted to tolerate sedation for procedure

## 2022-11-11 NOTE — BRIEF OP NOTE
EF Normal  GAGAN clear  AV with mobile Vegetation/mass, No Aortic regurgitation. Tri leaflet valve without any obvious anatomic abnormalities.

## 2022-11-11 NOTE — ASSESSMENT & PLAN NOTE
- likely due to post-viral PNA with staph bacteremia and endocarditis  - likely distributive component due to sedation  - wean off levophed  - continue IV abx  - repeat BCx

## 2022-11-11 NOTE — SUBJECTIVE & OBJECTIVE
Past Medical History:   Diagnosis Date    Anxiety 2016    Bell's palsy     Benign essential HTN 2018    Lacunar infarction     Remote finding seen on cranial imaging during acute workup for Bell's palsy/Ivania White    Pneumonia     age 5    Psoriasis        Past Surgical History:   Procedure Laterality Date    BACK SURGERY N/A     bulging L5    TONSILLECTOMY         Review of patient's allergies indicates:   Allergen Reactions    Bee sting [allergen ext-venom-honey bee] Anaphylaxis and Swelling    Venom-yellow jacket Swelling       No current facility-administered medications on file prior to encounter.     Current Outpatient Medications on File Prior to Encounter   Medication Sig    amLODIPine (NORVASC) 5 MG tablet Take 1 tablet (5 mg total) by mouth once daily.    aspirin (ECOTRIN) 81 MG EC tablet Take 81 mg by mouth.    atorvastatin (LIPITOR) 40 MG tablet TAKE 1 TABLET(40 MG) BY MOUTH EVERY DAY (Patient taking differently: Take 40 mg by mouth once daily.)    EScitalopram oxalate (LEXAPRO) 10 MG tablet TAKE 1 TABLET(10 MG) BY MOUTH EVERY DAY (Patient taking differently: Take 10 mg by mouth once daily.)     Family History       Problem Relation (Age of Onset)    Alzheimer's disease Father    Bladder Cancer Father    Cataracts Mother    Coronary artery disease Maternal Grandmother    Glaucoma Mother, Father    Lung disease Mother    Lupus Sister    Prostate cancer Father    Rheum arthritis Sister          Tobacco Use    Smoking status: Former     Packs/day: 1.00     Years: 45.00     Pack years: 45.00     Types: Cigarettes     Quit date: 2015     Years since quittin.8    Smokeless tobacco: Not on file   Substance and Sexual Activity    Alcohol use: Yes     Alcohol/week: 0.0 standard drinks     Comment: rare    Drug use: No    Sexual activity: Yes     Partners: Female     Review of Systems   Reason unable to perform ROS: Intubated sedated.   Objective:     Vital Signs (Most Recent):  Temp: 98.2  °F (36.8 °C) (11/11/22 1115)  Pulse: (!) 116 (11/11/22 1230)  Resp: (!) 22 (11/11/22 1230)  BP: 108/64 (11/11/22 1230)  SpO2: (!) 93 % (11/11/22 1230)   Vital Signs (24h Range):  Temp:  [97.3 °F (36.3 °C)-98.2 °F (36.8 °C)] 98.2 °F (36.8 °C)  Pulse:  [] 116  Resp:  [18-35] 22  SpO2:  [91 %-97 %] 93 %  BP: ()/(50-75) 108/64     Weight: 94.5 kg (208 lb 5.4 oz)  Body mass index is 29.89 kg/m².    SpO2: (!) 93 %  O2 Device (Oxygen Therapy): ventilator      Intake/Output Summary (Last 24 hours) at 11/11/2022 1241  Last data filed at 11/11/2022 1230  Gross per 24 hour   Intake 1236.16 ml   Output 265 ml   Net 971.16 ml       Lines/Drains/Airways       Drain  Duration                  NG/OG Tube 11/09/22 0805 orogastric 16 Fr. Right mouth 2 days         Urethral Catheter 11/09/22 0815 Non-latex 14 Fr. 2 days              Airway  Duration                  Airway - Non-Surgical 11/09/22 0804 Endotracheal Tube 2 days              Peripheral Intravenous Line  Duration                  Peripheral IV - Single Lumen 11/08/22 2050 20 G Distal;Left;Posterior Forearm 2 days         Peripheral IV - Single Lumen 11/09/22 0500 20 G Posterior;Right Forearm 2 days         Peripheral IV - Single Lumen 11/09/22 0900 18 G Anterior;Distal;Left Upper Arm 2 days         Peripheral IV - Single Lumen 11/10/22 0044 18 G Right Forearm 1 day                    Physical Exam  Constitutional:       Appearance: Normal appearance.   HENT:      Head: Normocephalic and atraumatic.      Nose: Nose normal.      Mouth/Throat:      Mouth: Mucous membranes are moist.      Pharynx: Oropharynx is clear.   Eyes:      Pupils: Pupils are equal, round, and reactive to light.   Cardiovascular:      Rate and Rhythm: Normal rate and regular rhythm.      Pulses:           Carotid pulses are 2+ on the right side and 2+ on the left side.       Radial pulses are 2+ on the right side and 2+ on the left side.   Pulmonary:      Effort: Pulmonary effort is normal.       Breath sounds: Normal breath sounds.   Abdominal:      General: Abdomen is flat. Bowel sounds are normal.      Palpations: Abdomen is soft.   Musculoskeletal:      Cervical back: Normal range of motion and neck supple.   Skin:     General: Skin is warm and dry.      Comments: Small left arm pustule noted       Significant Labs: ABG:   Recent Labs   Lab 11/09/22 2002 11/09/22  2207 11/10/22  0543   PH 7.248* 7.262* 7.326*   PCO2 56.0* 50.6* 43.9   HCO3 24.5 22.8* 23.0*   POCSATURATED 86* 90* 92*   BE -3 -4 -3   , Blood Culture:   Recent Labs   Lab 11/09/22  1719 11/10/22  1627   LABBLOO Gram stain peds bottle: Gram positive cocci in clusters resembling Staph  Positive results previously called 11/10/2022  STAPHYLOCOCCUS AUREUS  ID consult required at [facility] and [facility] locations.  For susceptibility see order # [id number]  *  Gram stain aer bottle: Gram positive cocci in clusters resembling Staph  Positive results previously called 11/10/2022  14:39  STAPHYLOCOCCUS AUREUS  Susceptibility pending  ID consult required at [facility] and [facility] locations.  * No Growth to date  No Growth to date   , BMP:   Recent Labs   Lab 11/09/22 2007 11/09/22  2352 11/10/22  0426 11/10/22  0758 11/10/22  1822 11/11/22  0029 11/11/22  0531   *  545*   < > 264*   < > 298* 247* 290*   *   < > 134*   < > 130* 133* 130*   K 4.0   < > 3.6   < > 3.3* 3.8 4.5   CL 96   < > 100   < > 89* 96 95   CO2 18*   < > 17*   < > 17* 18* 19*   BUN 62*   < > 66*   < > 74* 83* 87*   CREATININE 3.0*   < > 3.2*   < > 5.5* 4.5* 4.9*   CALCIUM 8.6*   < > 8.1*   < > 7.5* 8.0* 8.3*   MG 3.0*  --  2.9*  --   --   --  3.0*    < > = values in this interval not displayed.   , CBC   Recent Labs   Lab 11/10/22  0426 11/11/22  0531   WBC 10.38 14.43*   HGB 14.1 14.3   HCT 42.7 41.0   PLT 58* 90*   , INR No results for input(s): INR, PROTIME in the last 48 hours., Troponin No results for input(s): TROPONINI in the last  48 hours., and All pertinent lab results from the last 24 hours have been reviewed.    Significant Imaging:

## 2022-11-11 NOTE — PLAN OF CARE
Breathing trial competed today, patient's HR converted to A-fib RVR, re sedated patient very restless and elevated HR and decrease in O2 sats noted, continues with Fio2 50% and PEEP 7, restraints in place, TTE completed and vegetation mass noted in the aorta, lasix challenge 120 mg given, UO did not respond, notified MD, will continue to monitor labs and UO and reassess in the morning. Tube feedings started @ 10 mls/hr Glucerna 1.5, Amiodarone gtt continued, patient did have a large loose brown BM, 's, Insulin adjusted. Patient is comfortable.

## 2022-11-11 NOTE — NURSING
Tube feeds started @ 10 ml/hr, Glucerna 1.5, currently unable to start free water flushes due to insufficient UO. Will continue to monitor and tx per MD orders and hospital protocol. Bed is in the lowest position, wheels are locked.

## 2022-11-11 NOTE — ASSESSMENT & PLAN NOTE
Patient with acute kidney injury likely due to acute tubular necrosis NADIYA is currently worsening. Labs reviewed- Renal function/electrolytes with Estimated Creatinine Clearance: 14.9 mL/min (A) (based on SCr of 5.4 mg/dL (H)). according to latest data. Monitor urine output and serial BMP and adjust therapy as needed. Avoid nephrotoxins and renally dose meds for GFR listed above.   - likely due to septic shock/DKA  - BP support with pressors as needed  - worsening failure today  - IV lasix trial  - Nephrology consultation

## 2022-11-11 NOTE — ASSESSMENT & PLAN NOTE
Workup in ED remarkable for hyperglycemia, HAGMA, elevated beta hydroxy.   New diagnosis of DM  DKA protocol  -A1c greater than 14; will need insulin at time of discharge  -reopened gap; transitioned back to insulin gtt  - continue detemir 30u bid

## 2022-11-11 NOTE — PROGRESS NOTES
[facility] Medicine  Progress Note    Patient Name: Jose Luis Rendon  MRN: [id number]  Patient Class: IP- Inpatient   Admission Date: 11/7/2022  Length of Stay: 4 days  Attending Physician: Jermain Jackson, *  Primary Care Provider: Deric Armstrong MD        Subjective:     Principal Problem:DKA (diabetic ketoacidosis)        HPI:  67M with PMH of COPD, HTN, CVA, Bell's Palsy presents with c/o sob, chest pain, nausea, vomiting x 4 days. Pt first noticed chest pain after picking up a family member off the floor. He also has had multiple episodes of N/V with retching which has exacerbated symptoms. The pain radiates around right side to his back. Also has SOB which pt states is chronic but has progressively worsened recently. Denies fever, chills, palpitations, diaphoresis, abd pain, diarrhea, dysuria. No recent fall/trauma. Workup in ED remarkable for hyperglycemia, HAGMA, elevated beta hydroxy. CXR showing Subtle bibasilar interstitial opacities, no focal consolidation. No known history of CHF or DM. Patient will be admitted to hospital medicine service for further management.      Overview/Hospital Course:  No notes on file    Interval History:  uneventful night. Remains intubated and sedated. SANTA today with vegetations. Had BM this afternoon.    Review of Systems   Unable to perform ROS: Intubated   Objective:     Vital Signs (Most Recent):  Temp: 98.2 °F (36.8 °C) (11/11/22 1115)  Pulse: (!) 123 (11/11/22 1430)  Resp: (!) 30 (11/11/22 1430)  BP: 106/62 (11/11/22 1415)  SpO2: (!) 93 % (11/11/22 1430)   Vital Signs (24h Range):  Temp:  [97.3 °F (36.3 °C)-98.2 °F (36.8 °C)] 98.2 °F (36.8 °C)  Pulse:  [] 123  Resp:  [18-42] 30  SpO2:  [91 %-97 %] 93 %  BP: ()/(50-75) 106/62     Weight: 94.5 kg (208 lb 5.4 oz)  Body mass index is 29.89 kg/m².    Intake/Output Summary (Last 24 hours) at 11/11/2022 1437  Last data filed at 11/11/2022 1400  Gross per 24 hour   Intake 1248.35 ml    Output 305 ml   Net 943.35 ml        Physical Exam  Constitutional:       Appearance: He is ill-appearing. He is not toxic-appearing.      Interventions: He is sedated and intubated.   HENT:      Head: Normocephalic and atraumatic.      Mouth/Throat:      Mouth: Mucous membranes are moist.   Eyes:      Conjunctiva/sclera: Conjunctivae normal.      Pupils: Pupils are equal, round, and reactive to light.   Cardiovascular:      Rate and Rhythm: Normal rate and regular rhythm.      Heart sounds: Normal heart sounds.   Pulmonary:      Effort: Pulmonary effort is normal. He is intubated.      Breath sounds: Rales (bibasilar) present.   Abdominal:      General: There is no distension.      Palpations: Abdomen is soft.      Tenderness: There is no abdominal tenderness. There is no guarding.   Musculoskeletal:         General: Normal range of motion.      Cervical back: Normal range of motion and neck supple.      Right lower leg: Edema present.      Left lower leg: Edema present.   Skin:     General: Skin is warm and dry.   Neurological:      General: No focal deficit present.      Sensory: No sensory deficit.      Comments: Facial droop   Psychiatric:      Comments: Unable to assess       Significant Labs: All pertinent labs within the past 24 hours have been reviewed.    Significant Imaging: I have reviewed all pertinent imaging results/findings within the past 24 hours.      Assessment/Plan:      * DKA (diabetic ketoacidosis)  Workup in ED remarkable for hyperglycemia, HAGMA, elevated beta hydroxy.   New diagnosis of DM  DKA protocol  -A1c greater than 14; will need insulin at time of discharge  -reopened gap; transitioned back to insulin gtt  - continue detemir 30u bid    Acute bacterial endocarditis  - SANTA with significant vegetations  - continue IV antibiotics  - serial cultures for clearance  - ID following      Acute renal failure with acute tubular necrosis superimposed on stage 3a chronic kidney disease  Patient  with acute kidney injury likely due to acute tubular necrosis NADIYA is currently worsening. Labs reviewed- Renal function/electrolytes with Estimated Creatinine Clearance: 14.9 mL/min (A) (based on SCr of 5.4 mg/dL (H)). according to latest data. Monitor urine output and serial BMP and adjust therapy as needed. Avoid nephrotoxins and renally dose meds for GFR listed above.   - likely due to septic shock/DKA  - BP support with pressors as needed  - worsening failure today  - IV lasix trial  - Nephrology consultation    Bacteremia due to group B Streptococcus  - blood cultures growing staph (appears to be MSSA based off PCR) and strep  - repeat blood cultures for clearance  - admission TTE without vegetation; SANTA with vegetation  - on BSABx; de-escalated to cefazolin  - ID consult      Atrial fibrillation with RVR  Patient with Paroxysmal (<7 days) atrial fibrillation which is controlled currently with Amiodarone. Patient is currently in sinus rhythm.[id number] Score: 2.  Anticoagulation indicated. Anticoagulation done with lovenox.        Septic shock  - likely due to post-viral PNA with staph bacteremia and endocarditis  - likely distributive component due to sedation  - wean off levophed  - continue IV abx  - repeat BCx    Endotracheally intubated  - worsening respiratory failure  - intubated 11/9  - Pulm following      Bacterial pneumonia  - suspect due to post-viral PNA with staph  - COVID and flu negative on admission; repeat with RSV  - on BSABx for now given acute decompensation  - empiric tamiflu  - staph bacteremia    Centrilobular emphysema  -noted on imaging  -needs PFTs as outpatient and pulmonology follow-up      COPD with acute exacerbation  As above-nightly BiPAP for in addition to prednisone, azithromycin  -decompensation this morning prompting intubation  -continue steroids; on broad-spectrum antibiotics  -additional viral testing    Acute hypoxemic respiratory failure  Likely 2/2 COPD  CXR showing  subtle bibasilar interstitial opacities.  No focal consolidation  CT chest 10/2021: Centrilobular emphysema with an upper lobe predominance.  Bibasilar crackles on exam  Standing duonebs  Started on azithromycin and prednisone; fevers overnight, broadened antibiotics and repeating flu/RSV/COVID  Supp O2 PRN to keep O2 sat 88-92%  TTE  Strict I&O  Hold IV fluids    Benign essential HTN  - hold amlodipine      VTE Risk Mitigation (From admission, onward)         Ordered     enoxaparin injection 30 mg  Daily         11/10/22 1155     IP VTE LOW RISK PATIENT  Once         11/07/22 1231     Place sequential compression device  Until discontinued         11/07/22 1231                Discharge Planning   BELINDA:      Code Status: Full Code   Is the patient medically ready for discharge?:     Reason for patient still in hospital (select all that apply): Patient unstable  Discharge Plan A: Home            Critical care time spent on the evaluation and treatment of severe organ dysfunction, review of pertinent labs and imaging studies, discussions with consulting providers and discussions with patient/family: 45 minutes.      Jermain aJckson MD  Department of Hospital Medicine   [place] - Intensive Care

## 2022-11-11 NOTE — ASSESSMENT & PLAN NOTE
He remains intubated at this time.  Sedation was lightened up today and he seems to be with appropriate behavior.

## 2022-11-11 NOTE — PLAN OF CARE
Recommendation:  1. When medically acceptable, initiate TF of Glucerna 1.5 at 10ml/hr. Advance as tolerated to goal rate of 40ml/hr (while on propofol) which would provide 1440 kcal, 79g protein, & 728ml free water.     2. Monitor glucose and renal labs.     3. RD to continue to follow to monitor nutrition status    Goals:  TF will be started by RD follow up  Nutrition Goal Status: new

## 2022-11-11 NOTE — PLAN OF CARE
Problem: Adult Inpatient Plan of Care  Goal: Plan of Care Review  Outcome: Ongoing, Progressing     No acute changes this shift. Pt remains intubated/sedated on propofol gtt. Pt calm; responds to pain. NSR on tele; Amio gtt remains infusing. SpO2 95% on 55% FiO2. BP stable without the need for pressors. No bowel movement overnight. Very little UOP for shift with kidney function worsening on labs; plan to initiate dialysis today. Monitoring glucose q4hrs; coverage with SSI. Labs monitored q6hrs; K+ replacement given x2. Safety maintained; restraints in place. Will continue with plan of care.

## 2022-11-11 NOTE — PROGRESS NOTES
[facility] Infectious Diseases Progress Note    Assessment/Recommendations:     Jose Luis Rendon is a 67 y.o. male with:     Sepsis with bacteremia of indeterminate source  -Continue IV cefazolin; adjust renally as needed  -Continue to follow cultures and speciation. Repeat bloox cx pending to assess for clearance  -Recommend SANTA to further evaluate heart function and rule out vegetation in addition to TTE already performed; in process  -Will likely require 4 weeks of antibiotic therapy.  -Acute hepatitis panel pending    Toby Torres MD  [facility]  ID service    Subjective:      No acute events overnight. Interval improvement in vital signs. Continues to be sedated on ventilation. Minimal urine output and anticipating nephrology eval/possible dialysis today.    Subjectively, family members are in the room. Updated on plan of care from ID perspective     Objective:   Last 24 Hour Vital Signs:  BP  Min: 92/50  Max: 149/79  Temp  Av.7 °F (36.5 °C)  Min: 97.3 °F (36.3 °C)  Max: 98.2 °F (36.8 °C)  Pulse  Av.1  Min: 80  Max: 139  Resp  Av.1  Min: 20  Max: 35  SpO2  Av.9 %  Min: 92 %  Max: 97 %  Weight  Av.5 kg (208 lb 5.4 oz)  Min: 94.5 kg (208 lb 5.4 oz)  Max: 94.5 kg (208 lb 5.4 oz)  I/O last 3 completed shifts:  In: 3581.2 [I.V.:1317.8; NG/GT:320; IV Piggyback:1943.5]  Out: 512 [Urine:512]    Physical Exam  Constitutional:       Appearance: He is ill-appearing.      Comments: Sedated on ventilator   HENT:      Head: Atraumatic.      Right Ear: External ear normal.      Left Ear: External ear normal.      Mouth/Throat:      Comments: ET tube in place  Cardiovascular:      Rate and Rhythm: Normal rate and regular rhythm.      Heart sounds: Normal heart sounds.   Pulmonary:      Comments: Air movement bilateral lungs on auscultation  Abdominal:      General: There is no distension.      Palpations: Abdomen is soft.   Musculoskeletal:         General: No deformity.   Skin:     General: Skin is  warm.   Neurological:      Comments: Medically sedated. Unable to perform full neurological assessment      Vent Mode: A/CMV-VC  Oxygen Concentration (%):  [40-55] 50  Resp Rate Total:  [20 br/min-28 br/min] 22 br/min  Vt Set:  [500 mL] 500 mL  PEEP/CPAP:  [6.8 cmH20-8.5 cmH20] 6.8 cmH20  Mean Airway Pressure:  [11.7 szF91-77 cmH20] 13 cmH20      Laboratory:  Laboratory Data Reviewed: yes  Pertinent Findings:  Recent Labs   Lab 11/07/22  1118 11/07/22  1232 11/09/22  0601 11/09/22  1645 11/09/22 2002 11/09/22 2007 11/09/22  2352 11/10/22  0426 11/10/22  0758 11/10/22  1822 11/11/22  0029 11/11/22  0531   WBC  --    < > 12.44  --   --   --   --  10.38  --   --   --  14.43*   HGB  --    < > 15.5  --   --   --   --  14.1  --   --   --  14.3   HCT  --    < > 47.7  --  45  --   --  42.7  --   --   --  41.0   PLT  --    < > 86*  --   --   --   --  58*  --   --   --  90*   MCV  --    < > 94  --   --   --   --  92  --   --   --  89   RDW  --    < > 12.9  --   --   --   --  12.7  --   --   --  13.3   *   < > 127*   < >  --  131*   < > 134*   < > 130* 133* 130*   K 4.3   < > 3.8   < >  --  4.0   < > 3.6   < > 3.3* 3.8 4.5   CL 97   < > 95   < >  --  96   < > 100   < > 89* 96 95   CO2 12*   < > 18*   < >  --  18*   < > 17*   < > 17* 18* 19*   BUN 28*   < > 50*   < >  --  62*   < > 66*   < > 74* 83* 87*   CREATININE 1.4   < > 2.3*   < >  --  3.0*   < > 3.2*   < > 5.5* 4.5* 4.9*   *   < > 454*   < >  --  545*  545*   < > 264*   < > 298* 247* 290*   PROT 7.4  --   --   --   --  5.9*  --   --   --   --   --   --    ALBUMIN 3.2*  --   --   --   --  2.0*  --   --   --   --   --   --    BILITOT 1.6*  --   --   --   --  1.2*  --   --   --   --   --   --    AST 27  --   --   --   --  59*  --   --   --   --   --   --    ALKPHOS 97  --   --   --   --  131  --   --   --   --   --   --    ALT 47*  --   --   --   --  58*  --   --   --   --   --   --     < > = values in this interval not displayed.         Microbiology  Data:  Microbiology Results (last 7 days)       Procedure Component Value Units Date/Time    Blood culture [[id number]]  (Abnormal) Collected: 11/09/22 0337    Order Status: Completed Specimen: Blood from Antecubital, Left Updated: 11/11/22 1135     Blood Culture, Routine Gram stain peds bottle: Gram positive cocci in clusters resembling Staph      Gram positive cocci in chains resembling Strep      Results called to and read back by:Lila Panda RN 11/09/2022  20:00      STAPHYLOCOCCUS AUREUS  ID consult required at [facility].  For susceptibility see order # [id number]      Narrative:      Please draw 10 minutes apart, from 2 separate venous sites.    Blood culture [[id number]]  (Abnormal)  (Susceptibility) Collected: 11/09/22 0337    Order Status: Completed Specimen: Blood Updated: 11/11/22 1133     Blood Culture, Routine Gram stain nohemi bottle: Gram positive cocci in chains resembling Strep      Results called to and read back by:Alexus Kang RN 11/09/2022  17:02      Gram stain aer bottle: Gram positive cocci in clusters resembling Staph      Results called to and read back by:Alexus Kang RN 11/09/2022  18:41      Gram stain aer bottle: Gram positive cocci in chains resembling Strep      STREPTOCOCCUS AGALACTIAE (GROUP B)  Beta-hemolytic streptococci are routinely susceptible to   penicillins,cephalosporins and carbapenems.        STAPHYLOCOCCUS AUREUS  ID consult required at [facility] and [facility].      Blood culture [[id number]] Collected: 11/10/22 1627    Order Status: Completed Specimen: Blood Updated: 11/11/22 1115     Blood Culture, Routine No Growth to date    Blood culture [[id number]] Collected: 11/10/22 1627    Order Status: Completed Specimen: Blood Updated: 11/11/22 1115     Blood Culture, Routine No Growth to date    Blood culture [[id number]]  (Abnormal) Collected: 11/09/22 1719    Order Status: Completed Specimen: Blood Updated: 11/11/22 0922      Blood Culture, Routine Gram stain peds bottle: Gram positive cocci in clusters resembling Staph      Positive results previously called 11/10/2022      STAPHYLOCOCCUS AUREUS  [facility].  For susceptibility see order # [id number]      Blood culture [[id number]]  (Abnormal) Collected: 11/09/22 1719    Order Status: Completed Specimen: Blood Updated: 11/11/22 0920     Blood Culture, Routine Gram stain aer bottle: Gram positive cocci in clusters resembling Staph      Positive results previously called 11/10/2022  14:39      STAPHYLOCOCCUS AUREUS  Susceptibility pending  [facility] consult required [facility] locations.      Urine Culture High Risk [[id number]] Collected: 11/09/22 1817    Order Status: Completed Specimen: Urine, Catheterized Updated: 11/11/22 0903    Narrative:      Indicated criteria for high risk culture:->Other  Other (specify):->BPH, prior UTI, sepsis    Culture, Respiratory with Gram Stain [[id number]]  (Abnormal) Collected: 11/09/22 1346    Order Status: Completed Specimen: Respiratory from Tracheal Aspirate Updated: 11/11/22 0842     Respiratory Culture No Pseudomonas isolated.      STAPHYLOCOCCUS AUREUS  Moderate  Susceptibility pending       Gram Stain (Respiratory) <10 epithelial cells per low power field.     Gram Stain (Respiratory) Rare WBC's     Gram Stain (Respiratory) No organisms seen    Culture, Respiratory with Gram Stain [[id number]]  (Abnormal) Collected: 11/09/22 1346    Order Status: Completed Specimen: Respiratory from Tracheal Aspirate Updated: 11/11/22 0840     Respiratory Culture No Pseudomonas isolated.      STAPHYLOCOCCUS AUREUS  Moderate  Susceptibility pending       Gram Stain (Respiratory) <10 epithelial cells per low power field.     Gram Stain (Respiratory) Rare WBC's     Gram Stain (Respiratory) No organisms seen    MRSA/SA Rapid ID by PCR from Blood culture [[id number]]  (Abnormal) Collected: 11/09/22  2001    Order Status: Completed Updated: 11/09/22 2110     Staph aureus ID by PCR Positive     MRSA ID by PCR Negative    Narrative:      Please draw 10 minutes apart, from 2 separate venous sites.    MRSA/SA Rapid ID by PCR from Blood culture [[id number]]  (Abnormal) Collected: 11/09/22 0337    Order Status: Completed Updated: 11/09/22 2000     Staph aureus ID by PCR Positive     MRSA ID by PCR Negative              Antimicrobials:  Cefazolin    Other Results:  Radiology Results:  X-Ray Chest 1 View    Result Date: 11/9/2022  EXAMINATION: XR CHEST 1 VIEW CLINICAL HISTORY: ETT placement; TECHNIQUE: Single frontal view of the chest was performed. COMPARISON: 11/08/2022 FINDINGS: Endotracheal tube tip projects approximately 5 cm above the mamie.  Enteric tube courses through the left upper quadrant however extends past field of view. Mediastinal structures are midline.  Stable cardiomediastinal silhouette.  Aortic calcification. Stable diffuse bilateral interstitial attenuation which could represent pulmonary edema or infection.  No new focal airspace opacity.  Slightly increased obscuration of the left hemidiaphragm and costophrenic angle suggestive for slightly increased volume left pleural effusion and atelectasis.  No pneumothorax.     As above. Electronically signed by: Sathish Candelaria Date:    11/09/2022 Time:    08:55    X-Ray Chest 1 View    Result Date: 11/7/2022  EXAMINATION: XR CHEST 1 VIEW CLINICAL HISTORY: Shortness of breath TECHNIQUE: Single frontal view of the chest was performed. COMPARISON: 09/18/2017. FINDINGS: Monitoring EKG leads are present.  The trachea is unremarkable.  There are calcifications of the aortic knob.  The cardiomediastinal silhouette is within normal limits.  There is no evidence of free air beneath the hemidiaphragms.  There are no pleural effusions.  There is no evidence of a pneumothorax.  There is no evidence of pneumomediastinum.  There are subtle interstitial opacities with  basilar predominance.  There are degenerative changes in the osseous structures.     Subtle bibasilar interstitial opacities.  No focal consolidation. Electronically signed by: Wade Heredia MD Date:    11/07/2022 Time:    11:09    X-Ray Abdomen AP 1 View    Result Date: 11/9/2022  EXAMINATION: XR ABDOMEN AP 1 VIEW CLINICAL HISTORY: abdominal distension; TECHNIQUE: AP View(s) of the abdomen was performed. COMPARISON: None FINDINGS: Single-view abdomen supine. Air and stool is seen within the large bowel and projected over the rectum noting moderate stool throughout the colon.  No focally dilated small bowel loops.  There are degenerative changes of the osseous structures.  No findings to suggest pneumatosis.  There are no calcifications to convincingly suggest nephrolithiasis or cholelithiasis.     1. Moderate stool in the colon, possibly reflecting constipation. Electronically signed by: Daniel Montgomery MD Date:    11/09/2022 Time:    06:02    X-Ray Chest AP Portable    Result Date: 11/11/2022  EXAMINATION: XR CHEST AP PORTABLE CLINICAL HISTORY: intubated, pneumonia; TECHNIQUE: Single frontal view of the chest was performed. COMPARISON: 11/09/2022 FINDINGS: Support devices: Esophagogastric tube terminates above the mamie at the medial clavicular head level.  Esophagogastric tube courses below the diaphragm.  Overlying monitoring leads. Allowing for slight differences in position and technique, heart and lungs appear similar without interval detrimental change.  Small pleural effusions.  No gross pneumothorax.     As above. Electronically signed by: Sathish Oliveira Date:    11/11/2022 Time:    08:32    X-Ray Chest AP Portable    Result Date: 11/8/2022  EXAMINATION: XR CHEST AP PORTABLE CLINICAL HISTORY: duoneb; TECHNIQUE: Single frontal view of the chest was performed. COMPARISON: 11/07/2022 FINDINGS: Cardiac monitoring leads overlie the chest.  The cardiomediastinal silhouette is unchanged in size and  configuration.  The lungs are symmetrically expanded with diffuse coarse interstitial attenuation.  There are persistent bibasilar interstitial opacities.  There is increased opacity in the left lower lung and blunting of the left costophrenic angle which could reflect atelectatic/consolidative change and possible pleural fluid.  No new or enlarging pneumothorax.     Please see above. Electronically signed by: Theresa Benson MD Date:    11/08/2022 Time:    22:14    Echo    Result Date: 11/8/2022  · The left ventricle is normal in size with mild concentric hypertrophy and normal systolic function. · The estimated ejection fraction is 55%. · Indeterminate left ventricular diastolic function. · Mild right ventricular enlargement with normal right ventricular systolic function. · Normal central venous pressure (3 mmHg). · The estimated PA systolic pressure is 29 mmHg. · Overall the study quality was technically difficult      X-ray Abdomen for NG Tube Placement (Nursing should notify Radiology after placement)    Result Date: 11/9/2022  EXAMINATION: XR NON-RADIOLOGIST PERFORMED NG/GASTRIC TUBE CHECK CLINICAL HISTORY: OGT placement; TECHNIQUE: AP view of the upper abdomen COMPARISON: 11/09/2022 FINDINGS: Enteric tube tip and side port projects over the stomach.  No evidence of large volume free intraperitoneal air.  Partial visualization of gaseous distension of bowel.  Please see the same-day and similar time stamp chest radiograph report for thoracic assessment.     As above. Electronically signed by: Sathish Candelaria Date:    11/09/2022 Time:    08:57      Current Medications:     Infusions:   amiodarone in dextrose 5% 0.5 mg/min (11/11/22 0856)    NORepinephrine bitartrate-D5W Stopped (11/10/22 0746)    propofoL Stopped (11/11/22 0834)        Scheduled:   albuterol-ipratropium  3 mL Nebulization Q6H WAKE    aspirin  81 mg Oral Daily    atorvastatin  40 mg Oral QHS    ceFAZolin (ANCEF) IVPB  2 g Intravenous Q12H     enoxaparin  30 mg Subcutaneous Daily    insulin detemir U-100  30 Units Subcutaneous BID    methylPREDNISolone sodium succinate injection  60 mg Intravenous Q8H    metoprolol tartrate  25 mg Oral BID    mupirocin   Nasal BID    oseltamivir  30 mg Per OG tube Daily    pantoprazole  40 mg Intravenous Daily    polyethylene glycol  17 g Per NG tube Daily    senna-docusate 8.6-50 mg  1 tablet Oral BID    sodium chloride 0.9%  500 mL Intravenous Once        PRN:  acetaminophen, aluminum-magnesium hydroxide-simethicone, dextrose 10%, dextrose 10%, dextrose 10%, dextrose 10%, dextrose 10%, dextrose 10%, glucagon (human recombinant), glucagon (human recombinant), glucose, glucose, influenza 65up-adj, insulin aspart U-100, magnesium oxide, magnesium oxide, magnesium sulfate IVPB, melatonin, naloxone, ondansetron, oxyCODONE-acetaminophen, potassium bicarbonate, potassium bicarbonate, potassium bicarbonate, potassium chloride, potassium, sodium phosphates, potassium, sodium phosphates, simethicone, sodium chloride 0.9%, sodium chloride 0.9%

## 2022-11-11 NOTE — ASSESSMENT & PLAN NOTE
There were complaints of chest pain on admission.  He had a negative Lexiscan in 2021.  There was normal ejection fraction without infarct pattern or reversible ischemia noted.  His Electrocardiogram showed right bundle branch block with left axis deviation.  During AFib and tachycardia he did not have marked ischemic ST changes.  There is low suspicion for chest pain related to coronary artery disease based on presentation.

## 2022-11-11 NOTE — ASSESSMENT & PLAN NOTE
- SANTA with significant vegetations  - continue IV antibiotics  - serial cultures for clearance  - ID following

## 2022-11-12 NOTE — ASSESSMENT & PLAN NOTE
Patient with Paroxysmal (<7 days) atrial fibrillation which is controlled currently with Amiodarone. Patient is currently in sinus rhythm.[id number] Score: 2.  Anticoagulation indicated. Anticoagulation done with lovenox.  -transitioning amiodarone to p.o.

## 2022-11-12 NOTE — ASSESSMENT & PLAN NOTE
He has a history of hypertension treated with amlodipine as an outpatient  -currently off antihypertensive therapy with intermittent necessity for vasopressor treatment.

## 2022-11-12 NOTE — ASSESSMENT & PLAN NOTE
Patient with acute kidney injury likely due to acute tubular necrosis NADIYA is currently worsening. Labs reviewed- Renal function/electrolytes with Estimated Creatinine Clearance: 12.6 mL/min (A) (based on SCr of 6.4 mg/dL (H)). according to latest data. Monitor urine output and serial BMP and adjust therapy as needed. Avoid nephrotoxins and renally dose meds for GFR listed above.   - likely due to septic shock/DKA  - BP support with pressors as needed  - worsening failure today  - IV lasix trial without improvement  - Nephrology consultation: Plan to initiate dialysis today

## 2022-11-12 NOTE — ASSESSMENT & PLAN NOTE
- SANTA with significant vegetations  - continue IV antibiotics  - serial cultures for clearance  - anticipate 6 weeks of IV antibiotics from date of culture clearance with cefazolin  - ID following

## 2022-11-12 NOTE — ASSESSMENT & PLAN NOTE
Infectious disease is following.  SANTA confirms vegetation on the aortic valve.  -no aortic regurgitation on transesophageal echocardiography.  I did not appreciate other valvular involvement.

## 2022-11-12 NOTE — PROGRESS NOTES
[facility] - Intensive Care  Critical Care Medicine  Progress Note    Patient Name: Jose Luis Rendon  MRN: [id number]  Admission Date: 11/7/2022  Hospital Length of Stay: 4 days  Code Status: Full Code  Attending Provider: Jermain Jackson, *  Primary Care Provider: Deric Armstrong MD   Principal Problem: DKA (diabetic ketoacidosis)    Subjective:     Interval History/Significant Events:   No acute overnight event. Patient is afebrile, off pressor. Patient had episode of atrial fib with RVR and metoprolol was added.   Underwent SANTA today that showed AV with mobile Vegetation/mass.      Review of Systems Unable to obtain  Objective:     Vital Signs (Most Recent):  Temp: 98.5 °F (36.9 °C) (11/11/22 1905)  Pulse: (!) 122 (11/11/22 1950)  Resp: (!) 25 (11/11/22 1950)  BP: 105/60 (11/11/22 1950)  SpO2: (!) 92 % (11/11/22 1950)   Vital Signs (24h Range):  Temp:  [97.3 °F (36.3 °C)-98.5 °F (36.9 °C)] 98.5 °F (36.9 °C)  Pulse:  [] 122  Resp:  [18-42] 25  SpO2:  [91 %-97 %] 92 %  BP: ()/(50-75) 105/60   Weight: 94.5 kg (208 lb 5.4 oz)  Body mass index is 29.89 kg/m².      Intake/Output Summary (Last 24 hours) at 11/11/2022 2013  Last data filed at 11/11/2022 1905  Gross per 24 hour   Intake 1069.9 ml   Output 357 ml   Net 712.9 ml       Physical Exam  Constitutional:       Appearance: He is ill-appearing. He is not toxic-appearing.      Interventions: He is sedated and intubated.   HENT:      Head: Normocephalic and atraumatic.      Mouth/Throat:      Mouth: Mucous membranes are moist.   Eyes:      Conjunctiva/sclera: Conjunctivae normal.      Pupils: Pupils are equal, round, and reactive to light.   Cardiovascular:      Rate and Rhythm: Normal rate and regular rhythm.      Heart sounds: Normal heart sounds.   Pulmonary:      Effort: Pulmonary effort is normal. He is intubated.      Breath sounds: Rales (bibasilar) present.   Abdominal:      General: There is no distension.      Palpations: Abdomen is soft.       Tenderness: There is no abdominal tenderness. There is no guarding.   Musculoskeletal:         General: Normal range of motion.      Cervical back: Normal range of motion and neck supple.      Right lower leg: Edema present.      Left lower leg: Edema present.   Skin:     General: Skin is warm and dry.   Neurological:      General: No focal deficit present.      Sensory: No sensory deficit.      Comments: Facial droop   Psychiatric:      Comments: Unable to assess     Vents:  Vent Mode: A/CMV-VC (11/11/22 1950)  Set Rate: 18 BPM (11/11/22 1950)  Vt Set: 475 mL (11/11/22 1950)  PEEP/CPAP: 6.3 cmH20 (11/11/22 1950)  Oxygen Concentration (%): 50 (11/11/22 1950)  Peak Airway Pressure: 35.2 cmH2O (11/11/22 1950)  Plateau Pressure: 16.5 cmH20 (11/11/22 1950)  Total Ve: 9.67 L/m (11/11/22 1950)  F/VT Ratio<105 (RSBI): (!) 37.59 (11/11/22 1950)  Lines/Drains/Airways       Drain  Duration                  NG/OG Tube 11/09/22 0805 orogastric 16 Fr. Right mouth 2 days         Urethral Catheter 11/09/22 0815 Non-latex 14 Fr. 2 days              Airway  Duration                  Airway - Non-Surgical 11/09/22 0804 Endotracheal Tube 2 days              Peripheral Intravenous Line  Duration                  Peripheral IV - Single Lumen 11/08/22 2050 20 G Distal;Left;Posterior Forearm 2 days         Peripheral IV - Single Lumen 11/09/22 0500 20 G Posterior;Right Forearm 2 days         Peripheral IV - Single Lumen 11/09/22 0900 18 G Anterior;Distal;Left Upper Arm 2 days         Peripheral IV - Single Lumen 11/10/22 0044 18 G Right Forearm 1 day                  Significant Labs:    CBC/Anemia Profile:  Recent Labs   Lab 11/10/22  0426 11/11/22  0531   WBC 10.38 14.43*   HGB 14.1 14.3   HCT 42.7 41.0   PLT 58* 90*   MCV 92 89   RDW 12.7 13.3        Chemistries:  Recent Labs   Lab 11/10/22  0426 11/10/22  0758 11/11/22  0531 11/11/22  1248 11/11/22  1811   *   < > 130* 129* 130*   K 3.6   < > 4.5 4.2 3.9      < > 95 95 94*    CO2 17*   < > 19* 18* 18*   BUN 66*   < > 87* 94* 97*   CREATININE 3.2*   < > 4.9* 5.4* 5.6*   CALCIUM 8.1*   < > 8.3* 8.0* 8.1*   MG 2.9*  --  3.0*  --   --    PHOS  --   --  4.1  --   --     < > = values in this interval not displayed.       All pertinent labs within the past 24 hours have been reviewed.    Significant Imaging:  I have reviewed all pertinent imaging results/findings within the past 24 hours.  I have reviewed and interpreted all pertinent imaging results/findings within the past 24 hours.      ABG  Recent Labs   Lab 11/10/22  0543   PH 7.326*   PO2 70*   PCO2 43.9   HCO3 23.0*   BE -3     Assessment/Plan:     Pulmonary  Acute hypoxemic respiratory failure  Acute hypoxemic respiratory failure due to pulmonary edema from suspected HFpEF (orthopnea, dyspnea, lower extremities edema, bi-basal crepitus and B lines on US) vs COPD exacerbation (significant history of smoking and emphysematous changes on CT scan), and pneumonia    ECHO showed mild concentric LVH, EF 55%, Mild right ventricular enlargement with normal right ventricular systolic function, PASP 29 and CVP of 3    Recommendations:  Continue ventilatory support   Continue DuoNebs  Continue IV steroid  Continue culture directed antibiotics      Cardiac/Vascular  Acute bacterial endocarditis  Continue antibiotic  Follow up ID recommendation    Atrial fibrillation with RVR  Continue amiodarone  Metoprolol added    Benign essential HTN  Continue home dose of amlodipine  Monitor blood pressure    Renal/  NADIYA (acute kidney injury)  Likely due to ATN  Minimal urine output and creatinine rising  Give additional dose of lasix today  Nephrology input appreciated      ID  Septic shock  Continue antibiotic  Off pressor      Endocrine  * DKA (diabetic ketoacidosis)  Mild DKA, resolved  New diagnosis of Diabetes Mellitus to patient, of note, HBA1c last year was 7.3, now > 14    Transition to SQ insulin  Blood glucose not at goal, scheduled short acting  insulin added  Continue POC glucose monitoring    Thrombocytopenia  HIT score of 1 (low probability for HIT), Likely due to sepsis  B12 and folate acid level normal  Improving, continue to monitor     Critical Care Daily Checklist:    A: Awake: RASS Goal/Actual Goal: RASS Goal: -2-->light sedation  Actual: [place]iano Agitation Sedation Scale (RASS): Light sedation   B: Spontaneous Breathing Trial Performed?  Yes   C: SAT & SBT Coordinated?  Yes                     D: Delirium: CAM-ICU Overall CAM-ICU: Positive   E: Early Mobility Performed? No   F: Feeding Goal: Start TF    AS: Analgesia/Sedation On propofol   T: Thromboembolic Prophylaxis Enoxaparin   H: HOB > 300 {YES   U: Stress Ulcer Prophylaxis (if needed) PPI   G: Glucose Control Not at goal, scheduled short acting added   B: Bowel Function Stool Occurrence: 1   I: Indwelling Catheter (Lines & Tran) Necessity Indicated   D: De-escalation of Antimicrobials/Pharmacotherapies Yes    Plan for the day/ETD SANTA    Code Status:  Family/Goals of Care: Full Code         Critical secondary to Patient has a condition that poses threat to life and bodily function: Severe Respiratory Distress      Critical care was time spent personally by me on the following activities: development of treatment plan with patient or surrogate and bedside caregivers, discussions with consultants, evaluation of patient's response to treatment, examination of patient, ordering and performing treatments and interventions, ordering and review of laboratory studies, ordering and review of radiographic studies, pulse oximetry, re-evaluation of patient's condition. This critical care time did not overlap with that of any other provider or involve time for any procedures.     Naty Cazares MD  Critical Care Medicine  [facility] - Intensive Care    Pt seen and examined with Pulmonary/Critical Care team. Critical Care time was spent validating the history and physical exam, reviewing the lab and  imaging results, and discussing the care of the patient with the bedside nurse. The following additional comments are made:    We have been able to wean off vasopressors. New interval finding is aortic valve vegetation. In retrospect, pt may have had Staph pneumonia of endocarditis when he arrived.  Hopefully NADIYA will resolve now that pt is more stable.  No indication for dialysis today. We will perform SBT tomorrow if he remains stable.    Critical Care time 40 minutes    Darrius Felton MD  Phone [phone]

## 2022-11-12 NOTE — ASSESSMENT & PLAN NOTE
Acute hypoxemic respiratory failure due to pulmonary edema from suspected HFpEF (orthopnea, dyspnea, lower extremities edema, bi-basal crepitus and B lines on US) vs COPD exacerbation (significant history of smoking and emphysematous changes on CT scan), and pneumonia    ECHO showed mild concentric LVH, EF 55%, Mild right ventricular enlargement with normal right ventricular systolic function, PASP 29 and CVP of 3    Recommendations:  Continue ventilatory support   Continue DuoNebs  Continue IV steroid  Continue culture directed antibiotics

## 2022-11-12 NOTE — EICU
Rounding (Video Assessment):  No    Intervention Initiated From:  Bedside    Maryjo Communicated with Bedside Nurse regarding:  Time-Out    Nurse Notified:  Yes    Doctor Notified:  Yes    Comments: Caled into patient room remotely for a timeout for a Trialysis line placement.  Dr. Cazares successfully placed a R IJ trialysis line.

## 2022-11-12 NOTE — SUBJECTIVE & OBJECTIVE
Interval History/Significant Events:   No acute overnight event. Patient is afebrile, off pressor. Patient had episode of atrial fib with RVR and metoprolol was added.   Underwent SANTA today that showed AV with mobile Vegetation/mass.      Review of Systems Unable to obtain  Objective:     Vital Signs (Most Recent):  Temp: 98.5 °F (36.9 °C) (11/11/22 1905)  Pulse: (!) 122 (11/11/22 1950)  Resp: (!) 25 (11/11/22 1950)  BP: 105/60 (11/11/22 1950)  SpO2: (!) 92 % (11/11/22 1950)   Vital Signs (24h Range):  Temp:  [97.3 °F (36.3 °C)-98.5 °F (36.9 °C)] 98.5 °F (36.9 °C)  Pulse:  [] 122  Resp:  [18-42] 25  SpO2:  [91 %-97 %] 92 %  BP: ()/(50-75) 105/60   Weight: 94.5 kg (208 lb 5.4 oz)  Body mass index is 29.89 kg/m².      Intake/Output Summary (Last 24 hours) at 11/11/2022 2013  Last data filed at 11/11/2022 1905  Gross per 24 hour   Intake 1069.9 ml   Output 357 ml   Net 712.9 ml       Physical Exam  Constitutional:       Appearance: He is ill-appearing. He is not toxic-appearing.      Interventions: He is sedated and intubated.   HENT:      Head: Normocephalic and atraumatic.      Mouth/Throat:      Mouth: Mucous membranes are moist.   Eyes:      Conjunctiva/sclera: Conjunctivae normal.      Pupils: Pupils are equal, round, and reactive to light.   Cardiovascular:      Rate and Rhythm: Normal rate and regular rhythm.      Heart sounds: Normal heart sounds.   Pulmonary:      Effort: Pulmonary effort is normal. He is intubated.      Breath sounds: Rales (bibasilar) present.   Abdominal:      General: There is no distension.      Palpations: Abdomen is soft.      Tenderness: There is no abdominal tenderness. There is no guarding.   Musculoskeletal:         General: Normal range of motion.      Cervical back: Normal range of motion and neck supple.      Right lower leg: Edema present.      Left lower leg: Edema present.   Skin:     General: Skin is warm and dry.   Neurological:      General: No focal deficit  present.      Sensory: No sensory deficit.      Comments: Facial droop   Psychiatric:      Comments: Unable to assess     Vents:  Vent Mode: A/CMV-VC (11/11/22 1950)  Set Rate: 18 BPM (11/11/22 1950)  Vt Set: 475 mL (11/11/22 1950)  PEEP/CPAP: 6.3 cmH20 (11/11/22 1950)  Oxygen Concentration (%): 50 (11/11/22 1950)  Peak Airway Pressure: 35.2 cmH2O (11/11/22 1950)  Plateau Pressure: 16.5 cmH20 (11/11/22 1950)  Total Ve: 9.67 L/m (11/11/22 1950)  F/VT Ratio<105 (RSBI): (!) 37.59 (11/11/22 1950)  Lines/Drains/Airways       Drain  Duration                  NG/OG Tube 11/09/22 0805 orogastric 16 Fr. Right mouth 2 days         Urethral Catheter 11/09/22 0815 Non-latex 14 Fr. 2 days              Airway  Duration                  Airway - Non-Surgical 11/09/22 0804 Endotracheal Tube 2 days              Peripheral Intravenous Line  Duration                  Peripheral IV - Single Lumen 11/08/22 2050 20 G Distal;Left;Posterior Forearm 2 days         Peripheral IV - Single Lumen 11/09/22 0500 20 G Posterior;Right Forearm 2 days         Peripheral IV - Single Lumen 11/09/22 0900 18 G Anterior;Distal;Left Upper Arm 2 days         Peripheral IV - Single Lumen 11/10/22 0044 18 G Right Forearm 1 day                  Significant Labs:    CBC/Anemia Profile:  Recent Labs   Lab 11/10/22  0426 11/11/22  0531   WBC 10.38 14.43*   HGB 14.1 14.3   HCT 42.7 41.0   PLT 58* 90*   MCV 92 89   RDW 12.7 13.3        Chemistries:  Recent Labs   Lab 11/10/22  0426 11/10/22  0758 11/11/22  0531 11/11/22  1248 11/11/22  1811   *   < > 130* 129* 130*   K 3.6   < > 4.5 4.2 3.9      < > 95 95 94*   CO2 17*   < > 19* 18* 18*   BUN 66*   < > 87* 94* 97*   CREATININE 3.2*   < > 4.9* 5.4* 5.6*   CALCIUM 8.1*   < > 8.3* 8.0* 8.1*   MG 2.9*  --  3.0*  --   --    PHOS  --   --  4.1  --   --     < > = values in this interval not displayed.       All pertinent labs within the past 24 hours have been reviewed.    Significant Imaging:  I have reviewed  all pertinent imaging results/findings within the past 24 hours.  I have reviewed and interpreted all pertinent imaging results/findings within the past 24 hours.

## 2022-11-12 NOTE — PROGRESS NOTES
[facility] - Intensive Care  Cardiology  Progress Note    Patient Name: Jose Luis Rendon  MRN: [id number]  Admission Date: 11/7/2022  Hospital Length of Stay: 5 days  Code Status: Full Code   Attending Physician: Jermain Jackson, *   Primary Care Physician: Deric Armstrong MD  Expected Discharge Date:   Principal Problem:DKA (diabetic ketoacidosis)    Subjective:     Hospital Course:   11/12/2022: He has not been able to be extubated.  Current FiO 2 50%.  He is still markedly anuric.  He developed recurrent AFib yesterday.  He is back on IV amiodarone in sinus rhythm.  Due to hypotension he was briefly on vasopressor therapy overnight.  It is off now.  I do not appreciate any new physical exam findings.      Interval History:  Remains intubated.    Review of Systems   Reason unable to perform ROS: Intubated sedated.  Afebrile.  Objective:     Vital Signs (Most Recent):  Temp: 96.5 °F (35.8 °C) (11/12/22 0730)  Pulse: 65 (11/12/22 0738)  Resp: (!) 22 (11/12/22 0738)  BP: 118/68 (11/12/22 0630)  SpO2: 98 % (11/12/22 0645)   Vital Signs (24h Range):  Temp:  [96.5 °F (35.8 °C)-98.5 °F (36.9 °C)] 96.5 °F (35.8 °C)  Pulse:  [] 65  Resp:  [18-42] 22  SpO2:  [91 %-99 %] 98 %  BP: ()/(50-75) 118/68     Weight: 94.5 kg (208 lb 5.4 oz)  Body mass index is 29.89 kg/m².     SpO2: 98 %  O2 Device (Oxygen Therapy): ventilator      Intake/Output Summary (Last 24 hours) at 11/12/2022 0834  Last data filed at 11/12/2022 0700  Gross per 24 hour   Intake 1133.54 ml   Output 297 ml   Net 836.54 ml       Lines/Drains/Airways       Drain  Duration                  NG/OG Tube 11/09/22 0805 orogastric 16 Fr. Right mouth 3 days         Urethral Catheter 11/09/22 0815 Non-latex 14 Fr. 3 days              Airway  Duration                  Airway - Non-Surgical 11/09/22 0804 Endotracheal Tube 3 days              Peripheral Intravenous Line  Duration                  Peripheral IV - Single Lumen 11/08/22 2050 20 G Distal;Left;Posterior  Forearm 3 days         Peripheral IV - Single Lumen 11/09/22 0500 20 G Posterior;Right Forearm 3 days         Peripheral IV - Single Lumen 11/09/22 0900 18 G Anterior;Distal;Left Upper Arm 2 days         Peripheral IV - Single Lumen 11/10/22 0044 18 G Right Forearm 2 days                    Physical Exam  Constitutional:       Appearance: Normal appearance.   HENT:      Head: Normocephalic and atraumatic.      Nose: Nose normal.      Mouth/Throat:      Mouth: Mucous membranes are moist.      Pharynx: Oropharynx is clear.   Eyes:      Pupils: Pupils are equal, round, and reactive to light.   Cardiovascular:      Rate and Rhythm: Normal rate and regular rhythm.      Pulses:           Carotid pulses are 2+ on the right side and 2+ on the left side.       Radial pulses are 2+ on the right side and 2+ on the left side.   Pulmonary:      Effort: Pulmonary effort is normal.      Breath sounds: Normal breath sounds.   Abdominal:      General: Abdomen is flat. Bowel sounds are normal.      Palpations: Abdomen is soft.   Musculoskeletal:      Cervical back: Normal range of motion and neck supple.   Skin:     General: Skin is warm and dry.      Comments: Small left arm pustule noted       Significant Labs: Blood Culture:   Recent Labs   Lab 11/10/22  1627   LABBLOO No Growth to date  No Growth to date  Gram stain aer bottle: Gram positive cocci in clusters resembling Staph  Results called to and read back by:Radha Yañez RN 11/11/2022  17:55   , BMP:   Recent Labs   Lab 11/11/22  0531 11/11/22  1248 11/11/22  1811 11/11/22  2352 11/12/22  0621   *   < > 279* 249* 208*   *   < > 130* 129* 128*   K 4.5   < > 3.9 4.1 4.9   CL 95   < > 94* 94* 95   CO2 19*   < > 18* 19* 19*   BUN 87*   < > 97* 100* 107*   CREATININE 4.9*   < > 5.6* 6.1* 6.4*   CALCIUM 8.3*   < > 8.1* 7.9* 7.8*   MG 3.0*  --   --   --   --     < > = values in this interval not displayed.   , CBC   Recent Labs   Lab 11/11/22  0531 11/12/22  0621    WBC 14.43* 14.58*   HGB 14.3 13.4*   HCT 41.0 37.9*   PLT 90* 113*   , and All pertinent lab results from the last 24 hours have been reviewed.    Significant Imaging:     Assessment and Plan:     Brief HPI:  Presentation with shortness of breath and chest pain initially subsequent respiratory failure and blood culture positivity.  SANTA confirms aortic valve vegetation growth.    * DKA (diabetic ketoacidosis)  A newly diagnosed condition.  Hemoglobin A1c 14  -condition improved.    Acute bacterial endocarditis  Infectious disease is following.  SANTA confirms vegetation on the aortic valve.  -no aortic regurgitation on transesophageal echocardiography.  I did not appreciate other valvular involvement.    Chest pain of uncertain etiology  There were complaints of chest pain on admission.  He had a negative Lexiscan in 2021.  There was normal ejection fraction without infarct pattern or reversible ischemia noted.  His Electrocardiogram showed right bundle branch block with left axis deviation.  During AFib and tachycardia he did not have marked ischemic ST changes.  There is low suspicion for chest pain related to coronary artery disease based on presentation.    Acute renal failure with acute tubular necrosis superimposed on stage 3a chronic kidney disease  Oliguric picture.  Nephrology following.  -persistent oliguric picture today.    Bacteremia due to group B Streptococcus  Staph aureus is growing as well.  Aortic valve vegetation confirmed.    Atrial fibrillation with RVR  New onset during this admission.  He is now in sinus rhythm.  Amiodarone conversion carried out.  Currently he is not on full anticoagulation.  There were no intracardiac thrombi on transesophageal ECHO.  -I will start NG/p.o. amiodarone today.  Clearly he has high recurrence risk.    COPD with acute exacerbation  He quit smoking in 2015.  Condition stable.    Acute hypoxemic respiratory failure  He remains intubated at this time.  Sedation was  lightened up yesterday and he seems to be with appropriate behavior.  -there are no plans to consider extubation at this time.  FiO 2 50% this morning.      Benign essential HTN  He has a history of hypertension treated with amlodipine as an outpatient  -currently off antihypertensive therapy with intermittent necessity for vasopressor treatment.        VTE Risk Mitigation (From admission, onward)         Ordered     enoxaparin injection 30 mg  Daily         11/10/22 1155     IP VTE LOW RISK PATIENT  Once         11/07/22 1231     Place sequential compression device  Until discontinued         11/07/22 1231                Merrill Price MD  Cardiology  [facility] - Intensive Care

## 2022-11-12 NOTE — PLAN OF CARE
Patient on ventilator with documented settings.  Patient intubated with 8 ETT secured at 23 cm at the lip.  Alarms are adequately set and properly functioning.  AMBU bag and mask at bedside.  Will continue to monitor.

## 2022-11-12 NOTE — ASSESSMENT & PLAN NOTE
Mild DKA, resolved  New diagnosis of Diabetes Mellitus to patient, of note, HBA1c last year was 7.3, now > 14    Transition to SQ insulin  Blood glucose not at goal, scheduled short acting insulin added  Continue POC glucose monitoring

## 2022-11-12 NOTE — SUBJECTIVE & OBJECTIVE
Interval History/Significant Events:   No acute overnight event.   Patient is off pressor.  Minimal urine output with the lasix    Review of Systems Unable to obtain  Objective:     Vital Signs (Most Recent):  Temp: 97 °F (36.1 °C) (11/12/22 1630)  Pulse: 64 (11/12/22 1745)  Resp: (!) 21 (11/12/22 1730)  BP: (!) 103/57 (11/12/22 1745)  SpO2: 97 % (11/12/22 1730)   Vital Signs (24h Range):  Temp:  [96.5 °F (35.8 °C)-98.5 °F (36.9 °C)] 97 °F (36.1 °C)  Pulse:  [] 64  Resp:  [18-37] 21  SpO2:  [92 %-99 %] 97 %  BP: ()/(50-68) 103/57   Weight: 94.5 kg (208 lb 5.4 oz)  Body mass index is 29.89 kg/m².      Intake/Output Summary (Last 24 hours) at 11/12/2022 1755  Last data filed at 11/12/2022 1600  Gross per 24 hour   Intake 849.5 ml   Output 230 ml   Net 619.5 ml       Physical Exam  Constitutional:       Appearance: He is ill-appearing. He is not toxic-appearing.      Interventions: He is sedated and intubated.   HENT:      Head: Normocephalic and atraumatic.      Mouth/Throat:      Mouth: Mucous membranes are moist.   Eyes:      Conjunctiva/sclera: Conjunctivae normal.      Pupils: Pupils are equal, round, and reactive to light.   Cardiovascular:      Rate and Rhythm: Normal rate and regular rhythm.      Heart sounds: Normal heart sounds.   Pulmonary:      Effort: Pulmonary effort is normal. He is intubated.      Breath sounds: Rales (bibasilar) present.   Abdominal:      General: There is no distension.      Palpations: Abdomen is soft.      Tenderness: There is no abdominal tenderness. There is no guarding.   Musculoskeletal:         General: Normal range of motion.      Cervical back: Normal range of motion and neck supple.      Right lower leg: Edema present.      Left lower leg: Edema present.   Skin:     General: Skin is warm and dry.   Neurological:      General: No focal deficit present.      Sensory: No sensory deficit.      Comments: Facial droop   Psychiatric:      Comments: Unable to assess      Vents:  Vent Mode: A/CMV-VC (11/12/22 1715)  Set Rate: 18 BPM (11/12/22 1715)  Vt Set: 470 mL (11/12/22 1715)  PEEP/CPAP: 6.9 cmH20 (11/12/22 1715)  Oxygen Concentration (%): 40 (11/12/22 1730)  Peak Airway Pressure: 24.1 cmH2O (11/12/22 1715)  Plateau Pressure: 13.6 cmH20 (11/12/22 0904)  Total Ve: 9.58 L/m (11/12/22 1715)  F/VT Ratio<105 (RSBI): (!) 46.05 (11/12/22 1715)  Lines/Drains/Airways       Central Venous Catheter Line  Duration             Trialysis (Dialysis) Catheter 11/12/22 1324 right internal jugular <1 day              Drain  Duration                  NG/OG Tube 11/09/22 0805 orogastric 16 Fr. Right mouth 3 days         Urethral Catheter 11/09/22 0815 Non-latex 14 Fr. 3 days              Airway  Duration                  Airway - Non-Surgical 11/09/22 0804 Endotracheal Tube 3 days              Peripheral Intravenous Line  Duration                  Peripheral IV - Single Lumen 11/08/22 2050 20 G Distal;Left;Posterior Forearm 3 days         Peripheral IV - Single Lumen 11/09/22 0500 20 G Posterior;Right Forearm 3 days         Peripheral IV - Single Lumen 11/09/22 0900 18 G Anterior;Distal;Left Upper Arm 3 days         Peripheral IV - Single Lumen 11/10/22 0044 18 G Right Forearm 2 days                  Significant Labs:    CBC/Anemia Profile:  Recent Labs   Lab 11/11/22  0531 11/12/22  0621   WBC 14.43* 14.58*   HGB 14.3 13.4*   HCT 41.0 37.9*   PLT 90* 113*   MCV 89 88   RDW 13.3 13.4        Chemistries:  Recent Labs   Lab 11/11/22  0531 11/11/22  1248 11/11/22  2352 11/12/22  0621 11/12/22  1226   *   < > 129* 128* 128*   K 4.5   < > 4.1 4.9 4.5   CL 95   < > 94* 95 92*   CO2 19*   < > 19* 19* 12*   BUN 87*   < > 100* 107* 112*   CREATININE 4.9*   < > 6.1* 6.4* 6.4*   CALCIUM 8.3*   < > 7.9* 7.8* 7.7*   MG 3.0*  --   --   --   --    PHOS 4.1  --   --   --   --     < > = values in this interval not displayed.       All pertinent labs within the past 24 hours have been  reviewed.    Significant Imaging:  I have reviewed all pertinent imaging results/findings within the past 24 hours.  I have reviewed and interpreted all pertinent imaging results/findings within the past 24 hours.

## 2022-11-12 NOTE — HOSPITAL COURSE
11/12/2022: He has not been able to be extubated.  Current FiO 2 50%.  He is still markedly anuric.  He developed recurrent AFib yesterday.  He is back on IV amiodarone in sinus rhythm.  Due to hypotension he was briefly on vasopressor therapy overnight.  It is off now.  I do not appreciate any new physical exam findings.

## 2022-11-12 NOTE — PROCEDURES
"Jose Luis Rendon is a 67 y.o. male patient.    Temp: 97.1 °F (36.2 °C) (11/12/22 1145)  Pulse: 67 (11/12/22 1310)  Resp: (!) 29 (11/12/22 1310)  BP: (!) 111/59 (11/12/22 1130)  SpO2: 96 % (11/12/22 1310)  Weight: 94.5 kg (208 lb 5.4 oz) (11/11/22 1317)  Height: 5' 10" (177.8 cm) (11/11/22 1317)       Central Line    Date/Time: 11/12/2022 3:34 PM  Performed by: Naty Cazares MD  Authorized by: Mahin Cramer MD     Location procedure was performed:  [facility] ICU  Pre-operative diagnosis:  Renal failure  Post-operative diagnosis:  Renal failure  Consent Done ?:  Yes  Time out complete?: Verified correct patient, procedure, equipment, staff, and site/side    Indications:  Hemodynamic monitoring and hemodialysis  Anesthesia:  General anesthesia and see MAR for details  Preparation:  Skin prepped with ChloraPrep  Skin prep agent dried: Skin prep agent completely dried prior to procedure    Sterile barriers: All five maximal sterile barriers used - gloves, gown, cap, mask and large sterile sheet    Hand hygiene: Hand hygiene performed immediately prior to central venous catheter insertion    Location:  Right internal jugular  Catheter type:  Trialysis  Catheter size:  13 Fr  Inserted Catheter Length (cm):  15  Ultrasound guidance: Yes    Vessel Caliber:  Large   patent  Comprressibility:  Normal  Needle advanced into vessel with real time ultrasound guidance.    Guidewire confirmed in vessel.    Steril sheath on probe.    Sterile gel used.  Manometry: No    Number of attempts:  1  Securement:  Line sutured, chlorhexidine patch, sterile dressing applied and blood return through all ports  Complications: No    Estimated blood loss (mL):  1  XRay:  Placement verified by x-ray  Adverse Events:  NoneTermination Site: superior vena cava    11/12/2022    Supervised by me.  Darrius Cramer MD  Phone [phone]    "

## 2022-11-12 NOTE — SUBJECTIVE & OBJECTIVE
Interval History:  Briefly on pressors overnight, still without improvement in renal function.  SANTA yesterday revealed aortic valve vegetation.  He has not yet cleared blood cultures.  Discussed with Nephrology and will likely need to initiate dialysis today.  Discussed with the patient's family at the bedside and answered all questions.    Review of Systems   Unable to perform ROS: Intubated   Objective:     Vital Signs (Most Recent):  Temp: 97.1 °F (36.2 °C) (11/12/22 1145)  Pulse: 70 (11/12/22 1100)  Resp: 20 (11/12/22 1100)  BP: (!) 115/59 (11/12/22 1100)  SpO2: 96 % (11/12/22 1100)   Vital Signs (24h Range):  Temp:  [96.5 °F (35.8 °C)-98.5 °F (36.9 °C)] 97.1 °F (36.2 °C)  Pulse:  [] 70  Resp:  [18-42] 20  SpO2:  [92 %-99 %] 96 %  BP: ()/(50-72) 115/59     Weight: 94.5 kg (208 lb 5.4 oz)  Body mass index is 29.89 kg/m².    Intake/Output Summary (Last 24 hours) at 11/12/2022 1158  Last data filed at 11/12/2022 0700  Gross per 24 hour   Intake 1016.41 ml   Output 257 ml   Net 759.41 ml        Physical Exam  Constitutional:       Appearance: He is ill-appearing. He is not toxic-appearing.      Interventions: He is sedated and intubated.   HENT:      Head: Normocephalic and atraumatic.      Mouth/Throat:      Mouth: Mucous membranes are moist.   Eyes:      Conjunctiva/sclera: Conjunctivae normal.      Pupils: Pupils are equal, round, and reactive to light.   Cardiovascular:      Rate and Rhythm: Normal rate and regular rhythm.      Heart sounds: Normal heart sounds.   Pulmonary:      Effort: Pulmonary effort is normal. He is intubated.      Breath sounds: Rales (bibasilar) present.   Abdominal:      General: There is no distension.      Palpations: Abdomen is soft.      Tenderness: There is no abdominal tenderness. There is no guarding.   Musculoskeletal:         General: Normal range of motion.      Cervical back: Normal range of motion and neck supple.      Right lower leg: Edema present.      Left  lower leg: Edema present.   Skin:     General: Skin is warm and dry.   Neurological:      General: No focal deficit present.      Sensory: No sensory deficit.      Comments: Facial droop   Psychiatric:      Comments: Unable to assess       Significant Labs: All pertinent labs within the past 24 hours have been reviewed.    Significant Imaging: I have reviewed all pertinent imaging results/findings within the past 24 hours.

## 2022-11-12 NOTE — ASSESSMENT & PLAN NOTE
Likely due to ATN  Minimal urine output and creatinine rising  Give additional dose of lasix today  Nephrology input appreciated

## 2022-11-12 NOTE — ASSESSMENT & PLAN NOTE
He remains intubated at this time.  Sedation was lightened up yesterday and he seems to be with appropriate behavior.  -there are no plans to consider extubation at this time.  FiO 2 50% this morning.

## 2022-11-12 NOTE — PROGRESS NOTES
[facility] - Intensive Care  Critical Care Medicine  Progress Note    Patient Name: Jose Luis Rendon  MRN: [id number]  Admission Date: 11/7/2022  Hospital Length of Stay: 5 days  Code Status: Full Code  Attending Provider: Jermain Jackson, *  Primary Care Provider: Deric Armstrong MD   Principal Problem: MSSA bacteremia    Subjective:     HPI:  67 year old male with history of HTN, lacunar infarct, anxiety and Bell's Palsy that presented to the ED for the evaluation fo 3 days worsening exertional SOB, 1 pillow orthopnea and left sided shoulder pain that radiates to back and chest pain. Patient discloses lower extremities edema but no cough or fever. Patient discloses no history of heart disease or lung disease.   Patient has significant past history of smoking (45 year pack history; stopped in 2015).  On presentation, patient was found to have mild DKA and started on IVF and insulin drip.   MICU was called for desaturation. Examination remarkable for bi-basilar crackles and lower extremities pitting edema.   CXR showed increased interstitial markings  Troponin is negative. EKG showed RBBB (old) and T wave abnormality in the inferior leads (new)  Review of old imagings, CT scan of 10/28/2021 showed centrilobular emphysema with an upper lobe predominance and multiple bilateral solid pulmonary nodules.  Bed side Cardiac and lung US showed bilateral b lines (right > left) with hyper-dynamic heart  IVF was discontinued and one dose of IV lasix ordered      Interval History/Significant Events:   No acute overnight event.   Patient is off pressor.  Minimal urine output with the lasix    Review of Systems Unable to obtain  Objective:     Vital Signs (Most Recent):  Temp: 97 °F (36.1 °C) (11/12/22 1630)  Pulse: 64 (11/12/22 1745)  Resp: (!) 21 (11/12/22 1730)  BP: (!) 103/57 (11/12/22 1745)  SpO2: 97 % (11/12/22 1730)   Vital Signs (24h Range):  Temp:  [96.5 °F (35.8 °C)-98.5 °F (36.9 °C)] 97 °F (36.1 °C)  Pulse:  []  64  Resp:  [18-37] 21  SpO2:  [92 %-99 %] 97 %  BP: ()/(50-68) 103/57   Weight: 94.5 kg (208 lb 5.4 oz)  Body mass index is 29.89 kg/m².      Intake/Output Summary (Last 24 hours) at 11/12/2022 1755  Last data filed at 11/12/2022 1600  Gross per 24 hour   Intake 849.5 ml   Output 230 ml   Net 619.5 ml       Physical Exam  Constitutional:       Appearance: He is ill-appearing. He is not toxic-appearing.      Interventions: He is sedated and intubated.   HENT:      Head: Normocephalic and atraumatic.      Mouth/Throat:      Mouth: Mucous membranes are moist.   Eyes:      Conjunctiva/sclera: Conjunctivae normal.      Pupils: Pupils are equal, round, and reactive to light.   Cardiovascular:      Rate and Rhythm: Normal rate and regular rhythm.      Heart sounds: Normal heart sounds.   Pulmonary:      Effort: Pulmonary effort is normal. He is intubated.      Breath sounds: Rales (bibasilar) present.   Abdominal:      General: There is no distension.      Palpations: Abdomen is soft.      Tenderness: There is no abdominal tenderness. There is no guarding.   Musculoskeletal:         General: Normal range of motion.      Cervical back: Normal range of motion and neck supple.      Right lower leg: Edema present.      Left lower leg: Edema present.   Skin:     General: Skin is warm and dry.   Neurological:      General: No focal deficit present.      Sensory: No sensory deficit.      Comments: Facial droop   Psychiatric:      Comments: Unable to assess     Vents:  Vent Mode: A/CMV-VC (11/12/22 1715)  Set Rate: 18 BPM (11/12/22 1715)  Vt Set: 470 mL (11/12/22 1715)  PEEP/CPAP: 6.9 cmH20 (11/12/22 1715)  Oxygen Concentration (%): 40 (11/12/22 1730)  Peak Airway Pressure: 24.1 cmH2O (11/12/22 1715)  Plateau Pressure: 13.6 cmH20 (11/12/22 0904)  Total Ve: 9.58 L/m (11/12/22 1715)  F/VT Ratio<105 (RSBI): (!) 46.05 (11/12/22 1715)  Lines/Drains/Airways       Central Venous Catheter Line  Duration             Trialysis  (Dialysis) Catheter 11/12/22 1324 right internal jugular <1 day              Drain  Duration                  NG/OG Tube 11/09/22 0805 orogastric 16 Fr. Right mouth 3 days         Urethral Catheter 11/09/22 0815 Non-latex 14 Fr. 3 days              Airway  Duration                  Airway - Non-Surgical 11/09/22 0804 Endotracheal Tube 3 days              Peripheral Intravenous Line  Duration                  Peripheral IV - Single Lumen 11/08/22 2050 20 G Distal;Left;Posterior Forearm 3 days         Peripheral IV - Single Lumen 11/09/22 0500 20 G Posterior;Right Forearm 3 days         Peripheral IV - Single Lumen 11/09/22 0900 18 G Anterior;Distal;Left Upper Arm 3 days         Peripheral IV - Single Lumen 11/10/22 0044 18 G Right Forearm 2 days                  Significant Labs:    CBC/Anemia Profile:  Recent Labs   Lab 11/11/22  0531 11/12/22  0621   WBC 14.43* 14.58*   HGB 14.3 13.4*   HCT 41.0 37.9*   PLT 90* 113*   MCV 89 88   RDW 13.3 13.4        Chemistries:  Recent Labs   Lab 11/11/22  0531 11/11/22  1248 11/11/22  2352 11/12/22  0621 11/12/22  1226   *   < > 129* 128* 128*   K 4.5   < > 4.1 4.9 4.5   CL 95   < > 94* 95 92*   CO2 19*   < > 19* 19* 12*   BUN 87*   < > 100* 107* 112*   CREATININE 4.9*   < > 6.1* 6.4* 6.4*   CALCIUM 8.3*   < > 7.9* 7.8* 7.7*   MG 3.0*  --   --   --   --    PHOS 4.1  --   --   --   --     < > = values in this interval not displayed.       All pertinent labs within the past 24 hours have been reviewed.    Significant Imaging:  I have reviewed all pertinent imaging results/findings within the past 24 hours.  I have reviewed and interpreted all pertinent imaging results/findings within the past 24 hours.      ABG  Recent Labs   Lab 11/10/22  0543   PH 7.326*   PO2 70*   PCO2 43.9   HCO3 23.0*   BE -3     Assessment/Plan:   Pulmonary  Acute hypoxemic respiratory failure  Acute hypoxemic respiratory failure due to COPD exacerbation and pneumonia     ECHO showed mild concentric  LVH, EF 55%, Mild right ventricular enlargement with normal right ventricular systolic function, PASP 29 and CVP of 3     Recommendations:  Continue ventilatory support   Continue DuoNebs  Status post IV steroid  Continue culture directed antibiotics        Cardiac/Vascular  Acute bacterial endocarditis  Continue cefazolin     Atrial fibrillation with RVR  Continue amiodarone  Metoprolol added       Renal/  NADIYA (acute kidney injury)  Likely due to ATN  Minimal urine output and creatinine rising  Dialysis catheter placed for initiation of dialysis     Hypervolemic hyponatremia  Dialysis should help     ID  Septic shock, resolved  Continue antibiotic  Off pressor        Endocrine  * DKA (diabetic ketoacidosis)  Mild DKA, resolved  New diagnosis of Diabetes Mellitus to patient, of note, HBA1c last year was 7.3, now > 14     Improving glycemic control  Continue insulin  Continue POC glucose monitoring  Now off steroid, glucose control should be improve     Thrombocytopenia  HIT score of 1 (low probability for HIT), Likely due to sepsis  B12 and folate acid level normal  Improving, continue to monitor           Critical Care Daily Checklist:     A: Awake: RASS Goal/Actual Goal: RASS Goal: -2-->light sedation  Actual: [place] Agitation Sedation Scale (RASS): Light sedation   B: Spontaneous Breathing Trial Performed?  Yes   C: SAT & SBT Coordinated?  Yes                     D: Delirium: CAM-ICU Overall CAM-ICU: Positive   E: Early Mobility Performed? No   F: Feeding Goal: Start TF    AS: Analgesia/Sedation On propofol   T: Thromboembolic Prophylaxis Enoxaparin   H: HOB > 300 {YES   U: Stress Ulcer Prophylaxis (if needed) PPI   G: Glucose Control Improving on insulin   B: Bowel Function Stool Occurrence: 1   I: Indwelling Catheter (Lines & Tran) Necessity Indicated   D: De-escalation of Antimicrobials/Pharmacotherapies Yes     Plan for the day/ETD Initiate dialysis     Code Status:  Family/Goals of Care: Full Code   Son  updated at bed side            Critical secondary to Patient has a condition that poses threat to life and bodily function: Severe Respiratory Distress      Critical care was time spent personally by me on the following activities: development of treatment plan with patient or surrogate and bedside caregivers, discussions with consultants, evaluation of patient's response to treatment, examination of patient, ordering and performing treatments and interventions, ordering and review of laboratory studies, ordering and review of radiographic studies, pulse oximetry, re-evaluation of patient's condition. This critical care time did not overlap with that of any other provider or involve time for any procedures.     Naty Cazares MD  Critical Care Medicine  [facility] - Intensive Care    Pt seen and examined with Pulmonary/Critical Care team. Critical Care time was spent validating the history and physical exam, reviewing the lab and imaging results, and discussing the care of the patient with the bedside nurse. The following additional comments are made:    Remains very ill. Fluid balance positive.  Oliguric.  Beginning dialysis. Glycemic control better. MSSA bacteremia. Will need 6 wks abx for his endocarditis.      Critical Care time 45 minutes    Darrius Felton MD  Phone [phone]

## 2022-11-12 NOTE — ASSESSMENT & PLAN NOTE
- suspect due to post-viral PNA with staph  - COVID and flu negative on admission  - on BSABx for now given acute decompensation  - empiric tamiflu stopped today  - staph bacteremia, switched antibiotics to cefazolin

## 2022-11-12 NOTE — ASSESSMENT & PLAN NOTE
As above-nightly BiPAP for in addition to prednisone, azithromycin  -decompensation prompting intubation  -continue steroids; on broad-spectrum antibiotics  -suspect may have staph pneumonia

## 2022-11-12 NOTE — ASSESSMENT & PLAN NOTE
- blood cultures growing staph (appears to be MSSA based off PCR) and strep  - repeat blood cultures for clearance  - admission TTE without vegetation; SANTA with vegetation  - on BSABx; de-escalated to cefazolin  - ID consult; will need 6 weeks IV antibiotics from date of culture clearance

## 2022-11-12 NOTE — ASSESSMENT & PLAN NOTE
- likely due to post-viral PNA with staph bacteremia and endocarditis  - likely distributive component due to sedation  - wean off levophed  - continue IV abx  - repeat BCx for clearance

## 2022-11-12 NOTE — PROGRESS NOTES
[facility] Progress Note    Assessment/Recommendations:     68yo man w/a history of HTN, prior CVA, Bell's palsy, COPD, and tobacco use who was admitted on 2022 with 4 days of CP, SOB, N/V, and malaise and was found to have polymicrobial bacteremia (MSSA, GBS) due to native AV endocarditis with associated DKA from newly diagnosed diabetes (A1c>14%). His course has been c/b hypoxic RF (requiring intubation), septic shock (with brief pressor requirement), AF w/RVR, (s/p amio load), and NADIYA. ID has been consulted to help tailor antibiotics and assist in source evaluation. He has weaned from pressors and improved on tailored cefazolin while awaiting culture clearance (uncertain source but possible indolent skin vs pulmonary source of polymicrobial bacteremia given organisms identified).     - would continue cefazolin 2g IV q12h for now (will adjust further if HD is started to match mode of dialysis; currently receiving lasix trial)  - no documented evidence of influenza and can stop tamiflu in setting of likely impending dialysis initiation  - await pending repeat blood cx to assess for clearance (drawn this morning)  - will require 6wk course of antibiotics -- no PICC or midline permanent placement until culture clearance has been documented  - will assess for sites of metastatic seeding of MSSA infection following extubation when full history can be obtained     ID will follow.     Allyssa Eid MD  [facility] ID Attending  [phone]  Subjective:      Repeat episode of AF and remains on amio. Brief pressor use overnight tolerate this agent. Remains intubated on stable vent support. Oliguria persists despite lasix trial -- uncertain plans for possible dialysis initiation.  SANTA with mobile AV mass consistent with native AV endocarditis.      Objective:   Last 24 Hour Vital Signs:  BP  Min: 78/51  Max: 124/69  Temp  Av.9 °F (36.6 °C)  Min: 96.5 °F (35.8 °C)  Max: 98.5 °F (36.9 °C)  Pulse  Av.7  " Min: 64  Max: 130  Resp  Av.4  Min: 18  Max: 42  SpO2  Av.8 %  Min: 91 %  Max: 99 %  Height  Av' 10" (177.8 cm)  Min: 5' 10" (177.8 cm)  Max: 5' 10" (177.8 cm)  Weight  Av.5 kg (208 lb 5.4 oz)  Min: 94.5 kg (208 lb 5.4 oz)  Max: 94.5 kg (208 lb 5.4 oz)  I/O last 3 completed shifts:  In: 1807.9 [I.V.:1177.4; NG/GT:270; IV Piggyback:360.4]  Out: 472 [Urine:472]    Physical Exam  Constitutional:       Appearance: He is ill-appearing.      Comments: Sedated on ventilator   HENT:      Head: Atraumatic.      Right Ear: External ear normal.      Left Ear: External ear normal.      Mouth/Throat:      Comments: ET tube in place  Cardiovascular:      Rate and Rhythm: Normal rate and regular rhythm.      Heart sounds: Normal heart sounds.   Pulmonary:      Comments: Air movement bilateral lungs on auscultation  Abdominal:      General: There is no distension.      Palpations: Abdomen is soft.   Musculoskeletal:         General: No deformity.   Skin:     General: Skin is warm.   Neurological:      Comments: Medically sedated. Unable to perform full neurological assessment      Vent Mode: A/CMV-VC  Oxygen Concentration (%):  [] 45  Resp Rate Total:  [19 br/min-29 br/min] 19 br/min  Vt Set:  [475 mL-500 mL] 475 mL  PEEP/CPAP:  [6.3 cmH20-7.8 cmH20] 7 cmH20  Mean Airway Pressure:  [9.7 mtZ15-02.3 cmH20] 9.8 cmH20      Laboratory:  Laboratory Data Reviewed: yes  Pertinent Findings:  Recent Labs   Lab 22  1118 22  1232 11/09/22  2007 11/09/22  2352 11/10/22  0426 11/10/22  0758 22  0531 22  1248 22  1811 22  2352 22  0621   WBC  --    < >  --   --  10.38  --  14.43*  --   --   --  14.58*   HGB  --    < >  --   --  14.1  --  14.3  --   --   --  13.4*   HCT  --    < >  --   --  42.7  --  41.0  --   --   --  37.9*   PLT  --    < >  --   --  58*  --  90*  --   --   --  113*   MCV  --    < >  --   --  92  --  89  --   --   --  88   RDW  --    < >  --   --  12.7  --  13.3 "  --   --   --  13.4   *   < > 131*   < > 134*   < > 130*   < > 130* 129* 128*   K 4.3   < > 4.0   < > 3.6   < > 4.5   < > 3.9 4.1 4.9   CL 97   < > 96   < > 100   < > 95   < > 94* 94* 95   CO2 12*   < > 18*   < > 17*   < > 19*   < > 18* 19* 19*   BUN 28*   < > 62*   < > 66*   < > 87*   < > 97* 100* 107*   CREATININE 1.4   < > 3.0*   < > 3.2*   < > 4.9*   < > 5.6* 6.1* 6.4*   *   < > 545*  545*   < > 264*   < > 290*   < > 279* 249* 208*   PROT 7.4  --  5.9*  --   --   --   --   --   --   --   --    ALBUMIN 3.2*  --  2.0*  --   --   --   --   --   --   --   --    BILITOT 1.6*  --  1.2*  --   --   --   --   --   --   --   --    AST 27  --  59*  --   --   --   --   --   --   --   --    ALKPHOS 97  --  131  --   --   --   --   --   --   --   --    ALT 47*  --  58*  --   --   --   --   --   --   --   --     < > = values in this interval not displayed.           Microbiology Data:  11/9 blood cx: MSSA/GBS in first sets; MSSA only in later sets  11/10 blood cx: MSSA  11/10 sputum cx MSSA  11/11 blood cx pending        Antimicrobials:  Cefazolin    Other Results:  Radiology Results:  X-Ray Chest 1 View    Result Date: 11/9/2022  EXAMINATION: XR CHEST 1 VIEW CLINICAL HISTORY: ETT placement; TECHNIQUE: Single frontal view of the chest was performed. COMPARISON: 11/08/2022 FINDINGS: Endotracheal tube tip projects approximately 5 cm above the mamie.  Enteric tube courses through the left upper quadrant however extends past field of view. Mediastinal structures are midline.  Stable cardiomediastinal silhouette.  Aortic calcification. Stable diffuse bilateral interstitial attenuation which could represent pulmonary edema or infection.  No new focal airspace opacity.  Slightly increased obscuration of the left hemidiaphragm and costophrenic angle suggestive for slightly increased volume left pleural effusion and atelectasis.  No pneumothorax.     As above. Electronically signed by: Sathish Candelaria Date:    11/09/2022  Time:    08:55    SANTA: mobile AV vegetations    Current Medications:     Infusions:   amiodarone in dextrose 5% 0.5 mg/min (11/12/22 0700)    NORepinephrine bitartrate-D5W Stopped (11/12/22 0054)    propofoL 35 mcg/kg/min (11/12/22 0742)        Scheduled:   albuterol-ipratropium  3 mL Nebulization Q6H WAKE    amiodarone  200 mg Oral BID    aspirin  81 mg Oral Daily    atorvastatin  40 mg Oral QHS    ceFAZolin (ANCEF) IVPB  2 g Intravenous Q12H    enoxaparin  30 mg Subcutaneous Daily    furosemide (LASIX) injection  20 mg Intravenous Once    insulin aspart U-100  10 Units Subcutaneous QID    insulin detemir U-100  30 Units Subcutaneous BID    methylPREDNISolone sodium succinate injection  60 mg Intravenous Q8H    metoprolol tartrate  25 mg Oral BID    mupirocin   Nasal BID    oseltamivir  30 mg Per OG tube Daily    pantoprazole  40 mg Intravenous Daily    polyethylene glycol  17 g Per NG tube Daily    senna-docusate 8.6-50 mg  1 tablet Oral BID    sodium chloride 0.9%  500 mL Intravenous Once        PRN:  sodium chloride 0.9%, sodium chloride 0.9%, acetaminophen, aluminum-magnesium hydroxide-simethicone, dextrose 10%, dextrose 10%, dextrose 10%, dextrose 10%, dextrose 10%, dextrose 10%, glucagon (human recombinant), glucagon (human recombinant), glucose, glucose, influenza 65up-adj, insulin aspart U-100, magnesium oxide, magnesium oxide, magnesium sulfate IVPB, melatonin, naloxone, ondansetron, oxyCODONE-acetaminophen, potassium bicarbonate, potassium bicarbonate, potassium bicarbonate, potassium chloride, potassium, sodium phosphates, potassium, sodium phosphates, simethicone, sodium chloride 0.9%, sodium chloride 0.9%

## 2022-11-12 NOTE — PLAN OF CARE
Problem: Adult Inpatient Plan of Care  Goal: Plan of Care Review  Outcome: Ongoing, Progressing  Goal: Patient-Specific Goal (Individualized)  Outcome: Ongoing, Progressing  Goal: Absence of Hospital-Acquired Illness or Injury  Outcome: Ongoing, Progressing  Goal: Optimal Comfort and Wellbeing  Outcome: Ongoing, Progressing     Problem: Diabetes Comorbidity  Goal: Blood Glucose Level Within Targeted Range  Outcome: Ongoing, Progressing     Problem: Adjustment to Illness COPD (Chronic Obstructive Pulmonary Disease)  Goal: Optimal Chronic Illness Coping  Outcome: Ongoing, Progressing     Problem: Functional Ability Impaired COPD (Chronic Obstructive Pulmonary Disease)  Goal: Optimal Level of Functional [place]  Outcome: Ongoing, Progressing     Problem: Respiratory Compromise COPD (Chronic Obstructive Pulmonary Disease)  Goal: Effective Oxygenation and Ventilation  Outcome: Ongoing, Progressing     Problem: Communication Impairment (Mechanical Ventilation, Invasive)  Goal: Effective Communication  Outcome: Ongoing, Progressing     Problem: Device-Related Complication Risk (Mechanical Ventilation, Invasive)  Goal: Optimal Device Function  Outcome: Ongoing, Progressing     Problem: Inability to Wean (Mechanical Ventilation, Invasive)  Goal: Mechanical Ventilation Liberation  Outcome: Ongoing, Progressing     Problem: Nutrition Impairment (Mechanical Ventilation, Invasive)  Goal: Optimal Nutrition Delivery  Outcome: Ongoing, Progressing     Problem: Skin and Tissue Injury (Mechanical Ventilation, Invasive)  Goal: Absence of Device-Related Skin and Tissue Injury  Outcome: Ongoing, Progressing     Problem: Ventilator-Induced Lung Injury (Mechanical Ventilation, Invasive)  Goal: Absence of Ventilator-Induced Lung Injury  Outcome: Ongoing, Progressing     Problem: Communication Impairment (Artificial Airway)  Goal: Effective Communication  Outcome: Ongoing, Progressing     Problem: Device-Related Complication Risk  (Artificial Airway)  Goal: Optimal Device Function  Outcome: Ongoing, Progressing     Problem: Skin and Tissue Injury (Artificial Airway)  Goal: Absence of Device-Related Skin or Tissue Injury  Outcome: Ongoing, Progressing     Problem: Infection  Goal: Absence of Infection Signs and Symptoms  Outcome: Ongoing, Progressing     Problem: Fall Injury Risk  Goal: Absence of Fall and Fall-Related Injury  Outcome: Ongoing, Progressing     Problem: Restraint, Nonbehavioral (Nonviolent)  Goal: Absence of Harm or Injury  Outcome: Ongoing, Progressing     Problem: Fluid Imbalance (Pneumonia)  Goal: Fluid Balance  Outcome: Ongoing, Progressing     Problem: Infection (Pneumonia)  Goal: Resolution of Infection Signs and Symptoms  Outcome: Ongoing, Progressing     Problem: Respiratory Compromise (Pneumonia)  Goal: Effective Oxygenation and Ventilation  Outcome: Ongoing, Progressing     Problem: Adjustment to Illness (Sepsis/Septic Shock)  Goal: Optimal Coping  Outcome: Ongoing, Progressing     Problem: Bleeding (Sepsis/Septic Shock)  Goal: Absence of Bleeding  Outcome: Ongoing, Progressing     Problem: Glycemic Control Impaired (Sepsis/Septic Shock)  Goal: Blood Glucose Level Within Desired Range  Outcome: Ongoing, Progressing     Problem: Infection Progression (Sepsis/Septic Shock)  Goal: Absence of Infection Signs and Symptoms  Outcome: Ongoing, Progressing     Problem: Nutrition Impaired (Sepsis/Septic Shock)  Goal: Optimal Nutrition Intake  Outcome: Ongoing, Progressing     Problem: Fluid and Electrolyte Imbalance (Acute Kidney Injury/Impairment)  Goal: Fluid and Electrolyte Balance  Outcome: Ongoing, Progressing     Problem: Oral Intake Inadequate (Acute Kidney Injury/Impairment)  Goal: Optimal Nutrition Intake  Outcome: Ongoing, Progressing     Problem: Renal Function Impairment (Acute Kidney Injury/Impairment)  Goal: Effective Renal Function  Outcome: Ongoing, Progressing

## 2022-11-12 NOTE — SUBJECTIVE & OBJECTIVE
Interval History:  Remains intubated.    Review of Systems   Reason unable to perform ROS: Intubated sedated.  Afebrile.  Objective:     Vital Signs (Most Recent):  Temp: 96.5 °F (35.8 °C) (11/12/22 0730)  Pulse: 65 (11/12/22 0738)  Resp: (!) 22 (11/12/22 0738)  BP: 118/68 (11/12/22 0630)  SpO2: 98 % (11/12/22 0645)   Vital Signs (24h Range):  Temp:  [96.5 °F (35.8 °C)-98.5 °F (36.9 °C)] 96.5 °F (35.8 °C)  Pulse:  [] 65  Resp:  [18-42] 22  SpO2:  [91 %-99 %] 98 %  BP: ()/(50-75) 118/68     Weight: 94.5 kg (208 lb 5.4 oz)  Body mass index is 29.89 kg/m².     SpO2: 98 %  O2 Device (Oxygen Therapy): ventilator      Intake/Output Summary (Last 24 hours) at 11/12/2022 0834  Last data filed at 11/12/2022 0700  Gross per 24 hour   Intake 1133.54 ml   Output 297 ml   Net 836.54 ml       Lines/Drains/Airways       Drain  Duration                  NG/OG Tube 11/09/22 0805 orogastric 16 Fr. Right mouth 3 days         Urethral Catheter 11/09/22 0815 Non-latex 14 Fr. 3 days              Airway  Duration                  Airway - Non-Surgical 11/09/22 0804 Endotracheal Tube 3 days              Peripheral Intravenous Line  Duration                  Peripheral IV - Single Lumen 11/08/22 2050 20 G Distal;Left;Posterior Forearm 3 days         Peripheral IV - Single Lumen 11/09/22 0500 20 G Posterior;Right Forearm 3 days         Peripheral IV - Single Lumen 11/09/22 0900 18 G Anterior;Distal;Left Upper Arm 2 days         Peripheral IV - Single Lumen 11/10/22 0044 18 G Right Forearm 2 days                    Physical Exam  Constitutional:       Appearance: Normal appearance.   HENT:      Head: Normocephalic and atraumatic.      Nose: Nose normal.      Mouth/Throat:      Mouth: Mucous membranes are moist.      Pharynx: Oropharynx is clear.   Eyes:      Pupils: Pupils are equal, round, and reactive to light.   Cardiovascular:      Rate and Rhythm: Normal rate and regular rhythm.      Pulses:           Carotid pulses are 2+ on  the right side and 2+ on the left side.       Radial pulses are 2+ on the right side and 2+ on the left side.   Pulmonary:      Effort: Pulmonary effort is normal.      Breath sounds: Normal breath sounds.   Abdominal:      General: Abdomen is flat. Bowel sounds are normal.      Palpations: Abdomen is soft.   Musculoskeletal:      Cervical back: Normal range of motion and neck supple.   Skin:     General: Skin is warm and dry.      Comments: Small left arm pustule noted       Significant Labs: Blood Culture:   Recent Labs   Lab 11/10/22  1627   LABBLOO No Growth to date  No Growth to date  Gram stain aer bottle: Gram positive cocci in clusters resembling Staph  Results called to and read back by:Radha Yañez RN 11/11/2022  17:55   , BMP:   Recent Labs   Lab 11/11/22  0531 11/11/22  1248 11/11/22  1811 11/11/22  2352 11/12/22  0621   *   < > 279* 249* 208*   *   < > 130* 129* 128*   K 4.5   < > 3.9 4.1 4.9   CL 95   < > 94* 94* 95   CO2 19*   < > 18* 19* 19*   BUN 87*   < > 97* 100* 107*   CREATININE 4.9*   < > 5.6* 6.1* 6.4*   CALCIUM 8.3*   < > 8.1* 7.9* 7.8*   MG 3.0*  --   --   --   --     < > = values in this interval not displayed.   , CBC   Recent Labs   Lab 11/11/22  0531 11/12/22  0621   WBC 14.43* 14.58*   HGB 14.3 13.4*   HCT 41.0 37.9*   PLT 90* 113*   , and All pertinent lab results from the last 24 hours have been reviewed.    Significant Imaging:

## 2022-11-12 NOTE — NURSING
Pt intubated and lightly sedated. Continues on Propofol, and Amio. MAPs dropped to low 60s; Levophed restarted briefly during the night (approx. 45 mins). Pt now in NSR this AM. HR 60s. UOP remains minimal ~ 180ccs overnight. Team is aware of low urine output. BUN/Creat trending up this AM. Dr. Price (Cardiology) at bedside this AM. Reviewed plan of care. Report given to oncoming RNNi.

## 2022-11-12 NOTE — PROGRESS NOTES
Progress Note  Nephrology      Consult Requested By: Jermain Jackson, *      SUBJECTIVE:     Overnight events  Patient is a 67 y.o. male     Patient Active Problem List   Diagnosis    Major depressive disorder with current active episode    Anxiety    Bell's palsy    Insomnia    BPPV (benign paroxysmal positional vertigo)    Chronic vertigo    Benign essential HTN    Moderate episode of recurrent major depressive disorder    Acute hypoxemic respiratory failure    DKA (diabetic ketoacidosis)    COPD with acute exacerbation    Centrilobular emphysema    Bacterial pneumonia    Endotracheally intubated    Septic shock    Shock, unspecified    Atrial fibrillation with RVR    Bacteremia due to group B Streptococcus    Acute renal failure with acute tubular necrosis superimposed on stage 3a chronic kidney disease    Chest pain of uncertain etiology    Acute bacterial endocarditis    NADIYA (acute kidney injury)     Past Medical History:   Diagnosis Date    Anxiety 8/5/2016    Bell's palsy     Benign essential HTN 4/27/2018    Lacunar infarction     Remote finding seen on cranial imaging during acute workup for Bell's palsy/[place] [facility]    Pneumonia     age 5    Psoriasis               OBJECTIVE:     Vitals:    11/12/22 0645 11/12/22 0730 11/12/22 0738 11/12/22 0918   BP:    (!) 116/59   Pulse: 65  65 69   Resp: 18  (!) 22    Temp:  96.5 °F (35.8 °C)     TempSrc:  Axillary     SpO2: 98%  (!) 94%    Weight:       Height:           Temp: 96.5 °F (35.8 °C) (11/12/22 0730)  Pulse: 69 (11/12/22 0918)  Resp: (!) 22 (11/12/22 0738)  BP: (!) 116/59 (11/12/22 0918)  SpO2: (!) 94 % (11/12/22 0738)    Date 11/12/22 0700 - 11/13/22 0659   Shift 2317-3885 5546-5635 2387-4052 24 Hour Total   INTAKE   I.V.(mL/kg) 143.4(1.5)   143.4(1.5)   IV Piggyback 50   50   Shift Total(mL/kg) 193.4(2)   193.4(2)   OUTPUT   Shift Total(mL/kg)       Weight (kg) 94.5 94.5 94.5 94.5             Medications:   albuterol-ipratropium  3 mL Nebulization  Q6H WAKE    aspirin  81 mg Oral Daily    atorvastatin  40 mg Oral QHS    ceFAZolin (ANCEF) IVPB  2 g Intravenous Q12H    enoxaparin  30 mg Subcutaneous Daily    insulin aspart U-100  10 Units Subcutaneous QID    insulin detemir U-100  30 Units Subcutaneous BID    methylPREDNISolone sodium succinate injection  60 mg Intravenous Q8H    metoprolol tartrate  25 mg Oral BID    mupirocin   Nasal BID    oseltamivir  30 mg Per OG tube Daily    pantoprazole  40 mg Intravenous Daily    polyethylene glycol  17 g Per NG tube Daily    senna-docusate 8.6-50 mg  1 tablet Oral BID    sodium chloride 0.9%  500 mL Intravenous Once      amiodarone in dextrose 5% 0.5 mg/min (11/12/22 0700)    NORepinephrine bitartrate-D5W Stopped (11/12/22 0054)    propofoL 35 mcg/kg/min (11/12/22 0742)     Physical Exam:  On propofol  Lungs: diminished breath sounds  Intubated   On 50 % O2  Heart: pulse 69  Abdomen: soft  Extremities: edema  Skin: dry  Laboratory:  ABG  Labs reviewed  Recent Results (from the past 336 hour(s))   Basic metabolic panel    Collection Time: 11/12/22  6:21 AM   Result Value Ref Range    Sodium 128 (L) 136 - 145 mmol/L    Potassium 4.9 3.5 - 5.1 mmol/L    Chloride 95 95 - 110 mmol/L    CO2 19 (L) 23 - 29 mmol/L     (H) 8 - 23 mg/dL    Creatinine 6.4 (H) 0.5 - 1.4 mg/dL    Calcium 7.8 (L) 8.7 - 10.5 mg/dL    Anion Gap 14 8 - 16 mmol/L   Basic metabolic panel    Collection Time: 11/11/22 11:52 PM   Result Value Ref Range    Sodium 129 (L) 136 - 145 mmol/L    Potassium 4.1 3.5 - 5.1 mmol/L    Chloride 94 (L) 95 - 110 mmol/L    CO2 19 (L) 23 - 29 mmol/L     (H) 8 - 23 mg/dL    Creatinine 6.1 (H) 0.5 - 1.4 mg/dL    Calcium 7.9 (L) 8.7 - 10.5 mg/dL    Anion Gap 16 8 - 16 mmol/L   Basic metabolic panel    Collection Time: 11/11/22  6:11 PM   Result Value Ref Range    Sodium 130 (L) 136 - 145 mmol/L    Potassium 3.9 3.5 - 5.1 mmol/L    Chloride 94 (L) 95 - 110 mmol/L    CO2 18 (L) 23 - 29 mmol/L    BUN 97 (H) 8 - 23  mg/dL    Creatinine 5.6 (H) 0.5 - 1.4 mg/dL    Calcium 8.1 (L) 8.7 - 10.5 mg/dL    Anion Gap 18 (H) 8 - 16 mmol/L     Recent Results (from the past 336 hour(s))   CBC Auto Differential    Collection Time: 11/12/22  6:21 AM   Result Value Ref Range    WBC 14.58 (H) 3.90 - 12.70 K/uL    Hemoglobin 13.4 (L) 14.0 - 18.0 g/dL    Hematocrit 37.9 (L) 40.0 - 54.0 %    Platelets 113 (L) 150 - 450 K/uL   CBC Auto Differential    Collection Time: 11/11/22  5:31 AM   Result Value Ref Range    WBC 14.43 (H) 3.90 - 12.70 K/uL    Hemoglobin 14.3 14.0 - 18.0 g/dL    Hematocrit 41.0 40.0 - 54.0 %    Platelets 90 (L) 150 - 450 K/uL   CBC with Automated Differential    Collection Time: 11/10/22  4:26 AM   Result Value Ref Range    WBC 10.38 3.90 - 12.70 K/uL    Hemoglobin 14.1 14.0 - 18.0 g/dL    Hematocrit 42.7 40.0 - 54.0 %    Platelets 58 (L) 150 - 450 K/uL     Urinalysis  No results for input(s): COLORU, CLARITYU, SPECGRAV, PHUR, PROTEINUA, GLUCOSEU, BILIRUBINCON, BLOODU, WBCU, RBCU, BACTERIA, MUCUS, NITRITE, LEUKOCYTESUR, UROBILINOGEN, HYALINECASTS in the last 24 hours.    Diagnostic Results:  X-Ray: Reviewed  US: Reviewed  Echo: Reviewed  ACCESS    ASSESSMENT/PLAN:   Acute Bacterial Endocarditis  Blood culture 11/10 - STAPHYLOCOCCUS AUREUS   On cefazolin  Blood pressure 116/59  Atrial fibrillation with RVR  On amiodarone   Norepinephrine off  Echo-   The left ventricle is normal in size with normal systolic function.  The estimated ejection fraction is 55%.  Mild mitral regurgitation.  Normal right ventricular size with normal right ventricular systolic function.  DKA on admission  NADIYA/ CKD  3   cc  Fluid balance positive 6,156 cc  UA protein 2+, blood 2+, glucose 4+, katie 1+  UA sodium < 20, osmolality 373  Creatinine 6.4  GFR 9 cc/min  Azotemia   Hypercatabolic   On steroids    Metabolic bone disease  Hyponatremia   Na 128  Metabolic acidosis   Anemia multifactorial  Hb 13.4  Poor nutrition  Albumin 2  Tube  feeding  CXR-  Small pleural effusions  On 50 % O2   Weight daily  I and O  Avoid hypotension,  nephrotoxic agents.  Discussed with the family about dialysis.   Explained the risks of hypotension, bleeding, arrhythmia, and death during dialysis.

## 2022-11-12 NOTE — ASSESSMENT & PLAN NOTE
New onset during this admission.  He is now in sinus rhythm.  Amiodarone conversion carried out.  Currently he is not on full anticoagulation.  There were no intracardiac thrombi on transesophageal ECHO.  -I will start NG/p.o. amiodarone today.  Clearly he has high recurrence risk.

## 2022-11-13 NOTE — PROGRESS NOTES
[facility] Medicine  Progress Note    Patient Name: Jose Luis Rendon  MRN: [id number]  Patient Class: IP- Inpatient   Admission Date: 11/7/2022  Length of Stay: 6 days  Attending Physician: Jermain Jackson, *  Primary Care Provider: Deric Armstrong MD        Subjective:     Principal Problem:MSSA bacteremia        HPI:  67M with PMH of COPD, HTN, CVA, Bell's Palsy presents with c/o sob, chest pain, nausea, vomiting x 4 days. Pt first noticed chest pain after picking up a family member off the floor. He also has had multiple episodes of N/V with retching which has exacerbated symptoms. The pain radiates around right side to his back. Also has SOB which pt states is chronic but has progressively worsened recently. Denies fever, chills, palpitations, diaphoresis, abd pain, diarrhea, dysuria. No recent fall/trauma. Workup in ED remarkable for hyperglycemia, HAGMA, elevated beta hydroxy. CXR showing Subtle bibasilar interstitial opacities, no focal consolidation. No known history of CHF or DM. Patient will be admitted to hospital medicine service for further management.      Overview/Hospital Course:  No notes on file    Interval History:  Not requiring pressors.  Tolerated hemodialysis yesterday without issues.  Still with minimal urine output.  Continuing on IV cefazolin.    Review of Systems   Unable to perform ROS: Intubated   Objective:     Vital Signs (Most Recent):  Temp: 97.8 °F (36.6 °C) (11/13/22 1200)  Pulse: 70 (11/13/22 1312)  Resp: (!) 25 (11/13/22 1312)  BP: 125/60 (11/13/22 1300)  SpO2: (!) 92 % (11/13/22 1312)   Vital Signs (24h Range):  Temp:  [97 °F (36.1 °C)-97.8 °F (36.6 °C)] 97.8 °F (36.6 °C)  Pulse:  [62-75] 70  Resp:  [17-32] 25  SpO2:  [90 %-98 %] 92 %  BP: ()/(50-70) 125/60     Weight: 94.5 kg (208 lb 5.4 oz)  Body mass index is 29.89 kg/m².    Intake/Output Summary (Last 24 hours) at 11/13/2022 1333  Last data filed at 11/13/2022 1300  Gross per 24 hour   Intake  2188.34 ml   Output 575 ml   Net 1613.34 ml        Physical Exam  Constitutional:       Appearance: He is ill-appearing. He is not toxic-appearing.      Interventions: He is sedated and intubated.   HENT:      Head: Normocephalic and atraumatic.      Mouth/Throat:      Mouth: Mucous membranes are moist.   Eyes:      Conjunctiva/sclera: Conjunctivae normal.      Pupils: Pupils are equal, round, and reactive to light.   Cardiovascular:      Rate and Rhythm: Normal rate and regular rhythm.      Heart sounds: Normal heart sounds.   Pulmonary:      Effort: Pulmonary effort is normal. He is intubated.      Breath sounds: Rales (bibasilar) present.   Abdominal:      General: There is no distension.      Palpations: Abdomen is soft.      Tenderness: There is no abdominal tenderness. There is no guarding.   Musculoskeletal:         General: Normal range of motion.      Cervical back: Normal range of motion and neck supple.      Right lower leg: Edema present.      Left lower leg: Edema present.   Skin:     General: Skin is warm and dry.   Neurological:      General: No focal deficit present.      Sensory: No sensory deficit.      Comments: Facial droop   Psychiatric:      Comments: Unable to assess       Significant Labs: All pertinent labs within the past 24 hours have been reviewed.    Significant Imaging: I have reviewed all pertinent imaging results/findings within the past 24 hours.      Assessment/Plan:      * MSSA bacteremia  - blood cultures growing staph (appears to be MSSA based off PCR) and strep  - repeat blood cultures for clearance  - admission TTE without vegetation; SANTA with vegetation  - on BSABx; de-escalated to cefazolin  - ID consult; will need 6 weeks IV antibiotics from date of culture clearance      Acute bacterial endocarditis  - SANTA with significant vegetations  - continue IV antibiotics  - serial cultures for clearance  - anticipate 6 weeks of IV antibiotics from date of culture clearance with  cefazolin  - ID following      Acute renal failure with acute tubular necrosis superimposed on stage 3a chronic kidney disease  Patient with acute kidney injury likely due to acute tubular necrosis NADIYA is currently worsening. Labs reviewed- Renal function/electrolytes with Estimated Creatinine Clearance: 14.1 mL/min (A) (based on SCr of 5.7 mg/dL (H)). according to latest data. Monitor urine output and serial BMP and adjust therapy as needed. Avoid nephrotoxins and renally dose meds for GFR listed above.   - likely due to septic shock/DKA  - BP support with pressors as needed  - IV lasix trial without improvement  - Nephrology consultation:  Initiated dialysis 11/12, well tolerated  - trial IV Lasix again today    Atrial fibrillation with RVR  Patient with Paroxysmal (<7 days) atrial fibrillation which is controlled currently with Amiodarone. Patient is currently in sinus rhythm.[id number] Score: 2.  Anticoagulation indicated. Anticoagulation done with lovenox.  -transitioning amiodarone to p.o.    Septic shock  - likely due to post-viral PNA with staph bacteremia and endocarditis  - likely distributive component due to sedation  - wean off levophed  - continue IV abx  - repeat BCx for clearance    Endotracheally intubated  - worsening respiratory failure  - intubated 11/9  - Pulm following      Bacterial pneumonia  - suspect due to post-viral PNA with staph  - COVID and flu negative on admission  - on BSABx for now given acute decompensation  - empiric tamiflu stopped   - staph bacteremia, switched antibiotics to cefazolin    Centrilobular emphysema  -noted on imaging  -needs PFTs as outpatient and pulmonology follow-up      COPD with acute exacerbation  As above-nightly BiPAP for in addition to prednisone, azithromycin  -decompensation prompting intubation  -stop steroids; on broad-spectrum antibiotics  -suspect may have staph pneumonia    DKA (diabetic ketoacidosis)  Workup in ED remarkable for hyperglycemia,  HAGMA, elevated beta hydroxy.   New diagnosis of DM  DKA protocol  -A1c greater than 14; will need insulin at time of discharge  -reopened gap; transitioned back to insulin gtt  - now with concern for hypoglycemia; possibly due to insulin stacking, will hold off on dose today    Acute hypoxemic respiratory failure  Likely 2/2 COPD  CXR showing subtle bibasilar interstitial opacities.  No focal consolidation  CT chest 10/2021: Centrilobular emphysema with an upper lobe predominance.  Bibasilar crackles on exam  Standing duonebs  Started on azithromycin and prednisone; fevers overnight, broadened antibiotics and repeating flu/RSV/COVID  -now with bacteremia  Supp O2 PRN to keep O2 sat 88-92%  TTE  Strict I&O  Hold IV fluids    Benign essential HTN  - hold amlodipine      VTE Risk Mitigation (From admission, onward)         Ordered     heparin (porcine) injection 2,400 Units  As needed (PRN)         11/12/22 1554     enoxaparin injection 30 mg  Daily         11/10/22 1155     IP VTE LOW RISK PATIENT  Once         11/07/22 1231     Place sequential compression device  Until discontinued         11/07/22 1231                Discharge Planning   BELINDA:      Code Status: Full Code   Is the patient medically ready for discharge?:     Reason for patient still in hospital (select all that apply): Patient unstable and Treatment  Discharge Plan A: Long-term acute care facility (LTAC)   Discharge Delays: None known at this time        Critical care time spent on the evaluation and treatment of severe organ dysfunction, review of pertinent labs and imaging studies, discussions with consulting providers and discussions with patient/family: 35 minutes.      Jermain Jackson MD  Department of Hospital Medicine   [place] - Intensive Care

## 2022-11-13 NOTE — PROGRESS NOTES
11/13/22 1716   During Hemodialysis Assessment   Intra-Hemodialysis Comments tx complete   Post-Hemodialysis Assessment   Rinseback Volume (mL) 250 mL   Blood Volume Processed (Liters) 5 L   Dialyzer Clearance Lightly streaked   Duration of Treatment 20 minutes   Additional Fluid Intake (mL) 500 mL   Total UF (mL) -235 mL   Net Fluid Removal 0   Patient Response to Treatment Pt's accessed constant alarm despite drop in BFR, position, and line swap   Post-Hemodialysis Comments Pt's CVC not functional, though it was flushed and Heparin placed, CVC was capped and wrapped

## 2022-11-13 NOTE — PLAN OF CARE
Care Plan    POC reviewed with Jose Luis Rendon and family. Questions and concerns addressed. No acute events today. Pt progressing toward goals. Will continue to monitor. See below and flowsheets for full assessment and VS info.       Neuro:  [place] Coma Scale  Best Eye Response: 3-->(E3) to speech  Best Motor Response: 4-->(M4) withdraws from pain  Best Verbal Response: 1-->(V1) none  [place] Coma Scale Score: 8  Assessment Qualifiers: patient intubated, patient chemically sedated or paralyzed  Pupil PERRLA: no  24 hr Temp:  [96.5 °F (35.8 °C)-98.5 °F (36.9 °C)]      CV:  Rhythm: normal sinus rhythm  DVT prophylaxis: VTE Required Core Measure: (SCDs) Sequential compression device initiated/maintained, Pharmacological prophylaxis initiated/maintained    Resp:  O2 Device (Oxygen Therapy): ventilator  Vent Mode: A/CMV-VC  Set Rate: 18 BPM  Oxygen Concentration (%): 40  Vt Set: 470 mL  PEEP/CPAP: 6.9 cmH20    GI/:  GI prophylaxis: yes  Diet/Nutrition Received: tube feeding  Last Bowel Movement: 11/12/22  Voiding Characteristics: ureteral catheter       Urethral Catheter 11/09/22 0815 Non-latex 14 Fr.-Reason for Continuing Urinary Catheterization: Critically ill in ICU and requiring hourly monitoring of intake/output   Intake/Output Summary (Last 24 hours) at 11/12/2022 1845  Last data filed at 11/12/2022 1835  Gross per 24 hour   Intake 1317.96 ml   Output 745 ml   Net 572.96 ml       Labs/Accuchecks:  Recent Labs   Lab 11/12/22  0621   WBC 14.58*   RBC 4.32*   HGB 13.4*   HCT 37.9*   *      Recent Labs   Lab 11/09/22 2007 11/09/22  2352 11/12/22  1756   *   < > 133*   K 4.0   < > 3.5   CO2 18*   < > 17*   CL 96   < > 98   BUN 62*   < > 54*   CREATININE 3.0*   < > 3.6*   ALKPHOS 131  --   --    ALT 58*  --   --    AST 59*  --   --    BILITOT 1.2*  --   --     < > = values in this interval not displayed.    No results for input(s): PROTIME, INR, APTT, HEPANTIXA in the last 168 hours.   Recent Labs    Lab 11/07/22  1652   TROPONINI 0.010       Electrolytes: Electrolytes replaced  Accuchecks: Q4H    Gtts/LDAs:   amiodarone in dextrose 5% 0.5 mg/min (11/12/22 1333)    NORepinephrine bitartrate-D5W Stopped (11/12/22 0054)    propofoL 35 mcg/kg/min (11/12/22 1400)       Lines/Drains/Airways       Central Venous Catheter Line  Duration             Trialysis (Dialysis) Catheter 11/12/22 1324 right internal jugular <1 day              Drain  Duration                  NG/OG Tube 11/09/22 0805 orogastric 16 Fr. Right mouth 3 days         Urethral Catheter 11/09/22 0815 Non-latex 14 Fr. 3 days              Airway  Duration                  Airway - Non-Surgical 11/09/22 0804 Endotracheal Tube 3 days              Peripheral Intravenous Line  Duration                  Peripheral IV - Single Lumen 11/08/22 2050 20 G Distal;Left;Posterior Forearm 3 days         Peripheral IV - Single Lumen 11/09/22 0500 20 G Posterior;Right Forearm 3 days         Peripheral IV - Single Lumen 11/09/22 0900 18 G Anterior;Distal;Left Upper Arm 3 days         Peripheral IV - Single Lumen 11/10/22 0044 18 G Right Forearm 2 days                    Skin/Wounds  Bathing/Skin Care: other (see comments) (hands and face washes) (11/12/22 0713)  Wounds: No  Wound care consulted: No    Consults  Consults (From admission, onward)          Status Ordering Provider     Inpatient consult to [facility] (Nolberto Walters, Rinku)  Once        Provider:  (Not yet assigned)    Completed SANDRO HUTSON     Inpatient consult to [facility]iology-[facility]  Once        Provider:  (Not yet assigned)    SANDRO De Jesus     Inpatient consult to Infectious Diseases  Once        Provider:  (Not yet assigned)    Completed SANDRO HUTSON     Inpatient consult to Registered Dietitian/Nutritionist  Once        Provider:  (Not yet assigned)    ORLIN Rudd

## 2022-11-13 NOTE — ASSESSMENT & PLAN NOTE
Patient with acute kidney injury likely due to acute tubular necrosis NADIYA is currently worsening. Labs reviewed- Renal function/electrolytes with Estimated Creatinine Clearance: 14.1 mL/min (A) (based on SCr of 5.7 mg/dL (H)). according to latest data. Monitor urine output and serial BMP and adjust therapy as needed. Avoid nephrotoxins and renally dose meds for GFR listed above.   - likely due to septic shock/DKA  - BP support with pressors as needed  - IV lasix trial without improvement  - Nephrology consultation:  Initiated dialysis 11/12, well tolerated  - trial IV Lasix again today

## 2022-11-13 NOTE — PROGRESS NOTES
[facility] - Intensive Care  Critical Care Medicine  Progress Note    Patient Name: Jose Luis Rendon  MRN: [id number]  Admission Date: 11/7/2022  Hospital Length of Stay: 6 days  Code Status: Full Code  Attending Provider: Jermain Jackson, *  Primary Care Provider: Deric Armtsrong MD   Principal Problem: Bacteremia due to group B Streptococcus    Subjective:     HPI:  67 year old male with history of HTN, lacunar infarct, anxiety and Bell's Palsy that presented to the ED for the evaluation fo 3 days worsening exertional SOB, 1 pillow orthopnea and left sided shoulder pain that radiates to back and chest pain. Patient discloses lower extremities edema but no cough or fever. Patient discloses no history of heart disease or lung disease.   Patient has significant past history of smoking (45 year pack history; stopped in 2015).  On presentation, patient was found to have mild DKA and started on IVF and insulin drip.   MICU was called for desaturation. Examination remarkable for bi-basilar crackles and lower extremities pitting edema.   CXR showed increased interstitial markings  Troponin is negative. EKG showed RBBB (old) and T wave abnormality in the inferior leads (new)  Review of old imagings, CT scan of 10/28/2021 showed centrilobular emphysema with an upper lobe predominance and multiple bilateral solid pulmonary nodules.  Bed side Cardiac and lung US showed bilateral b lines (right > left) with hyper-dynamic heart  IVF was discontinued and one dose of IV lasix ordered    Blood culture positive for staphylococcus aureus. SANTA of 11/11 showed AV with mobile vegetation/mass but no AR, tri leaflet valve without any obvious anatomic abnormalities.      Interval History/Significant Events:  No acute overnight event.   Patient is off pressor.  Dialyzed yesterday with removal of 500cc of fluid    Review of Systems Unable to assess  Objective:     Vital Signs (Most Recent):  Temp: 97.7 °F (36.5 °C) (11/13/22 1526)  Pulse: 75  (11/13/22 1600)  Resp: 20 (11/13/22 1600)  BP: 130/69 (11/13/22 1600)  SpO2: (!) 93 % (11/13/22 1600)   Vital Signs (24h Range):  Temp:  [97 °F (36.1 °C)-97.8 °F (36.6 °C)] 97.7 °F (36.5 °C)  Pulse:  [62-75] 75  Resp:  [17-32] 20  SpO2:  [90 %-98 %] 93 %  BP: ()/(50-70) 130/69   Weight: 94.5 kg (208 lb 5.4 oz)  Body mass index is 29.89 kg/m².      Intake/Output Summary (Last 24 hours) at 11/13/2022 1726  Last data filed at 11/13/2022 1500  Gross per 24 hour   Intake 2168.34 ml   Output 560 ml   Net 1608.34 ml       Physical Exam  Constitutional:       Appearance: He is ill-appearing. He is not toxic-appearing.      Interventions: He is sedated and intubated.   HENT:      Head: Normocephalic and atraumatic.      Mouth/Throat:      Mouth: Mucous membranes are moist.   Eyes:      Conjunctiva/sclera: Conjunctivae normal.      Pupils: Pupils are equal, round, and reactive to light.   Cardiovascular:      Rate and Rhythm: Normal rate and regular rhythm.      Heart sounds: Normal heart sounds.   Pulmonary:      Effort: Pulmonary effort is normal. He is intubated.      Breath sounds: Rales (bibasilar) present.   Abdominal:      General: There is no distension.      Palpations: Abdomen is soft.      Tenderness: There is no abdominal tenderness. There is no guarding.   Musculoskeletal:         General: Normal range of motion.      Cervical back: Normal range of motion and neck supple.      Right lower leg: Edema present.      Left lower leg: Edema present.   Skin:     General: Skin is warm and dry.   Neurological:      General: No focal deficit present.      Sensory: No sensory deficit.      Comments: Facial droop   Psychiatric:      Comments: Unable to assess     Vents:  Vent Mode: A/CMV-PC (11/13/22 1526)  Set Rate: 16 BPM (11/13/22 1526)  Vt Set: 470 mL (11/13/22 0929)  PEEP/CPAP: 7.8 cmH20 (11/13/22 1526)  Oxygen Concentration (%): 40 (11/13/22 1600)  Peak Airway Pressure: 24.5 cmH2O (11/13/22 1526)  Plateau  Pressure: 14 cmH20 (11/13/22 1526)  Total Ve: 12.3 L/m (11/13/22 1526)  F/VT Ratio<105 (RSBI): (!) 38.18 (11/13/22 1526)  Lines/Drains/Airways       Central Venous Catheter Line  Duration             Trialysis (Dialysis) Catheter 11/12/22 1324 right internal jugular 1 day              Drain  Duration                  NG/OG Tube 11/09/22 0805 orogastric 16 Fr. Right mouth 4 days         Urethral Catheter 11/09/22 0815 Non-latex 14 Fr. 4 days              Airway  Duration                  Airway - Non-Surgical 11/09/22 0804 Endotracheal Tube 4 days              Peripheral Intravenous Line  Duration                  Peripheral IV - Single Lumen 11/08/22 2050 20 G Distal;Left;Posterior Forearm 4 days         Peripheral IV - Single Lumen 11/09/22 0500 20 G Posterior;Right Forearm 4 days         Peripheral IV - Single Lumen 11/09/22 0900 18 G Anterior;Distal;Left Upper Arm 4 days         Peripheral IV - Single Lumen 11/10/22 0044 18 G Right Forearm 3 days                  Significant Labs:    CBC/Anemia Profile:  Recent Labs   Lab 11/12/22  0621 11/13/22  0448   WBC 14.58* 15.07*   HGB 13.4* 12.6*   HCT 37.9* 35.2*   * 131*   MCV 88 87   RDW 13.4 13.4        Chemistries:  Recent Labs   Lab 11/13/22  0028 11/13/22  0448 11/13/22  1218   * 130* 133*   K 4.4 4.6 4.3   CL 96 97 96   CO2 15* 21* 15*   BUN 70* 75* 80*   CREATININE 4.9* 5.4* 5.7*   CALCIUM 7.6* 7.5* 7.8*       All pertinent labs within the past 24 hours have been reviewed.    Significant Imaging:  I have reviewed all pertinent imaging results/findings within the past 24 hours.  I have reviewed and interpreted all pertinent imaging results/findings within the past 24 hours.      ABG  Recent Labs   Lab 11/10/22  0543   PH 7.326*   PO2 70*   PCO2 43.9   HCO3 23.0*   BE -3     Assessment/Plan:     Pulmonary  Acute hypoxemic respiratory failure  Acute hypoxemic respiratory failure due to COPD exacerbation and pneumonia     ECHO showed mild concentric  LVH, EF 55%, Mild right ventricular enlargement with normal right ventricular systolic function, PASP 29 and CVP of 3     Recommendations:  Continue ventilatory support   Continue DuoNebs  Status post IV steroid  Continue culture directed antibiotics        Cardiac/Vascular  Acute bacterial staphy aureus endocarditis  Repeat blood culture - NG  Continue cefazolin     Atrial fibrillation with RVR  Continue amiodarone  Metoprolol added        Renal/  NADIYA (acute kidney injury)  Likely due to ATN  Minimal urine output and creatinine rising  Dialysis initiated, plan for volume removal     Hypervolemic hyponatremia  Dialysis should help     ID  Septic shock, resolved  Continue antibiotic  Off pressor        Endocrine  * DKA (diabetic ketoacidosis)  Mild DKA, resolved  New diagnosis of Diabetes Mellitus to patient, of note, HBA1c last year was 7.3, now > 14     Glucose at goal  Continue insulin therapy     Thrombocytopenia  HIT score of 1 (low probability for HIT), Likely due to sepsis  B12 and folate acid level normal  Improving, continue to monitor             Critical Care Daily Checklist:     A: Awake: RASS Goal/Actual Goal: RASS Goal: -2-->light sedation  Actual: [place] Agitation Sedation Scale (RASS): Light sedation   B: Spontaneous Breathing Trial Performed?  Yes   C: SAT & SBT Coordinated?  Yes                     D: Delirium: CAM-ICU Overall CAM-ICU: Positive   E: Early Mobility Performed? No   F: Feeding Goal:TF    AS: Analgesia/Sedation On propofol   T: Thromboembolic Prophylaxis Enoxaparin   H: HOB > 300 {YES   U: Stress Ulcer Prophylaxis (if needed) PPI   G: Glucose Control Improving on insulin   B: Bowel Function Stool Occurrence: 1   I: Indwelling Catheter (Lines & Tran) Necessity Indicated   D: De-escalation of Antimicrobials/Pharmacotherapies Yes     Plan for the day/ETD dialysis     Code Status:  Family/Goals of Care: Full Code   Son updated at bed side               Critical secondary to Patient has  a condition that poses threat to life and bodily function: Severe Respiratory Distress      Critical care was time spent personally by me on the following activities: development of treatment plan with patient or surrogate and bedside caregivers, discussions with consultants, evaluation of patient's response to treatment, examination of patient, ordering and performing treatments and interventions, ordering and review of laboratory studies, ordering and review of radiographic studies, pulse oximetry, re-evaluation of patient's condition. This critical care time did not overlap with that of any other provider or involve time for any procedures.     Naty Cazares MD  Critical Care Medicine  [place] - Intensive Care

## 2022-11-13 NOTE — SUBJECTIVE & OBJECTIVE
Interval History:  Not requiring pressors.  Tolerated hemodialysis yesterday without issues.  Still with minimal urine output.  Continuing on IV cefazolin.    Review of Systems   Unable to perform ROS: Intubated   Objective:     Vital Signs (Most Recent):  Temp: 97.8 °F (36.6 °C) (11/13/22 1200)  Pulse: 70 (11/13/22 1312)  Resp: (!) 25 (11/13/22 1312)  BP: 125/60 (11/13/22 1300)  SpO2: (!) 92 % (11/13/22 1312)   Vital Signs (24h Range):  Temp:  [97 °F (36.1 °C)-97.8 °F (36.6 °C)] 97.8 °F (36.6 °C)  Pulse:  [62-75] 70  Resp:  [17-32] 25  SpO2:  [90 %-98 %] 92 %  BP: ()/(50-70) 125/60     Weight: 94.5 kg (208 lb 5.4 oz)  Body mass index is 29.89 kg/m².    Intake/Output Summary (Last 24 hours) at 11/13/2022 1333  Last data filed at 11/13/2022 1300  Gross per 24 hour   Intake 2188.34 ml   Output 575 ml   Net 1613.34 ml        Physical Exam  Constitutional:       Appearance: He is ill-appearing. He is not toxic-appearing.      Interventions: He is sedated and intubated.   HENT:      Head: Normocephalic and atraumatic.      Mouth/Throat:      Mouth: Mucous membranes are moist.   Eyes:      Conjunctiva/sclera: Conjunctivae normal.      Pupils: Pupils are equal, round, and reactive to light.   Cardiovascular:      Rate and Rhythm: Normal rate and regular rhythm.      Heart sounds: Normal heart sounds.   Pulmonary:      Effort: Pulmonary effort is normal. He is intubated.      Breath sounds: Rales (bibasilar) present.   Abdominal:      General: There is no distension.      Palpations: Abdomen is soft.      Tenderness: There is no abdominal tenderness. There is no guarding.   Musculoskeletal:         General: Normal range of motion.      Cervical back: Normal range of motion and neck supple.      Right lower leg: Edema present.      Left lower leg: Edema present.   Skin:     General: Skin is warm and dry.   Neurological:      General: No focal deficit present.      Sensory: No sensory deficit.      Comments: Facial  vidaop   Psychiatric:      Comments: Unable to assess       Significant Labs: All pertinent labs within the past 24 hours have been reviewed.    Significant Imaging: I have reviewed all pertinent imaging results/findings within the past 24 hours.

## 2022-11-13 NOTE — ASSESSMENT & PLAN NOTE
Patient with Paroxysmal (<7 days) atrial fibrillation which is controlled currently with Amiodarone. Patient is currently in sinus rhythm.[id number] Score: 2.  Anticoagulation indicated. Anticoagulation done with lovenox.  -transitioning amiodarone to p.o.

## 2022-11-13 NOTE — ASSESSMENT & PLAN NOTE
As above-nightly BiPAP for in addition to prednisone, azithromycin  -decompensation prompting intubation  -stop steroids; on broad-spectrum antibiotics  -suspect may have staph pneumonia

## 2022-11-13 NOTE — PROGRESS NOTES
[facility] Progress Note    Assessment/Recommendations:     66yo man w/a history of HTN, prior CVA, Bell's palsy, COPD, and tobacco use who was admitted on 2022 with 4 days of CP, SOB, N/V, and malaise and was found to have polymicrobial bacteremia (MSSA, GBS) due to native AV endocarditis with associated DKA from newly diagnosed diabetes (A1c>14%). His course has been c/b hypoxic RF (requiring intubation), septic shock (with brief pressor requirement), AF w/RVR, (s/p amio load), and NADIYA. ID has been consulted to help tailor antibiotics and assist in source evaluation. He has weaned from pressors and improved on tailored cefazolin while awaiting culture clearance (uncertain source but possible indolent skin vs pulmonary source of polymicrobial bacteremia given organisms identified).     - would continue cefazolin at 1g q24h for now (on lasix trial again, dosed CrCl <10)  - await pending repeat blood cx to assess for clearance  - will require 6wk course of antibiotics -- no PICC or midline permanent placement until culture clearance has been documented  - will assess for sites of metastatic seeding of MSSA infection following extubation when full history can be obtained     ID will follow.     Allyssa Eid MD  [facility] Attending  [phone]  Subjective:      No acute events. Afebrile. Dialyzed yesterday and back on lasix trial today.     Objective:   Last 24 Hour Vital Signs:  BP  Min: 86/53  Max: 147/69  Temp  Av.4 °F (36.3 °C)  Min: 97 °F (36.1 °C)  Max: 97.8 °F (36.6 °C)  Pulse  Av.6  Min: 62  Max: 75  Resp  Av.4  Min: 17  Max: 32  SpO2  Av.9 %  Min: 90 %  Max: 98 %  I/O last 3 completed shifts:  In: 2630 [I.V.:1492.8; Other:500; NG/GT:390; IV Piggyback:247.3]  Out: 785 [Urine:285; Other:500]    Physical Exam  Constitutional:       Appearance: He is ill-appearing.      Comments: Sedated on ventilator   HENT:      Head: Atraumatic.      Right Ear: External ear normal.       Left Ear: External ear normal.      Mouth/Throat:      Comments: ET tube in place  Cardiovascular:      Rate and Rhythm: Normal rate and regular rhythm.      Heart sounds: Normal heart sounds.   Pulmonary:      Comments: Air movement bilateral lungs on auscultation  Abdominal:      General: There is no distension.      Palpations: Abdomen is soft.   Musculoskeletal:         General: No deformity.   Skin:     General: Skin is warm.   Neurological:      Comments: Medically sedated. Unable to perform full neurological assessment      Vent Mode: A/CMV-PC  Oxygen Concentration (%):  [] 40  Resp Rate Total:  [19 br/min-30 br/min] 28 br/min  Vt Set:  [470 mL] 470 mL  PEEP/CPAP:  [6.7 cmH20-7.4 cmH20] 7 cmH20  Mean Airway Pressure:  [9.4 bpM58-59.3 cmH20] 10.9 cmH20      Laboratory:  Laboratory Data Reviewed: yes  Pertinent Findings:  Recent Labs   Lab 11/07/22  1118 11/07/22  1232 11/09/22 2007 11/09/22  2352 11/11/22  0531 11/11/22  1248 11/12/22  0621 11/12/22  1226 11/13/22  0028 11/13/22  0448 11/13/22  1218   WBC  --    < >  --    < > 14.43*  --  14.58*  --   --  15.07*  --    HGB  --    < >  --    < > 14.3  --  13.4*  --   --  12.6*  --    HCT  --    < >  --    < > 41.0  --  37.9*  --   --  35.2*  --    PLT  --    < >  --    < > 90*  --  113*  --   --  131*  --    MCV  --    < >  --    < > 89  --  88  --   --  87  --    RDW  --    < >  --    < > 13.3  --  13.4  --   --  13.4  --    *   < > 131*   < > 130*   < > 128*   < > 129* 130* 133*   K 4.3   < > 4.0   < > 4.5   < > 4.9   < > 4.4 4.6 4.3   CL 97   < > 96   < > 95   < > 95   < > 96 97 96   CO2 12*   < > 18*   < > 19*   < > 19*   < > 15* 21* 15*   BUN 28*   < > 62*   < > 87*   < > 107*   < > 70* 75* 80*   CREATININE 1.4   < > 3.0*   < > 4.9*   < > 6.4*   < > 4.9* 5.4* 5.7*   *   < > 545*  545*   < > 290*   < > 208*   < > 204* 126* 64*   PROT 7.4  --  5.9*  --   --   --   --   --   --   --   --    ALBUMIN 3.2*  --  2.0*  --   --   --   --   --    --   --   --    BILITOT 1.6*  --  1.2*  --   --   --   --   --   --   --   --    AST 27  --  59*  --   --   --   --   --   --   --   --    ALKPHOS 97  --  131  --   --   --   --   --   --   --   --    ALT 47*  --  58*  --   --   --   --   --   --   --   --     < > = values in this interval not displayed.           Microbiology Data:  11/9 blood cx: MSSA/GBS in first sets; MSSA only in later sets  11/10 blood cx: MSSA  11/10 sputum cx MSSA  11/11 blood cx NGTD        Antimicrobials:  Cefazolin    Other Results:  Radiology Results:  X-Ray Chest 1 View    Result Date: 11/9/2022  EXAMINATION: XR CHEST 1 VIEW CLINICAL HISTORY: ETT placement; TECHNIQUE: Single frontal view of the chest was performed. COMPARISON: 11/08/2022 FINDINGS: Endotracheal tube tip projects approximately 5 cm above the mamie.  Enteric tube courses through the left upper quadrant however extends past field of view. Mediastinal structures are midline.  Stable cardiomediastinal silhouette.  Aortic calcification. Stable diffuse bilateral interstitial attenuation which could represent pulmonary edema or infection.  No new focal airspace opacity.  Slightly increased obscuration of the left hemidiaphragm and costophrenic angle suggestive for slightly increased volume left pleural effusion and atelectasis.  No pneumothorax.     As above. Electronically signed by: Sathish Candelaria Date:    11/09/2022 Time:    08:55    SANTA: mobile AV vegetations    Current Medications:     Infusions:   dexmedetomidine (PRECEDEX) infusion          Scheduled:   sodium chloride 0.9%   Intravenous Once    albuterol-ipratropium  3 mL Nebulization Q6H WAKE    amiodarone  200 mg Oral BID    aspirin  81 mg Oral Daily    atorvastatin  40 mg Oral QHS    ceFAZolin (ANCEF) IVPB  2 g Intravenous Q12H    enoxaparin  30 mg Subcutaneous Daily    insulin detemir U-100  15 Units Subcutaneous BID    metoprolol tartrate  25 mg Oral BID    pantoprazole  40 mg Intravenous Daily    polyethylene  glycol  17 g Per NG tube Daily    senna-docusate 8.6-50 mg  1 tablet Oral BID    sodium chloride 0.9%  500 mL Intravenous Once        PRN:  sodium chloride 0.9%, sodium chloride 0.9%, sodium chloride 0.9%, sodium chloride 0.9%, acetaminophen, aluminum-magnesium hydroxide-simethicone, dextrose 10%, dextrose 10%, dextrose 10%, dextrose 10%, dextrose 10%, dextrose 10%, glucagon (human recombinant), glucagon (human recombinant), glucose, glucose, heparin (porcine), influenza 65up-adj, insulin aspart U-100, magnesium oxide, magnesium oxide, magnesium sulfate IVPB, melatonin, naloxone, ondansetron, oxyCODONE-acetaminophen, potassium bicarbonate, potassium bicarbonate, potassium bicarbonate, potassium chloride, potassium, sodium phosphates, potassium, sodium phosphates, simethicone, sodium chloride 0.9%, sodium chloride 0.9%, sodium chloride 0.9%, sodium chloride 0.9%

## 2022-11-13 NOTE — SUBJECTIVE & OBJECTIVE
Interval History/Significant Events:  No acute overnight event.   Patient is off pressor.  Dialyzed yesterday with removal of 500cc of fluid    Review of Systems Unable to assess  Objective:     Vital Signs (Most Recent):  Temp: 97.7 °F (36.5 °C) (11/13/22 1526)  Pulse: 75 (11/13/22 1600)  Resp: 20 (11/13/22 1600)  BP: 130/69 (11/13/22 1600)  SpO2: (!) 93 % (11/13/22 1600)   Vital Signs (24h Range):  Temp:  [97 °F (36.1 °C)-97.8 °F (36.6 °C)] 97.7 °F (36.5 °C)  Pulse:  [62-75] 75  Resp:  [17-32] 20  SpO2:  [90 %-98 %] 93 %  BP: ()/(50-70) 130/69   Weight: 94.5 kg (208 lb 5.4 oz)  Body mass index is 29.89 kg/m².      Intake/Output Summary (Last 24 hours) at 11/13/2022 1726  Last data filed at 11/13/2022 1500  Gross per 24 hour   Intake 2168.34 ml   Output 560 ml   Net 1608.34 ml       Physical Exam  Constitutional:       Appearance: He is ill-appearing. He is not toxic-appearing.      Interventions: He is sedated and intubated.   HENT:      Head: Normocephalic and atraumatic.      Mouth/Throat:      Mouth: Mucous membranes are moist.   Eyes:      Conjunctiva/sclera: Conjunctivae normal.      Pupils: Pupils are equal, round, and reactive to light.   Cardiovascular:      Rate and Rhythm: Normal rate and regular rhythm.      Heart sounds: Normal heart sounds.   Pulmonary:      Effort: Pulmonary effort is normal. He is intubated.      Breath sounds: Rales (bibasilar) present.   Abdominal:      General: There is no distension.      Palpations: Abdomen is soft.      Tenderness: There is no abdominal tenderness. There is no guarding.   Musculoskeletal:         General: Normal range of motion.      Cervical back: Normal range of motion and neck supple.      Right lower leg: Edema present.      Left lower leg: Edema present.   Skin:     General: Skin is warm and dry.   Neurological:      General: No focal deficit present.      Sensory: No sensory deficit.      Comments: Facial droop   Psychiatric:      Comments: Unable  to assess     Vents:  Vent Mode: A/CMV-PC (11/13/22 1526)  Set Rate: 16 BPM (11/13/22 1526)  Vt Set: 470 mL (11/13/22 0929)  PEEP/CPAP: 7.8 cmH20 (11/13/22 1526)  Oxygen Concentration (%): 40 (11/13/22 1600)  Peak Airway Pressure: 24.5 cmH2O (11/13/22 1526)  Plateau Pressure: 14 cmH20 (11/13/22 1526)  Total Ve: 12.3 L/m (11/13/22 1526)  F/VT Ratio<105 (RSBI): (!) 38.18 (11/13/22 1526)  Lines/Drains/Airways       Central Venous Catheter Line  Duration             Trialysis (Dialysis) Catheter 11/12/22 1324 right internal jugular 1 day              Drain  Duration                  NG/OG Tube 11/09/22 0805 orogastric 16 Fr. Right mouth 4 days         Urethral Catheter 11/09/22 0815 Non-latex 14 Fr. 4 days              Airway  Duration                  Airway - Non-Surgical 11/09/22 0804 Endotracheal Tube 4 days              Peripheral Intravenous Line  Duration                  Peripheral IV - Single Lumen 11/08/22 2050 20 G Distal;Left;Posterior Forearm 4 days         Peripheral IV - Single Lumen 11/09/22 0500 20 G Posterior;Right Forearm 4 days         Peripheral IV - Single Lumen 11/09/22 0900 18 G Anterior;Distal;Left Upper Arm 4 days         Peripheral IV - Single Lumen 11/10/22 0044 18 G Right Forearm 3 days                  Significant Labs:    CBC/Anemia Profile:  Recent Labs   Lab 11/12/22  0621 11/13/22 0448   WBC 14.58* 15.07*   HGB 13.4* 12.6*   HCT 37.9* 35.2*   * 131*   MCV 88 87   RDW 13.4 13.4        Chemistries:  Recent Labs   Lab 11/13/22  0028 11/13/22  0448 11/13/22  1218   * 130* 133*   K 4.4 4.6 4.3   CL 96 97 96   CO2 15* 21* 15*   BUN 70* 75* 80*   CREATININE 4.9* 5.4* 5.7*   CALCIUM 7.6* 7.5* 7.8*       All pertinent labs within the past 24 hours have been reviewed.    Significant Imaging:  I have reviewed all pertinent imaging results/findings within the past 24 hours.  I have reviewed and interpreted all pertinent imaging results/findings within the past 24 hours.

## 2022-11-13 NOTE — PLAN OF CARE
Patient on vent with documented settings.  Alarms are set and functioning with adequate volumes.  AMBU bag and mask at bedside. Aerosol treatments. Will continue to monitor.

## 2022-11-13 NOTE — ASSESSMENT & PLAN NOTE
Likely 2/2 COPD  CXR showing subtle bibasilar interstitial opacities.  No focal consolidation  CT chest 10/2021: Centrilobular emphysema with an upper lobe predominance.  Bibasilar crackles on exam  Standing duonebs  Started on azithromycin and prednisone; fevers overnight, broadened antibiotics and repeating flu/RSV/COVID  -now with bacteremia  Supp O2 PRN to keep O2 sat 88-92%  TTE  Strict I&O  Hold IV fluids

## 2022-11-13 NOTE — PROGRESS NOTES
11/12/22 1835   Vital Signs   Temp 97 °F (36.1 °C)   Temp src Axillary   Pulse 62   Heart Rate Source Right;Monitor   Resp 17   SpO2 98 %   Oxygen Concentration (%) 40   O2 Device (Oxygen Therapy) ventilator   /68   BP Location Right arm   BP Method Automatic   Patient Position Lying   Post-Hemodialysis Assessment   Rinseback Volume (mL) 250 mL   Blood Volume Processed (Liters) 54 L   Dialyzer Clearance Clear   Duration of Treatment 180 minutes   Additional Fluid Intake (mL) 500 mL   Total UF (mL) 500 mL   Net Fluid Removal 0   Patient Response to Treatment well   Post-Hemodialysis Comments pt remains sedated, sr on monitor, bbs coarse, abd soft with + bowel sounds

## 2022-11-13 NOTE — CARE UPDATE
I discussed his care with the ICU team today.  The patient has maintained sinus rhythm.  He is afebrile.  He is on antibiotics that infectious Disease is managing related to endocarditis.    I will follow intermittently, repeat echo likely to be performed later in the coming week for completeness sake.  He remains intubated and sedated.  Hemodialysis initiated.

## 2022-11-13 NOTE — ASSESSMENT & PLAN NOTE
Workup in ED remarkable for hyperglycemia, HAGMA, elevated beta hydroxy.   New diagnosis of DM  DKA protocol  -A1c greater than 14; will need insulin at time of discharge  -reopened gap; transitioned back to insulin gtt  - now with concern for hypoglycemia; possibly due to insulin stacking, will hold off on dose today

## 2022-11-13 NOTE — NURSING
No significant changes over night. Cough, gag, and pupillary reflexes intact. Pt started Hemodialysis on 11/12, and tolerated well. Pressors remained off overnight. NSR throughout the night. Pt is intubated, weaned to 40% fiO2. O2 sats > 90%. UOP minimal~ 40ccs overnight. LBM this AM, x2 this shift. Continues on Diabetisource TF @ 10cc/hr.   Pt continues on Propofol, and Amiodarone. Report given to oncoming RNRj.

## 2022-11-13 NOTE — ASSESSMENT & PLAN NOTE
- suspect due to post-viral PNA with staph  - COVID and flu negative on admission  - on BSABx for now given acute decompensation  - empiric tamiflu stopped   - staph bacteremia, switched antibiotics to cefazolin

## 2022-11-13 NOTE — PROGRESS NOTES
Progress Note  Nephrology      Consult Requested By: Jermain Jackson, *      SUBJECTIVE:     Overnight events  Patient is a 67 y.o. male     Patient Active Problem List   Diagnosis    Major depressive disorder with current active episode    Anxiety    Bell's palsy    Insomnia    BPPV (benign paroxysmal positional vertigo)    Chronic vertigo    Benign essential HTN    Moderate episode of recurrent major depressive disorder    Acute hypoxemic respiratory failure    DKA (diabetic ketoacidosis)    COPD with acute exacerbation    Centrilobular emphysema    Bacterial pneumonia    Endotracheally intubated    Septic shock    Shock, unspecified    Atrial fibrillation with RVR    MSSA bacteremia    Acute renal failure with acute tubular necrosis superimposed on stage 3a chronic kidney disease    Chest pain of uncertain etiology    Acute bacterial endocarditis    NADIYA (acute kidney injury)     Past Medical History:   Diagnosis Date    Anxiety 8/5/2016    Bell's palsy     Benign essential HTN 4/27/2018    Lacunar infarction     Remote finding seen on cranial imaging during acute workup for Bell's palsy/[place]    Pneumonia     age 5    Psoriasis               OBJECTIVE:     Vitals:    11/13/22 0800 11/13/22 0804 11/13/22 0900 11/13/22 0929   BP:  (!) 96/55 (!) 104/58    BP Location:   Right arm    Patient Position:       Pulse: 64 62 68 68   Resp: 18 19 19 19   Temp: 97.3 °F (36.3 °C)      TempSrc: Axillary      SpO2: 95% 95% 96% 95%   Weight:       Height:           Temp: 97.3 °F (36.3 °C) (11/13/22 0800)  Pulse: 68 (11/13/22 0929)  Resp: 19 (11/13/22 0929)  BP: (!) 104/58 (11/13/22 0900)  SpO2: 95 % (11/13/22 0929)    Date 11/13/22 0700 - 11/14/22 0659   Shift 0544-9351 0166-5512 3189-0918 24 Hour Total   INTAKE   I.V.(mL/kg) 387.3(4.1)   387.3(4.1)   NG/GT 90   90   IV Piggyback 48.3   48.3   Shift Total(mL/kg) 525.7(5.6)   525.7(5.6)   OUTPUT   Shift Total(mL/kg)       Weight (kg) 94.5 94.5 94.5 94.5              Medications:   albuterol-ipratropium  3 mL Nebulization Q6H WAKE    amiodarone  200 mg Oral BID    aspirin  81 mg Oral Daily    atorvastatin  40 mg Oral QHS    ceFAZolin (ANCEF) IVPB  2 g Intravenous Q12H    enoxaparin  30 mg Subcutaneous Daily    insulin detemir U-100  15 Units Subcutaneous BID    metoprolol tartrate  25 mg Oral BID    pantoprazole  40 mg Intravenous Daily    polyethylene glycol  17 g Per NG tube Daily    senna-docusate 8.6-50 mg  1 tablet Oral BID    sodium chloride 0.9%  500 mL Intravenous Once      amiodarone in dextrose 5% 0.5 mg/min (11/13/22 0900)    NORepinephrine bitartrate-D5W Stopped (11/12/22 0054)    propofoL Stopped (11/13/22 0945)               Physical Exam:  General appearance:NAD  Awake  Lungs: diminished breath sounds  Intubated  Heart: pulse 68  Abdomen: soft  Extremities: edema  Skin: dry    Laboratory:  ABG  Labs reviewed  Recent Results (from the past 336 hour(s))   Basic metabolic panel    Collection Time: 11/13/22  4:48 AM   Result Value Ref Range    Sodium 130 (L) 136 - 145 mmol/L    Potassium 4.6 3.5 - 5.1 mmol/L    Chloride 97 95 - 110 mmol/L    CO2 21 (L) 23 - 29 mmol/L    BUN 75 (H) 8 - 23 mg/dL    Creatinine 5.4 (H) 0.5 - 1.4 mg/dL    Calcium 7.5 (L) 8.7 - 10.5 mg/dL    Anion Gap 12 8 - 16 mmol/L   Basic metabolic panel    Collection Time: 11/13/22 12:28 AM   Result Value Ref Range    Sodium 129 (L) 136 - 145 mmol/L    Potassium 4.4 3.5 - 5.1 mmol/L    Chloride 96 95 - 110 mmol/L    CO2 15 (L) 23 - 29 mmol/L    BUN 70 (H) 8 - 23 mg/dL    Creatinine 4.9 (H) 0.5 - 1.4 mg/dL    Calcium 7.6 (L) 8.7 - 10.5 mg/dL    Anion Gap 18 (H) 8 - 16 mmol/L   Basic metabolic panel    Collection Time: 11/12/22  5:56 PM   Result Value Ref Range    Sodium 133 (L) 136 - 145 mmol/L    Potassium 3.5 3.5 - 5.1 mmol/L    Chloride 98 95 - 110 mmol/L    CO2 17 (L) 23 - 29 mmol/L    BUN 54 (H) 8 - 23 mg/dL    Creatinine 3.6 (H) 0.5 - 1.4 mg/dL    Calcium 7.9 (L) 8.7 - 10.5 mg/dL     Anion Gap 18 (H) 8 - 16 mmol/L     Recent Results (from the past 336 hour(s))   CBC auto differential    Collection Time: 11/13/22  4:48 AM   Result Value Ref Range    WBC 15.07 (H) 3.90 - 12.70 K/uL    Hemoglobin 12.6 (L) 14.0 - 18.0 g/dL    Hematocrit 35.2 (L) 40.0 - 54.0 %    Platelets 131 (L) 150 - 450 K/uL   CBC Auto Differential    Collection Time: 11/12/22  6:21 AM   Result Value Ref Range    WBC 14.58 (H) 3.90 - 12.70 K/uL    Hemoglobin 13.4 (L) 14.0 - 18.0 g/dL    Hematocrit 37.9 (L) 40.0 - 54.0 %    Platelets 113 (L) 150 - 450 K/uL   CBC Auto Differential    Collection Time: 11/11/22  5:31 AM   Result Value Ref Range    WBC 14.43 (H) 3.90 - 12.70 K/uL    Hemoglobin 14.3 14.0 - 18.0 g/dL    Hematocrit 41.0 40.0 - 54.0 %    Platelets 90 (L) 150 - 450 K/uL     Urinalysis  No results for input(s): COLORU, CLARITYU, SPECGRAV, PHUR, PROTEINUA, GLUCOSEU, BILIRUBINCON, BLOODU, WBCU, RBCU, BACTERIA, MUCUS, NITRITE, LEUKOCYTESUR, UROBILINOGEN, HYALINECASTS in the last 24 hours.    Diagnostic Results:  X-Ray: Reviewed  US: Reviewed  Echo: Reviewed  ACCESS    ASSESSMENT/PLAN:     Acute Bacterial Endocarditis  Blood culture 11/10 - STAPHYLOCOCCUS AUREUS   On cefazolin  Hypotensive  Blood pressure 104/58  Sinus rhythm  On amiodarone   Norepinephrine off  Echo-   The left ventricle is normal in size with normal systolic function.  The estimated ejection fraction is 55%.  Mild mitral regurgitation.  Normal right ventricular size with normal right ventricular systolic function.  DKA on admission  NADIYA/ CKD  3   cc  Fluid balance positive 7,703 cc  UA protein 2+, blood 2+, glucose 4+, katie 1+  UA STAPHYLOCOCCUS AUREUS   10,000 - 49,999 cfu/ml   UA sodium < 20, osmolality 373  US  Findings in keeping with medical renal disease.  No evidence of hydronephrosis.  Creatinine 5.4  GFR 11 cc/min  Azotemia   Hypercatabolic   On steroids  BUN 75  Metabolic bone disease  Hyponatremia   Na 130  Metabolic acidosis   Anemia  multifactorial  Hb 12.6  Poor nutrition  Albumin 2  Tube feeding 10 cc/h  CXR-  Endotracheal tube tip lies approximately 6 cm above the mamie.  Right central venous catheter tip projects over the mid SVC.  Nasogastric tube courses below the diaphragm, beyond the field of view.  There is obscuration of the left costophrenic angle, worsened since the previous exam suggesting worsening effusion.  There is increased parenchymal attenuation projected over the left lower lung zone suggesting developing consolidation.  Pulmonary interstitial findings suggest superimposed edema.  There is no pneumothorax.  The osseous structures are stable.  On 50 % O2  Dialysis/ cc yesterday  Lasix 100 mg IV now.   Weight daily  I and O  Avoid hypotension,  nephrotoxic agents.  Discussed with the  ICU nurse.

## 2022-11-13 NOTE — PLAN OF CARE
Problem: Adult Inpatient Plan of Care  Goal: Plan of Care Review  Outcome: Ongoing, Not Progressing  Goal: Patient-Specific Goal (Individualized)  Outcome: Ongoing, Not Progressing  Goal: Absence of Hospital-Acquired Illness or Injury  Outcome: Ongoing, Not Progressing  Goal: Optimal Comfort and Wellbeing  Outcome: Ongoing, Not Progressing     Problem: Diabetes Comorbidity  Goal: Blood Glucose Level Within Targeted Range  Outcome: Ongoing, Not Progressing     Problem: Adjustment to Illness COPD (Chronic Obstructive Pulmonary Disease)  Goal: Optimal Chronic Illness Coping  Outcome: Ongoing, Not Progressing     Problem: Functional Ability Impaired COPD (Chronic Obstructive Pulmonary Disease)  Goal: Optimal Level of Functional [place]  Outcome: Ongoing, Not Progressing     Problem: Respiratory Compromise COPD (Chronic Obstructive Pulmonary Disease)  Goal: Effective Oxygenation and Ventilation  Outcome: Ongoing, Not Progressing     Problem: Communication Impairment (Mechanical Ventilation, Invasive)  Goal: Effective Communication  Outcome: Ongoing, Not Progressing     Problem: Device-Related Complication Risk (Mechanical Ventilation, Invasive)  Goal: Optimal Device Function  Outcome: Ongoing, Not Progressing     Problem: Inability to Wean (Mechanical Ventilation, Invasive)  Goal: Mechanical Ventilation Liberation  Outcome: Ongoing, Not Progressing     Problem: Nutrition Impairment (Mechanical Ventilation, Invasive)  Goal: Optimal Nutrition Delivery  Outcome: Ongoing, Not Progressing     Problem: Skin and Tissue Injury (Mechanical Ventilation, Invasive)  Goal: Absence of Device-Related Skin and Tissue Injury  Outcome: Ongoing, Not Progressing     Problem: Ventilator-Induced Lung Injury (Mechanical Ventilation, Invasive)  Goal: Absence of Ventilator-Induced Lung Injury  Outcome: Ongoing, Not Progressing     Problem: Communication Impairment (Artificial Airway)  Goal: Effective Communication  Outcome: Ongoing, Not  Progressing     Problem: Device-Related Complication Risk (Artificial Airway)  Goal: Optimal Device Function  Outcome: Ongoing, Not Progressing     Problem: Skin and Tissue Injury (Artificial Airway)  Goal: Absence of Device-Related Skin or Tissue Injury  Outcome: Ongoing, Not Progressing     Problem: Infection  Goal: Absence of Infection Signs and Symptoms  Outcome: Ongoing, Not Progressing     Problem: Fall Injury Risk  Goal: Absence of Fall and Fall-Related Injury  Outcome: Ongoing, Not Progressing     Problem: Restraint, Nonbehavioral (Nonviolent)  Goal: Absence of Harm or Injury  Outcome: Ongoing, Not Progressing     Problem: Fluid Imbalance (Pneumonia)  Goal: Fluid Balance  Outcome: Ongoing, Not Progressing     Problem: Infection (Pneumonia)  Goal: Resolution of Infection Signs and Symptoms  Outcome: Ongoing, Not Progressing     Problem: Respiratory Compromise (Pneumonia)  Goal: Effective Oxygenation and Ventilation  Outcome: Ongoing, Not Progressing     Problem: Adjustment to Illness (Sepsis/Septic Shock)  Goal: Optimal Coping  Outcome: Ongoing, Not Progressing     Problem: Bleeding (Sepsis/Septic Shock)  Goal: Absence of Bleeding  Outcome: Ongoing, Not Progressing     Problem: Glycemic Control Impaired (Sepsis/Septic Shock)  Goal: Blood Glucose Level Within Desired Range  Outcome: Ongoing, Not Progressing     Problem: Infection Progression (Sepsis/Septic Shock)  Goal: Absence of Infection Signs and Symptoms  Outcome: Ongoing, Not Progressing     Problem: Nutrition Impaired (Sepsis/Septic Shock)  Goal: Optimal Nutrition Intake  Outcome: Ongoing, Not Progressing     Problem: Fluid and Electrolyte Imbalance (Acute Kidney Injury/Impairment)  Goal: Fluid and Electrolyte Balance  Outcome: Ongoing, Not Progressing     Problem: Oral Intake Inadequate (Acute Kidney Injury/Impairment)  Goal: Optimal Nutrition Intake  Outcome: Ongoing, Not Progressing     Problem: Renal Function Impairment (Acute Kidney  Injury/Impairment)  Goal: Effective Renal Function  Outcome: Ongoing, Not Progressing     Problem: Device-Related Complication Risk (Hemodialysis)  Goal: Safe, Effective Therapy Delivery  Outcome: Ongoing, Not Progressing     Problem: Hemodynamic Instability (Hemodialysis)  Goal: Effective Tissue Perfusion  Outcome: Ongoing, Not Progressing     Problem: Infection (Hemodialysis)  Goal: Absence of Infection Signs and Symptoms  Outcome: Ongoing, Not Progressing

## 2022-11-14 PROBLEM — R78.81 BACTEREMIA: Status: ACTIVE | Noted: 2022-01-01

## 2022-11-14 PROBLEM — M71.021 ABSCESS OF BURSA OF RIGHT ELBOW: Status: ACTIVE | Noted: 2022-01-01

## 2022-11-14 PROBLEM — T82.898A PROBLEM WITH DIALYSIS ACCESS: Status: ACTIVE | Noted: 2022-01-01

## 2022-11-14 NOTE — ASSESSMENT & PLAN NOTE
Patient with acute kidney injury likely due to acute tubular necrosis NADIYA is currently worsening. Labs reviewed- Renal function/electrolytes with Estimated Creatinine Clearance: 12.3 mL/min (A) (based on SCr of 6.5 mg/dL (H)). according to latest data. Monitor urine output and serial BMP and adjust therapy as needed. Avoid nephrotoxins and renally dose meds for GFR listed above.   - likely due to septic shock/DKA  - BP support with pressors as needed  - IV lasix trial without improvement  - Nephrology consultation:  Initiated dialysis 11/12, well tolerated  - additional IV Lasix trial on 11/13 unsuccessful  -plan for dialysis today

## 2022-11-14 NOTE — ASSESSMENT & PLAN NOTE
Workup in ED remarkable for hyperglycemia, HAGMA, elevated beta hydroxy.   New diagnosis of DM  DKA protocol  -A1c greater than 14; will need insulin at time of discharge  -reopened gap; transitioned back to insulin gtt  - now with concern for hypoglycemia; possibly due to insulin stacking  -adjust to detemir 10 units b.i.d.

## 2022-11-14 NOTE — PROGRESS NOTES
[facility] - Intensive Care  Critical Care Medicine  Progress Note    Patient Name: Jose Luis Rendon  MRN: [id number]  Admission Date: 11/7/2022  Hospital Length of Stay: 7 days  Code Status: Full Code  Attending Provider: Jermain Jackson, *  Primary Care Provider: Deric Armstrong MD   Principal Problem: Bacteremia due to group B Streptococcus    Subjective:     HPI:  67 year old male with history of HTN, lacunar infarct, anxiety and Bell's Palsy that presented to the ED for the evaluation fo 3 days worsening exertional SOB, 1 pillow orthopnea and left sided shoulder pain that radiates to back and chest pain. Patient discloses lower extremities edema but no cough or fever. Patient discloses no history of heart disease or lung disease.   Patient has significant past history of smoking (45 year pack history; stopped in 2015).  On presentation, patient was found to have mild DKA and started on IVF and insulin drip.   MICU was called for desaturation. Examination remarkable for bi-basilar crackles and lower extremities pitting edema.   CXR showed increased interstitial markings  Troponin is negative. EKG showed RBBB (old) and T wave abnormality in the inferior leads (new)  Review of old imagings, CT scan of 10/28/2021 showed centrilobular emphysema with an upper lobe predominance and multiple bilateral solid pulmonary nodules.  Bed side Cardiac and lung US showed bilateral b lines (right > left) with hyper-dynamic heart  IVF was discontinued and one dose of IV lasix ordered    Blood culture positive for staphylococcus aureus. SANTA of 11/11 showed AV with mobile vegetation/mass but no AR, tri leaflet valve without any obvious anatomic abnormalities. Patient on Cefazolin.    11/12: Patient is volume overload with minimal response to IV diuretics. Dialysis was initiated  11/14: Dialysis stopped working due to access issue        Interval History/Significant Events:   No acute overnight event  Patient remains off  pressor  Dialysis staff only able to remove 200 cc of fluid yesterday due to access issue  We plan to place a new dialysis catheter today    Review of Systems Unable to obtain  Objective:     Vital Signs (Most Recent):  Temp: 98.2 °F (36.8 °C) (11/14/22 1108)  Pulse: 71 (11/14/22 1120)  Resp: 20 (11/14/22 1120)  BP: (!) 120/56 (11/14/22 1108)  SpO2: (!) 94 % (11/14/22 1120)   Vital Signs (24h Range):  Temp:  [97.5 °F (36.4 °C)-98.2 °F (36.8 °C)] 98.2 °F (36.8 °C)  Pulse:  [64-87] 71  Resp:  [14-27] 20  SpO2:  [89 %-98 %] 94 %  BP: ()/(48-80) 120/56   Weight: 94 kg (207 lb 3.7 oz)  Body mass index is 29.73 kg/m².      Intake/Output Summary (Last 24 hours) at 11/14/2022 1200  Last data filed at 11/14/2022 1159  Gross per 24 hour   Intake 1335.91 ml   Output -130 ml   Net 1465.91 ml       Physical Exam  Constitutional:       Appearance: He is ill-appearing. He is not toxic-appearing.      Interventions: He is sedated and intubated.   HENT:      Head: Normocephalic and atraumatic.      Mouth/Throat:      Mouth: Mucous membranes are moist.   Eyes:      Conjunctiva/sclera: Conjunctivae normal.      Pupils: Pupils are equal, round, and reactive to light.   Cardiovascular:      Rate and Rhythm: Normal rate and regular rhythm.      Heart sounds: Normal heart sounds.   Pulmonary:      Effort: Pulmonary effort is normal. He is intubated.      Breath sounds: Rales (bibasilar) present.   Abdominal:      General: There is no distension.      Palpations: Abdomen is soft.      Tenderness: There is no abdominal tenderness. There is no guarding.   Musculoskeletal:         General: Normal range of motion.      Cervical back: Normal range of motion and neck supple.      Right lower leg: Edema present.      Left lower leg: Edema present.   Skin:     General: Skin is warm and dry.   Neurological:      General: No focal deficit present.      Sensory: No sensory deficit.      Comments: Facial droop   Psychiatric:      Comments:  Unable to assess     Vents:  Vent Mode: A/CMV-PC (11/14/22 1120)  Set Rate: 16 BPM (11/14/22 1120)  Vt Set: 470 mL (11/13/22 0929)  PEEP/CPAP: 7 cmH20 (11/14/22 1120)  Oxygen Concentration (%): 60 (11/14/22 1120)  Peak Airway Pressure: 30.2 cmH2O (11/14/22 1120)  Plateau Pressure: 19.1 cmH20 (11/14/22 0538)  Total Ve: 10.8 L/m (11/14/22 1120)  F/VT Ratio<105 (RSBI): (!) 36.97 (11/14/22 1120)  Lines/Drains/Airways       Central Venous Catheter Line  Duration             Trialysis (Dialysis) Catheter 11/12/22 1324 right internal jugular 1 day              Drain  Duration                  NG/OG Tube 11/09/22 0805 orogastric 16 Fr. Right mouth 5 days         Urethral Catheter 11/09/22 0815 Non-latex 14 Fr. 5 days         Rectal Tube 11/14/22 1158 rectal tube w/ balloon (indicate number of mLs) <1 day              Airway  Duration                  Airway - Non-Surgical 11/09/22 0804 Endotracheal Tube 5 days              Peripheral Intravenous Line  Duration                  Peripheral IV - Single Lumen 11/08/22 2050 20 G Distal;Left;Posterior Forearm 5 days         Peripheral IV - Single Lumen 11/09/22 0900 18 G Anterior;Distal;Left Upper Arm 5 days         Peripheral IV - Single Lumen 11/10/22 0044 18 G Right Forearm 4 days                  Significant Labs:    CBC/Anemia Profile:  Recent Labs   Lab 11/13/22  0448 11/14/22  0550   WBC 15.07* 22.38*   HGB 12.6* 13.1*   HCT 35.2* 37.3*   * 117*   MCV 87 87   RDW 13.4 13.8        Chemistries:  Recent Labs   Lab 11/13/22  1218 11/13/22  1752 11/14/22  0550   * 133* 131*   K 4.3 4.1 4.8   CL 96 98 96   CO2 15* 15* 15*   BUN 80* 79* 96*   CREATININE 5.7* 5.4* 6.5*   CALCIUM 7.8* 7.6* 7.6*       All pertinent labs within the past 24 hours have been reviewed.    Significant Imaging:  I have reviewed all pertinent imaging results/findings within the past 24 hours.  I have reviewed and interpreted all pertinent imaging results/findings within the past 24  hours.      ABG  Recent Labs   Lab 11/10/22  0543   PH 7.326*   PO2 70*   PCO2 43.9   HCO3 23.0*   BE -3     Assessment/Plan:     Pulmonary  Acute hypoxemic respiratory failure  Acute hypoxemic respiratory failure due to COPD exacerbation and pneumonia     ECHO showed mild concentric LVH, EF 55%, Mild right ventricular enlargement with normal right ventricular systolic function, PASP 29 and CVP of 3     Recommendations:  Continue ventilatory support, Ppeak < 40 and Pplat < 30  Daily SAT/SBT   Continue DuoNebs  Status post IV steroid therapy for COPD exacerbation  Volume overload is precluding extubation     Cardiac/Vascular  Acute bacterial staphy aureus endocarditis  Repeat blood culture of 11/12 is no growth  Continue cefazolin   ID following    Atrial fibrillation with RVR  Rate is controlled  Continue amiodarone and metoprolol    Renal/  NADIYA (acute kidney injury)  Likely due to ATN  Minimal urine output and creatinine rising  Dialysis initiated, access not working, plan to replace the access today     Hypervolemic hyponatremia  Dialysis should help     ID  Septic shock, resolved  Continue antibiotic  Off pressor        Endocrine  * DKA (diabetic ketoacidosis)  Mild DKA, resolved  New diagnosis of Diabetes Mellitus to patient, of note, HBA1c last year was 7.3, now > 14     Glucose at goal  Continue insulin therapy     Thrombocytopenia  HIT score of 1 (low probability for HIT), Likely due to sepsis  B12 and folate acid level normal  Improving, continue to monitor          Critical Care Daily Checklist:     A: Awake: RASS Goal/Actual Goal: RASS Goal: -2-->light sedation  Actual: Casiano Agitation Sedation Scale (RASS): Light sedation   B: Spontaneous Breathing Trial Performed?  Yes   C: SAT & SBT Coordinated?  Yes                     D: Delirium: CAM-ICU Overall CAM-ICU: Positive   E: Early Mobility Performed? No   F: Feeding Goal:TF    AS: Analgesia/Sedation On Precedex   T: Thromboembolic Prophylaxis  Enoxaparin   H: HOB > 300 YES   U: Stress Ulcer Prophylaxis (if needed) PPI   G: Glucose Control Improving on insulin   B: Bowel Function Stool Occurrence: 1   I: Indwelling Catheter (Lines & Tran) Necessity Indicated   D: De-escalation of Antimicrobials/Pharmacotherapies Yes     Plan for the day/ETD dialysis     Code Status:  Family/Goals of Care: Full Code                 Critical secondary to Patient has a condition that poses threat to life and bodily function: Severe Respiratory Distress      Critical care was time spent personally by me on the following activities: development of treatment plan with patient or surrogate and bedside caregivers, discussions with consultants, evaluation of patient's response to treatment, examination of patient, ordering and performing treatments and interventions, ordering and review of laboratory studies, ordering and review of radiographic studies, pulse oximetry, re-evaluation of patient's condition. This critical care time did not overlap with that of any other provider or involve time for any procedures.     Naty Cazares MD  Critical Care Medicine  [facility] Intensive Care

## 2022-11-14 NOTE — PROGRESS NOTES
[facility] Progress Note    Assessment/Recommendations:     66yo man w/a history of HTN, prior CVA, Bell's palsy, COPD, and tobacco use who was admitted on 2022 with 4 days of CP, SOB, N/V, and malaise and was found to have polymicrobial bacteremia (MSSA, GBS) due to native AV endocarditis with associated DKA from newly diagnosed diabetes (A1c>14%). His course has been c/b hypoxic RF (requiring intubation), septic shock (with brief pressor requirement), AF w/RVR, (s/p amio load), and NADIYA. ID has been consulted to help tailor antibiotics and assist in source evaluation. He has weaned from pressors and improved on tailored cefazolin while awaiting culture clearance (uncertain source but possible indolent skin vs pulmonary source of polymicrobial bacteremia given organisms identified).     - would continue cefazolin at 1g q24h for now (on lasix trial again, dosed CrCl <10)  - await pending repeat blood cx to assess for clearance; repeat BC from  no growth to date  - will require 6wk course of antibiotics -- no PICC or midline permanent placement until culture clearance has been documented  - will assess for sites of metastatic seeding of MSSA infection following extubation when full history can be obtained     ID will follow.     Toby Torres MD  [facility]  Subjective:      No acute events. Afebrile. Still medically sedated and ventilated. Not requiring pressors.     Objective:   Last 24 Hour Vital Signs:  BP  Min: 81/48  Max: 165/80  Temp  Av.8 °F (36.6 °C)  Min: 97.5 °F (36.4 °C)  Max: 98.2 °F (36.8 °C)  Pulse  Av.2  Min: 64  Max: 87  Resp  Av.2  Min: 14  Max: 27  SpO2  Av.1 %  Min: 89 %  Max: 98 %  Weight  Av kg (207 lb 3.7 oz)  Min: 94 kg (207 lb 3.7 oz)  Max: 94 kg (207 lb 3.7 oz)  I/O last 3 completed shifts:  In: 2225 [I.V.:596.9; Other:500; NG/GT:805; IV Piggyback:323.1]  Out: -130 [Urine:105]    Physical Exam  Constitutional:       Appearance: He is  ill-appearing.      Comments: Sedated on ventilator   HENT:      Head: Atraumatic.      Right Ear: External ear normal.      Left Ear: External ear normal.      Mouth/Throat:      Comments: ET tube in place  Cardiovascular:      Rate and Rhythm: Normal rate and regular rhythm.      Heart sounds: Normal heart sounds.   Pulmonary:      Comments: Air movement bilateral lungs on auscultation  Abdominal:      General: There is no distension.      Palpations: Abdomen is soft.   Musculoskeletal:         General: No deformity.   Skin:     General: Skin is warm.   Neurological:      Comments: Medically sedated. Unable to perform full neurological assessment      Vent Mode: A/CMV-PC  Oxygen Concentration (%):  [40-60] 60  Resp Rate Total:  [18 br/min-30 br/min] 21 br/min  PEEP/CPAP:  [4.4 cmH20-8.9 cmH20] 7 cmH20  Mean Airway Pressure:  [10.8 wzC54-34 cmH20] 11.8 cmH20      Laboratory:  Laboratory Data Reviewed: yes  Pertinent Findings:  Recent Labs   Lab 11/09/22 2007 11/09/22  2352 11/12/22  0621 11/12/22  1226 11/13/22  0448 11/13/22  1218 11/13/22  1752 11/14/22  0550   WBC  --    < > 14.58*  --  15.07*  --   --  22.38*   HGB  --    < > 13.4*  --  12.6*  --   --  13.1*   HCT  --    < > 37.9*  --  35.2*  --   --  37.3*   PLT  --    < > 113*  --  131*  --   --  117*   MCV  --    < > 88  --  87  --   --  87   RDW  --    < > 13.4  --  13.4  --   --  13.8   *   < > 128*   < > 130* 133* 133* 131*   K 4.0   < > 4.9   < > 4.6 4.3 4.1 4.8   CL 96   < > 95   < > 97 96 98 96   CO2 18*   < > 19*   < > 21* 15* 15* 15*   BUN 62*   < > 107*   < > 75* 80* 79* 96*   CREATININE 3.0*   < > 6.4*   < > 5.4* 5.7* 5.4* 6.5*   *  545*   < > 208*   < > 126* 64* 77 120*   PROT 5.9*  --   --   --   --   --   --   --    ALBUMIN 2.0*  --   --   --   --   --   --   --    BILITOT 1.2*  --   --   --   --   --   --   --    AST 59*  --   --   --   --   --   --   --    ALKPHOS 131  --   --   --   --   --   --   --    ALT 58*  --   --   --    --   --   --   --     < > = values in this interval not displayed.           Microbiology Data:  11/9 blood cx: MSSA/GBS in first sets; MSSA only in later sets  11/9 urine culture MSSA  11/10 blood cx: MSSA  11/10 sputum cx MSSA  11/12 blood cx NGTD        Antimicrobials:  Cefazolin    Other Results:  Radiology Results:  X-Ray Chest 1 View    Result Date: 11/9/2022  EXAMINATION: XR CHEST 1 VIEW CLINICAL HISTORY: ETT placement; TECHNIQUE: Single frontal view of the chest was performed. COMPARISON: 11/08/2022 FINDINGS: Endotracheal tube tip projects approximately 5 cm above the mamie.  Enteric tube courses through the left upper quadrant however extends past field of view. Mediastinal structures are midline.  Stable cardiomediastinal silhouette.  Aortic calcification. Stable diffuse bilateral interstitial attenuation which could represent pulmonary edema or infection.  No new focal airspace opacity.  Slightly increased obscuration of the left hemidiaphragm and costophrenic angle suggestive for slightly increased volume left pleural effusion and atelectasis.  No pneumothorax.     As above. Electronically signed by: Sathish Candelaria Date:    11/09/2022 Time:    08:55    SANTA: mobile AV vegetations    Current Medications:     Infusions:   dexmedetomidine (PRECEDEX) infusion 0.3 mcg/kg/hr (11/14/22 0731)        Scheduled:   sodium chloride 0.9%   Intravenous Once    albuterol-ipratropium  3 mL Nebulization Q6H WAKE    amiodarone  200 mg Oral BID    aspirin  81 mg Oral Daily    atorvastatin  40 mg Oral QHS    ceFAZolin (ANCEF) IVPB  1 g Intravenous Q24H    enoxaparin  30 mg Subcutaneous Daily    insulin detemir U-100  10 Units Subcutaneous BID    metoprolol tartrate  25 mg Oral BID    pantoprazole  40 mg Intravenous Daily    polyethylene glycol  17 g Per NG tube Daily    senna-docusate 8.6-50 mg  1 tablet Oral BID        PRN:  sodium chloride 0.9%, sodium chloride 0.9%, sodium chloride 0.9%, sodium chloride 0.9%, sodium  chloride 0.9%, acetaminophen, aluminum-magnesium hydroxide-simethicone, dextrose 10%, dextrose 10%, dextrose 10%, dextrose 10%, dextrose 10%, dextrose 10%, glucagon (human recombinant), glucagon (human recombinant), glucose, glucose, heparin (porcine), influenza 65up-adj, insulin aspart U-100, magnesium oxide, magnesium oxide, magnesium sulfate IVPB, melatonin, naloxone, ondansetron, oxyCODONE-acetaminophen, potassium bicarbonate, potassium bicarbonate, potassium bicarbonate, potassium chloride, potassium, sodium phosphates, potassium, sodium phosphates, simethicone, sodium chloride 0.9%, sodium chloride 0.9%, sodium chloride 0.9%, sodium chloride 0.9%, sodium chloride 0.9%

## 2022-11-14 NOTE — PROGRESS NOTES
Pharmacist Renal Dose Adjustment Note    Jose Luis Rendon is a 67 y.o. male being treated with the medication cefazolin    Patient Data:    Vital Signs (Most Recent):  Temp: 97.5 °F (36.4 °C) (11/14/22 0716)  Pulse: 70 (11/14/22 1030)  Resp: 19 (11/14/22 1030)  BP: (!) 100/53 (11/14/22 1000)  SpO2: 95 % (11/14/22 1030)   Vital Signs (72h Range):  Temp:  [96.5 °F (35.8 °C)-98.5 °F (36.9 °C)]   Pulse:  []   Resp:  [14-42]   BP: ()/(48-80)   SpO2:  [89 %-99 %]      Recent Labs   Lab 11/13/22  1218 11/13/22  1752 11/14/22  0550   CREATININE 5.7* 5.4* 6.5*     Serum creatinine: 6.5 mg/dL (H) 11/14/22 0550  Estimated creatinine clearance: 12.3 mL/min (A)    Medication:cefazolin dose: 2grams frequency q12h will be changed to medication:cefazolin dose:1gram frequency:q24h AD    Pharmacist's Name: Gaurav Flowers  Pharmacist's Extension: [phone]

## 2022-11-14 NOTE — PROGRESS NOTES
Nephrology Progress Note     Consult Requested By: Jermain Jackson, *  Reason for Consult: NADIYA     SUBJECTIVE:        ?    Review of Systems   Unable to perform ROS: Acuity of condition     Past Medical History:   Diagnosis Date    Anxiety 2016    Bell's palsy     Benign essential HTN 2018    Lacunar infarction     Remote finding seen on cranial imaging during acute workup for Bell's palsy/[place] [facility]    Pneumonia     age 5    Psoriasis      Past Surgical History:   Procedure Laterality Date    BACK SURGERY N/A     bulging L5    TONSILLECTOMY       Family History   Problem Relation Age of Onset    Lung disease Mother     Glaucoma Mother     Cataracts Mother     Bladder Cancer Father     Alzheimer's disease Father     Glaucoma Father     Prostate cancer Father     Rheum arthritis Sister     Lupus Sister     Coronary artery disease Maternal Grandmother         nonpremature    Colon cancer Neg Hx      Social History     Tobacco Use    Smoking status: Former     Packs/day: 1.00     Years: 45.00     Pack years: 45.00     Types: Cigarettes     Quit date: 2015     Years since quittin.8   Substance Use Topics    Alcohol use: Yes     Alcohol/week: 0.0 standard drinks     Comment: rare    Drug use: No       Review of patient's allergies indicates:   Allergen Reactions    Bee sting [allergen ext-venom-honey bee] Anaphylaxis and Swelling    Venom-yellow jacket Swelling            OBJECTIVE:     Vital Signs (Most Recent)  Vitals:    22 0945 22 1000 22 1015 22 1030   BP:  (!) 100/53     BP Location:       Patient Position:       Pulse: 69 67 68 70   Resp: 14 14 18 19   Temp:       TempSrc:       SpO2: (!) 91% (!) 92% (!) 93% 95%   Weight:       Height:             Date 22 0700 - 11/15/22 0659   Shift 6790-0691 6010-7180 7876-8195 24 Hour Total   INTAKE   I.V.(mL/kg) 93(1)   93(1)   IV Piggyback 12.9   12.9   Shift Total(mL/kg) 105.9(1.1)   105.9(1.1)   OUTPUT    Urine(mL/kg/hr) 47   47   Shift Total(mL/kg) 47(0.5)   47(0.5)   Weight (kg) 94 94 94 94           Medications:   sodium chloride 0.9%   Intravenous Once    albuterol-ipratropium  3 mL Nebulization Q6H WAKE    amiodarone  200 mg Oral BID    aspirin  81 mg Oral Daily    atorvastatin  40 mg Oral QHS    ceFAZolin (ANCEF) IVPB  2 g Intravenous Q12H    enoxaparin  30 mg Subcutaneous Daily    insulin detemir U-100  10 Units Subcutaneous BID    metoprolol tartrate  25 mg Oral BID    pantoprazole  40 mg Intravenous Daily    polyethylene glycol  17 g Per NG tube Daily    senna-docusate 8.6-50 mg  1 tablet Oral BID           Physical Exam  Vitals and nursing note reviewed.   Constitutional:       General: He is not in acute distress.     Appearance: He is ill-appearing. He is not diaphoretic.   HENT:      Head: Normocephalic and atraumatic.      Mouth/Throat:      Pharynx: No oropharyngeal exudate.   Eyes:      General: No scleral icterus.     Conjunctiva/sclera: Conjunctivae normal.      Pupils: Pupils are equal, round, and reactive to light.   Cardiovascular:      Rate and Rhythm: Tachycardia present. Rhythm irregular.      Heart sounds: Normal heart sounds. No murmur heard.  Pulmonary:      Effort: No respiratory distress.   Abdominal:      General: Bowel sounds are normal. There is no distension.      Palpations: Abdomen is soft.      Tenderness: There is no abdominal tenderness.   Musculoskeletal:         General: Swelling present.      Cervical back: Normal range of motion and neck supple.   Skin:     General: Skin is warm and dry.      Findings: No erythema.   Neurological:      General: No focal deficit present.      Mental Status: He is alert.      Cranial Nerves: No cranial nerve deficit.       Laboratory:  Recent Labs   Lab 11/12/22  0621 11/13/22  0448 11/14/22  0550   WBC 14.58* 15.07* 22.38*   HGB 13.4* 12.6* 13.1*   HCT 37.9* 35.2* 37.3*   * 131* 117*   MONO 5.3  0.8 10.4  1.6*  --      Recent Labs    Lab 11/08/22  0410 11/08/22  0825 11/09/22 2007 11/09/22  2352 11/11/22  0531 11/11/22  1248 11/13/22  1218 11/13/22  1752 11/14/22  0550   *  131*   < > 131*   < > 130*   < > 133* 133* 131*   K 4.3  4.1   < > 4.0   < > 4.5   < > 4.3 4.1 4.8   CL 99  99   < > 96   < > 95   < > 96 98 96   CO2 17*  19*   < > 18*   < > 19*   < > 15* 15* 15*   BUN 33*  34*   < > 62*   < > 87*   < > 80* 79* 96*   CREATININE 1.6*  1.6*   < > 3.0*   < > 4.9*   < > 5.7* 5.4* 6.5*   CALCIUM 8.9  8.9   < > 8.6*   < > 8.3*   < > 7.8* 7.6* 7.6*   PHOS 2.8  --  3.9  --  4.1  --   --   --   --     < > = values in this interval not displayed.       Diagnostic Results:  X-Ray: Reviewed  US: Reviewed  Echo: Reviewed  ASSESSMENT/PLAN:     1. NADIYA -  ?CKD DKA Sepsis Bacteremia Endocarditis on IV abx BP soft also A-fib   -- Will get exchange  line today resume RRT HD vs CRRT based on MAP need 60-65   Daily renal panel - check phos   2. Sepsis Endocarditis -  STAPHYLOCOCCUS AUREUS   -- Cefazolin 2g Q12  - if CRRT/HD not resumed change to 24hr CrCl <10 anuric     3. Anemia       Recent Labs   Lab 11/12/22  0621 11/13/22  0448 11/14/22  0550   HGB 13.4* 12.6* 13.1*   HCT 37.9* 35.2* 37.3*   * 131* 117*       Iron   No results found for: IRON, TIBC, FERRITIN, SATURATEDIRO    4. MBD (E88.9 M90.80) -  Recent Labs   Lab 11/11/22  0531 11/11/22  1248 11/14/22  0550   CALCIUM 8.3*   < > 7.6*   PHOS 4.1  --   --     < > = values in this interval not displayed.     Recent Labs   Lab 11/09/22  2007 11/10/22  0426 11/11/22  0531   MG 3.0* 2.9* 3.0*       Lab Results   Component Value Date    CALCIUM 7.6 (L) 11/14/2022    PHOS 4.1 11/11/2022     No results found for: [id number]  Acidosis AGMA   -- NADIYA   -- RRT   Lab Results   Component Value Date    CO2 15 (L) 11/14/2022       5. Hemodialysis Access (Z99.2 V45.11)- CVC - not working well need new one   6. Nutrition/Hypoalbuminemia (E88.09) -  TF   Recent Labs   Lab 11/07/22  1118  11/09/22 2007   ALBUMIN 3.2* 2.0*     Total critical care time 35 minutes: included management of organ failures related to critical illness, titration of continuous infusions, review of pertinent labs and imaging studies, discussion with ICU team         Thank you for the consult, will follow  With any question please call [phone]  Rene Villa MD    [facility]  LACI Walters MD, FACALETHA,   GERSON Arvizu MD,   MD LUISITO Randall MD E. V. Harmon, NP    [address]  [place], [address] [phone]

## 2022-11-14 NOTE — ASSESSMENT & PLAN NOTE
Patient with Paroxysmal (<7 days) atrial fibrillation which is controlled currently with Amiodarone. Patient is currently in sinus rhythm.[id number] Score: 2.  Anticoagulation indicated. Anticoagulation done with lovenox.  -transitioning amiodarone to p.o.

## 2022-11-14 NOTE — PROGRESS NOTES
[facility] Medicine  Progress Note    Patient Name: Jose Luis Rendon  MRN: [id number]  Patient Class: IP- Inpatient   Admission Date: 11/7/2022  Length of Stay: 7 days  Attending Physician: Jermain Jackson, *  Primary Care Provider: Deric Armstrong MD        Subjective:     Principal Problem:MSSA bacteremia  Acute Condition:  MSSA bacteremia        HPI:  67M with PMH of COPD, HTN, CVA, Bell's Palsy presents with c/o sob, chest pain, nausea, vomiting x 4 days. Pt first noticed chest pain after picking up a family member off the floor. He also has had multiple episodes of N/V with retching which has exacerbated symptoms. The pain radiates around right side to his back. Also has SOB which pt states is chronic but has progressively worsened recently. Denies fever, chills, palpitations, diaphoresis, abd pain, diarrhea, dysuria. No recent fall/trauma. Workup in ED remarkable for hyperglycemia, HAGMA, elevated beta hydroxy. CXR showing Subtle bibasilar interstitial opacities, no focal consolidation. No known history of CHF or DM. Patient will be admitted to hospital medicine service for further management.      Overview/Hospital Course:  No notes on file    Interval History:  Still volume up.  Dialyze again today following exchange of line.  May need CRRT.    Review of Systems   Unable to perform ROS: Intubated   Objective:     Vital Signs (Most Recent):  Temp: 98.2 °F (36.8 °C) (11/14/22 1512)  Pulse: 78 (11/14/22 1533)  Resp: 20 (11/14/22 1533)  BP: 133/63 (11/14/22 1533)  SpO2: 95 % (11/14/22 1533)   Vital Signs (24h Range):  Temp:  [97.5 °F (36.4 °C)-98.2 °F (36.8 °C)] 98.2 °F (36.8 °C)  Pulse:  [64-87] 78  Resp:  [14-27] 20  SpO2:  [89 %-98 %] 95 %  BP: ()/(48-80) 133/63     Weight: 94 kg (207 lb 3.7 oz)  Body mass index is 29.73 kg/m².    Intake/Output Summary (Last 24 hours) at 11/14/2022 1556  Last data filed at 11/14/2022 1402  Gross per 24 hour   Intake 1485.91 ml   Output -130 ml    Net 1615.91 ml        Physical Exam  Constitutional:       Appearance: He is ill-appearing. He is not toxic-appearing.      Interventions: He is sedated and intubated.   HENT:      Head: Normocephalic and atraumatic.      Mouth/Throat:      Mouth: Mucous membranes are moist.   Eyes:      Conjunctiva/sclera: Conjunctivae normal.      Pupils: Pupils are equal, round, and reactive to light.   Cardiovascular:      Rate and Rhythm: Normal rate and regular rhythm.      Heart sounds: Normal heart sounds.   Pulmonary:      Effort: Pulmonary effort is normal. He is intubated.      Breath sounds: Rales (bibasilar) present.   Abdominal:      General: There is no distension.      Palpations: Abdomen is soft.      Tenderness: There is no abdominal tenderness. There is no guarding.   Musculoskeletal:         General: Normal range of motion.      Cervical back: Normal range of motion and neck supple.      Right lower leg: Edema present.      Left lower leg: Edema present.   Skin:     General: Skin is warm and dry.   Neurological:      General: No focal deficit present.      Sensory: No sensory deficit.      Comments: Facial droop   Psychiatric:      Comments: Unable to assess       Significant Labs: All pertinent labs within the past 24 hours have been reviewed.    Significant Imaging: I have reviewed all pertinent imaging results/findings within the past 24 hours.      Assessment/Plan:      * MSSA bacteremia  - blood cultures growing staph (appears to be MSSA based off PCR) and strep  - repeat blood cultures for clearance  - admission TTE without vegetation; SANTA with vegetation  - on BSABx; de-escalated to cefazolin  - ID consult; will need 6 weeks IV antibiotics from date of culture clearance      Problem with dialysis access  -issue with line today   -ICU team to replaced today and will dialyze patient      Acute bacterial endocarditis  - SANTA with significant vegetations  - continue IV antibiotics  - serial cultures for  clearance  - anticipate 6 weeks of IV antibiotics from date of culture clearance with cefazolin  - ID following      Acute renal failure with tubular necrosis  Patient with acute kidney injury likely due to acute tubular necrosis NADIYA is currently worsening. Labs reviewed- Renal function/electrolytes with Estimated Creatinine Clearance: 12.3 mL/min (A) (based on SCr of 6.5 mg/dL (H)). according to latest data. Monitor urine output and serial BMP and adjust therapy as needed. Avoid nephrotoxins and renally dose meds for GFR listed above.   - likely due to septic shock/DKA  - BP support with pressors as needed  - IV lasix trial without improvement  - Nephrology consultation:  Initiated dialysis 11/12, well tolerated  - additional IV Lasix trial on 11/13 unsuccessful  -plan for dialysis today    Atrial fibrillation with RVR  Patient with Paroxysmal (<7 days) atrial fibrillation which is controlled currently with Amiodarone. Patient is currently in sinus rhythm.[id number] Score: 2.  Anticoagulation indicated. Anticoagulation done with lovenox.  -transitioning amiodarone to p.o.    Septic shock  - likely due to post-viral PNA with staph bacteremia and endocarditis  - likely distributive component due to sedation  - wean off levophed  - continue IV abx  - repeat BCx for clearance    Endotracheally intubated  - worsening respiratory failure  - intubated 11/9  - Pulm following      Bacterial pneumonia  - suspect due to post-viral PNA with staph  - COVID and flu negative on admission  - on BSABx for now given acute decompensation  - empiric tamiflu stopped   - staph bacteremia, switched antibiotics to cefazolin    Centrilobular emphysema  -noted on imaging  -needs PFTs as outpatient and pulmonology follow-up      COPD with acute exacerbation  As above-nightly BiPAP for in addition to prednisone, azithromycin  -decompensation prompting intubation  -stop steroids; on broad-spectrum antibiotics  -suspect may have staph  pneumonia    DKA (diabetic ketoacidosis)  Workup in ED remarkable for hyperglycemia, HAGMA, elevated beta hydroxy.   New diagnosis of DM  DKA protocol  -A1c greater than 14; will need insulin at time of discharge  -reopened gap; transitioned back to insulin gtt  - now with concern for hypoglycemia; possibly due to insulin stacking  -adjust to detemir 10 units b.i.d.    Acute hypoxemic respiratory failure  Likely 2/2 COPD  CXR showing subtle bibasilar interstitial opacities.  No focal consolidation  CT chest 10/2021: Centrilobular emphysema with an upper lobe predominance.  Bibasilar crackles on exam  Standing duonebs  Started on azithromycin and prednisone; fevers overnight, broadened antibiotics and repeating flu/RSV/COVID  -now with bacteremia  Supp O2 PRN to keep O2 sat 88-92%  TTE  Strict I&O  Hold IV fluids    Benign essential HTN  - hold amlodipine      VTE Risk Mitigation (From admission, onward)         Ordered     heparin (porcine) injection 2,400 Units  As needed (PRN)         11/12/22 1554     enoxaparin injection 30 mg  Daily         11/10/22 1155     IP VTE LOW RISK PATIENT  Once         11/07/22 1231     Place sequential compression device  Until discontinued         11/07/22 1231                Discharge Planning   BELINDA:      Code Status: Full Code   Is the patient medically ready for discharge?:     Reason for patient still in hospital (select all that apply): Patient unstable  Discharge Plan A: Rehab   Discharge Delays: None known at this time        Critical care time spent on the evaluation and treatment of severe organ dysfunction, review of pertinent labs and imaging studies, discussions with consulting providers and discussions with patient/family: 35 minutes.      Jermain Jackson MD  Department of Hospital Medicine   [place] - Intensive Care

## 2022-11-14 NOTE — NURSING
Patient PIV was infiltrated, the one that had the Precedex infusing. Patient large BM was noted and he was cleaned, is PIV was d.c'd  and the line was changed to another side. Noted, not too long after the Blood pressure started dropping. Drip was titrated accordingly and later than pause due to pressure issues. NS Bolus started to recovery blood pressures.

## 2022-11-14 NOTE — SUBJECTIVE & OBJECTIVE
Interval History/Significant Events:   No acute overnight event  Patient remains off pressor  Dialysis staff only able to remove 200 cc of fluid yesterday due to access issue  We plan to place a new dialysis catheter today    Review of Systems Unable to obtain  Objective:     Vital Signs (Most Recent):  Temp: 98.2 °F (36.8 °C) (11/14/22 1108)  Pulse: 71 (11/14/22 1120)  Resp: 20 (11/14/22 1120)  BP: (!) 120/56 (11/14/22 1108)  SpO2: (!) 94 % (11/14/22 1120)   Vital Signs (24h Range):  Temp:  [97.5 °F (36.4 °C)-98.2 °F (36.8 °C)] 98.2 °F (36.8 °C)  Pulse:  [64-87] 71  Resp:  [14-27] 20  SpO2:  [89 %-98 %] 94 %  BP: ()/(48-80) 120/56   Weight: 94 kg (207 lb 3.7 oz)  Body mass index is 29.73 kg/m².      Intake/Output Summary (Last 24 hours) at 11/14/2022 1200  Last data filed at 11/14/2022 1159  Gross per 24 hour   Intake 1335.91 ml   Output -130 ml   Net 1465.91 ml       Physical Exam  Constitutional:       Appearance: He is ill-appearing. He is not toxic-appearing.      Interventions: He is sedated and intubated.   HENT:      Head: Normocephalic and atraumatic.      Mouth/Throat:      Mouth: Mucous membranes are moist.   Eyes:      Conjunctiva/sclera: Conjunctivae normal.      Pupils: Pupils are equal, round, and reactive to light.   Cardiovascular:      Rate and Rhythm: Normal rate and regular rhythm.      Heart sounds: Normal heart sounds.   Pulmonary:      Effort: Pulmonary effort is normal. He is intubated.      Breath sounds: Rales (bibasilar) present.   Abdominal:      General: There is no distension.      Palpations: Abdomen is soft.      Tenderness: There is no abdominal tenderness. There is no guarding.   Musculoskeletal:         General: Normal range of motion.      Cervical back: Normal range of motion and neck supple.      Right lower leg: Edema present.      Left lower leg: Edema present.   Skin:     General: Skin is warm and dry.   Neurological:      General: No focal deficit present.       Sensory: No sensory deficit.      Comments: Facial droop   Psychiatric:      Comments: Unable to assess     Vents:  Vent Mode: A/CMV-PC (11/14/22 1120)  Set Rate: 16 BPM (11/14/22 1120)  Vt Set: 470 mL (11/13/22 0929)  PEEP/CPAP: 7 cmH20 (11/14/22 1120)  Oxygen Concentration (%): 60 (11/14/22 1120)  Peak Airway Pressure: 30.2 cmH2O (11/14/22 1120)  Plateau Pressure: 19.1 cmH20 (11/14/22 0538)  Total Ve: 10.8 L/m (11/14/22 1120)  F/VT Ratio<105 (RSBI): (!) 36.97 (11/14/22 1120)  Lines/Drains/Airways       Central Venous Catheter Line  Duration             Trialysis (Dialysis) Catheter 11/12/22 1324 right internal jugular 1 day              Drain  Duration                  NG/OG Tube 11/09/22 0805 orogastric 16 Fr. Right mouth 5 days         Urethral Catheter 11/09/22 0815 Non-latex 14 Fr. 5 days         Rectal Tube 11/14/22 1158 rectal tube w/ balloon (indicate number of mLs) <1 day              Airway  Duration                  Airway - Non-Surgical 11/09/22 0804 Endotracheal Tube 5 days              Peripheral Intravenous Line  Duration                  Peripheral IV - Single Lumen 11/08/22 2050 20 G Distal;Left;Posterior Forearm 5 days         Peripheral IV - Single Lumen 11/09/22 0900 18 G Anterior;Distal;Left Upper Arm 5 days         Peripheral IV - Single Lumen 11/10/22 0044 18 G Right Forearm 4 days                  Significant Labs:    CBC/Anemia Profile:  Recent Labs   Lab 11/13/22  0448 11/14/22  0550   WBC 15.07* 22.38*   HGB 12.6* 13.1*   HCT 35.2* 37.3*   * 117*   MCV 87 87   RDW 13.4 13.8        Chemistries:  Recent Labs   Lab 11/13/22  1218 11/13/22  1752 11/14/22  0550   * 133* 131*   K 4.3 4.1 4.8   CL 96 98 96   CO2 15* 15* 15*   BUN 80* 79* 96*   CREATININE 5.7* 5.4* 6.5*   CALCIUM 7.8* 7.6* 7.6*       All pertinent labs within the past 24 hours have been reviewed.    Significant Imaging:  I have reviewed all pertinent imaging results/findings within the past 24 hours.  I have reviewed  and interpreted all pertinent imaging results/findings within the past 24 hours.

## 2022-11-14 NOTE — PLAN OF CARE
The pt remains intubated in the ICU. The pt's dispo is still pending,he still meets criteria for ICU care. The sw met with the pt's son Jose Luis Mendes At bedside to answer questions and offer emotional support for the death of his brother yesterday. He states they haven't told the pt yet but does want the pt to find out while he's in the hospital. He states they will inform the pt once he's stable,before he's discharged. The sw encouraged him to call if he requires any further assistance. He thanked the sw for her support.        11/14/22 7717   Discharge Reassessment   Assessment Type Discharge Planning Reassessment   Did the patient's condition or plan change since previous assessment? No   Discharge Plan discussed with: Adult children   Name(s) and Number(s) Jose Luis Rendon Jr.(son)[phone]   Communicated BELINDA with patient/caregiver Date not available/Unable to determine   Discharge Plan A Rehab   Discharge Plan B Skilled Nursing Facility   DME Needed Upon Discharge  other (see comments)  (TBD)   Discharge Barriers Identified None   Why the patient remains in the hospital Requires continued medical care   Post-Acute Status   Discharge Delays None known at this time

## 2022-11-14 NOTE — NURSING
Spoke with Dr. JANEY Arvizu,Nephrologist. Suggested placing a new line and attempt to dialyze again today.

## 2022-11-14 NOTE — NURSING
eICU was called the phone continued to rang, Bolus given to patient and placed in a trendelenburg position. Charge Nurse made aware of what the patient status was during the entire event

## 2022-11-14 NOTE — SUBJECTIVE & OBJECTIVE
Interval History:  Still volume up.  Dialyze again today following exchange of line.  May need CRRT.    Review of Systems   Unable to perform ROS: Intubated   Objective:     Vital Signs (Most Recent):  Temp: 98.2 °F (36.8 °C) (11/14/22 1512)  Pulse: 78 (11/14/22 1533)  Resp: 20 (11/14/22 1533)  BP: 133/63 (11/14/22 1533)  SpO2: 95 % (11/14/22 1533)   Vital Signs (24h Range):  Temp:  [97.5 °F (36.4 °C)-98.2 °F (36.8 °C)] 98.2 °F (36.8 °C)  Pulse:  [64-87] 78  Resp:  [14-27] 20  SpO2:  [89 %-98 %] 95 %  BP: ()/(48-80) 133/63     Weight: 94 kg (207 lb 3.7 oz)  Body mass index is 29.73 kg/m².    Intake/Output Summary (Last 24 hours) at 11/14/2022 1556  Last data filed at 11/14/2022 1402  Gross per 24 hour   Intake 1485.91 ml   Output -130 ml   Net 1615.91 ml        Physical Exam  Constitutional:       Appearance: He is ill-appearing. He is not toxic-appearing.      Interventions: He is sedated and intubated.   HENT:      Head: Normocephalic and atraumatic.      Mouth/Throat:      Mouth: Mucous membranes are moist.   Eyes:      Conjunctiva/sclera: Conjunctivae normal.      Pupils: Pupils are equal, round, and reactive to light.   Cardiovascular:      Rate and Rhythm: Normal rate and regular rhythm.      Heart sounds: Normal heart sounds.   Pulmonary:      Effort: Pulmonary effort is normal. He is intubated.      Breath sounds: Rales (bibasilar) present.   Abdominal:      General: There is no distension.      Palpations: Abdomen is soft.      Tenderness: There is no abdominal tenderness. There is no guarding.   Musculoskeletal:         General: Normal range of motion.      Cervical back: Normal range of motion and neck supple.      Right lower leg: Edema present.      Left lower leg: Edema present.   Skin:     General: Skin is warm and dry.   Neurological:      General: No focal deficit present.      Sensory: No sensory deficit.      Comments: Facial droop   Psychiatric:      Comments: Unable to assess        Significant Labs: All pertinent labs within the past 24 hours have been reviewed.    Significant Imaging: I have reviewed all pertinent imaging results/findings within the past 24 hours.

## 2022-11-15 PROBLEM — I33.0 INFECTIVE ENDOCARDITIS OF AORTIC VALVE: Status: ACTIVE | Noted: 2022-01-01

## 2022-11-15 PROBLEM — R78.81 BACTEREMIA: Status: ACTIVE | Noted: 2022-01-01

## 2022-11-15 PROBLEM — B95.1 BACTEREMIA DUE TO GROUP B STREPTOCOCCUS: Status: ACTIVE | Noted: 2022-01-01

## 2022-11-15 NOTE — EICU
Melody received for R elbow I&D w/ Dr VALENTINO Harris. Time out performed prior to incision @ 1308. Purulent drainage noted from wound and irrigated. Wound left open and packed w/ iodoform gauze. Aerobic and anaerobic cultures collected and to be sent for testing. Vitals monitored and stable throughout procedure w/ no adverse events noted.

## 2022-11-15 NOTE — ASSESSMENT & PLAN NOTE
Patient with Paroxysmal (<7 days) atrial fibrillation which is controlled currently with Amiodarone. Patient is currently in sinus rhythm.[id number] Score: 2.  Anticoagulation indicated. Anticoagulation done with lovenox.  -transitioning amiodarone to p.o.

## 2022-11-15 NOTE — NURSING
eICU called and asked for Chest XRAY, due to not hearing breath sounds   on the  right side and dark matter coming up from OG tube.

## 2022-11-15 NOTE — SUBJECTIVE & OBJECTIVE
Interval History:  Remains intubated on CRRT. Noted to have R elbow abscess.     Review of Systems   Unable to perform ROS: Intubated   Objective:     Vital Signs (Most Recent):  Temp: 97.6 °F (36.4 °C) (11/15/22 1515)  Pulse: 86 (11/15/22 1522)  Resp: 17 (11/15/22 1522)  BP: (!) 96/50 (11/15/22 1515)  SpO2: 97 % (11/15/22 1522)   Vital Signs (24h Range):  Temp:  [97 °F (36.1 °C)-98.7 °F (37.1 °C)] 97.6 °F (36.4 °C)  Pulse:  [56-89] 86  Resp:  [9-28] 17  SpO2:  [92 %-100 %] 97 %  BP: ()/(36-60) 96/50     Weight: 100 kg (220 lb 7.4 oz)  Body mass index is 31.63 kg/m².    Intake/Output Summary (Last 24 hours) at 11/15/2022 1704  Last data filed at 11/15/2022 1654  Gross per 24 hour   Intake 1738.48 ml   Output 2591.32 ml   Net -852.84 ml        Physical Exam  Constitutional:       Appearance: He is ill-appearing. He is not toxic-appearing.      Interventions: He is sedated and intubated.   HENT:      Head: Normocephalic and atraumatic.      Mouth/Throat:      Mouth: Mucous membranes are moist.   Eyes:      Conjunctiva/sclera: Conjunctivae normal.      Pupils: Pupils are equal, round, and reactive to light.   Cardiovascular:      Rate and Rhythm: Normal rate and regular rhythm.      Heart sounds: Normal heart sounds.   Pulmonary:      Effort: Pulmonary effort is normal. He is intubated.      Breath sounds: Rales (bibasilar) present.   Abdominal:      General: There is no distension.      Palpations: Abdomen is soft.      Tenderness: There is no abdominal tenderness. There is no guarding.   Musculoskeletal:         General: Normal range of motion.      Cervical back: Normal range of motion and neck supple.      Right lower leg: Edema present.      Left lower leg: Edema present.   Skin:     General: Skin is warm and dry.   Neurological:      General: No focal deficit present.      Sensory: No sensory deficit.      Comments: Facial droop   Psychiatric:      Comments: Unable to assess       Significant Labs: All  pertinent labs within the past 24 hours have been reviewed.    Significant Imaging: I have reviewed all pertinent imaging results/findings within the past 24 hours.

## 2022-11-15 NOTE — PROGRESS NOTES
Pt on mechanical vent for respiratory distress.   On precedex gtt for sedation, having some difficulty with the vent.     Adding Ativan 2mg IV q2h PRN for sedation optimization.   Can consider fentanyl gtt if pain is the issue.     D/w bedside RN.     Follow up note:  - RT reporting that pt is not receiving good Vt on the current PC AC setting     ABG now is 7.27/45/114  Will change to AC /26/+7/50% for now.   Also additional sedation w/ Ativan IV now.     Follow up note:  - RN reporting ?fecal material draining from the NGT also.   + chest sounds very congested.     Agree w/ D/C IVF for now  NPO  F/up UOP.     CXR and XR KUB to evaluate for SBO also.   Aspiration precautions     Adding B/L UE soft wrist restraints order renewal for 24hrs.   D/w bedside RN.

## 2022-11-15 NOTE — PLAN OF CARE
Care plan reviewed. Pt remains on precedex currently at 0.4mcg/kg/hr. adjusted dose at 1709 to maintain rass at -2 as ordered. Rectal tube placed today due to liquid stools. Little urine output this shift. Pt unable to have HD today, Dr. Villa placed orders for CRRT. Dr. Cazares at the bedside changing trialysis site due to complications with current one.

## 2022-11-15 NOTE — PLAN OF CARE
Pt remains on ventilator. Setting adjusted as per RT closely monitoring. Pulm team at the bedside with RT readjusted vent/ngt. Abg's monitored. Pt weaned off precedex and placed on propofol, remains on levo and fentanyl, all titrated as needed. Crrt restarted this morning. Pt seen by , I&D done at bedside, tolerated well. Upon rounds, noted site saturated will blood, contacted Dr. Harris, instructed to remove old dressing but not packing and apply abd pads and wrap with ace bandage. Dressing changed.  Pt's became afib RVR, contacted Dr. Cazares, new orders in place. 12 lead ekg done. Amiodarone administered, pt converted back.

## 2022-11-15 NOTE — NURSING
Patient son Jose Luis came into the room to get his personal belongings. He informed me that he was leaving for the evening and if needed, he would have his phone on.

## 2022-11-15 NOTE — NURSING
Many alarms noted, CRRT machine needed to be restarted with another machine, while current run.  Once second machine was ready, patient received a planned rinse back. All patient blood returned without any complications noted.

## 2022-11-15 NOTE — PROCEDURES
"Jose Luis Rendon is a 67 y.o. male patient.    Temp: 98.2 °F (36.8 °C) (22 151)  Pulse: 75 (22)  Resp: 16 (22)  BP: (!) 91/52 (22 1830)  SpO2: 95 % (22)  Weight: 94 kg (207 lb 3.7 oz) (22 0600)  Height: 5' 10" (177.8 cm) (22 1317)       Incision and Drainage    Date/Time: 2022 7:05 PM  Location procedure was performed: [facility] ICU  Performed by: Naty Cazares MD  Authorized by: Cooper Mishra MD   Pre-operative diagnosis: Right elbow abscess  Post-operative diagnosis: Right elbow abscess  Consent Done: Yes  Consent: Written consent obtained.  Risks and benefits: risks, benefits and alternatives were discussed  Patient understanding: patient states understanding of the procedure being performed  Patient consent: the patient's understanding of the procedure matches consent given  Procedure consent: procedure consent matches procedure scheduled  Relevant documents: relevant documents present and verified  Test results: test results available and properly labeled  Site marked: the operative site was marked  Imaging studies: imaging studies available  Patient identity confirmed: , MRN, name, provided demographic data and verbally with patient  Time out: Immediately prior to procedure a "time out" was called to verify the correct patient, procedure, equipment, support staff and site/side marked as required.  Type: abscess  Body area: upper extremity  Location details: right elbow  Anesthesia: see MAR for details    Patient sedated: yes  Sedatives: see MAR for details  Analgesia: see MAR for details  Drainage: pus (3 cc of pus aspirated)  Estimated blood loss (mL): 0  Patient tolerance: Patient tolerated the procedure well with no immediate complications  Comments: Patient will need surgical drainage        2022    "

## 2022-11-15 NOTE — NURSING
New CRRT machine started and running currently with lower Arterial pressures. Pre-filter Saline resumed, will minus from I/O's

## 2022-11-15 NOTE — PROGRESS NOTES
CRRT, CVVH started and running thru new Subclavian Line Patient is tolerated well at a   see flow record

## 2022-11-15 NOTE — NURSING
Patient CRRT machine continue to alarm, he is readjusted  with pillows, Precedex is at MAX, he continue  to cause the vent to alarm alonw with CRRT machine. eICU called for suggestion. New orders noted.

## 2022-11-15 NOTE — NURSING
CRRT machine continue to alarm, EICU called for more sedation. Lactic acid collected, resp called for Stat ABG. Will call MD Villa and inform him on the complications that we are having with the machine and the LINE.

## 2022-11-15 NOTE — CONSULTS
[facility] Consult Note     Chief Complaint:  right elbow abscess     HPI:  This patient is a 66yo M hx of HTN, bells palsy, CVA, who was admitted on 11/7/22 due to respiratory distress, found to be in heart failure with fluid overload. Labs were significant for diabetic ketoacidosis. On imaging, an aortic vegitation was discovered, and blood cultures have grown MSSA. Orthopedic surgery was consulted on 11/15 after a right elbow bursa abscess was observed on exam with purulence on aspiration.      PMH:    Past Medical History:   Diagnosis Date    Anxiety 8/5/2016    Bell's palsy     Benign essential HTN 4/27/2018    Lacunar infarction     Remote finding seen on cranial imaging during acute workup for Bell's palsy/Ivania White    Pneumonia     age 5    Psoriasis      PSH:    Past Surgical History:   Procedure Laterality Date    BACK SURGERY N/A 1998    bulging L5    TONSILLECTOMY       FH:  Noncontributory  SH: smoker, no history of illicit drug use    Meds:    No current facility-administered medications on file prior to encounter.     Current Outpatient Medications on File Prior to Encounter   Medication Sig Dispense Refill    amLODIPine (NORVASC) 5 MG tablet Take 1 tablet (5 mg total) by mouth once daily. 90 tablet 3    aspirin (ECOTRIN) 81 MG EC tablet Take 81 mg by mouth.      atorvastatin (LIPITOR) 40 MG tablet TAKE 1 TABLET(40 MG) BY MOUTH EVERY DAY (Patient taking differently: Take 40 mg by mouth once daily.) 90 tablet 3    EScitalopram oxalate (LEXAPRO) 10 MG tablet TAKE 1 TABLET(10 MG) BY MOUTH EVERY DAY (Patient taking differently: Take 10 mg by mouth once daily.) 90 tablet 0       Allergies:    Review of patient's allergies indicates:   Allergen Reactions    Bee sting [allergen ext-venom-honey bee] Anaphylaxis and Swelling    Venom-yellow jacket Swelling        ROS:  otherwise negative except indicated in HPI      Exam:  Vitals:  BP (!) 98/53   Pulse 85   Temp 98.3 °F (36.8 °C) (Oral)   Resp 16   Ht 5'  "10" (1.778 m)   Wt 100 kg (220 lb 7.4 oz)   SpO2 96%   BMI 31.63 kg/m²   Gen:  Awake and alert, NAD  Resp: No increased WOB  Cards: RRR by PP  Abd:  Non-distended, benign    RUE:  Right elbow erythematous, with fluctuance in olecranon bursa   Unable to participate in exam due to sedation  Palpable radial pulse     Labs:  Recent Labs   Lab 11/09/22 2007 11/09/22  2352 11/13/22  0448 11/13/22  1218 11/14/22  0550 11/14/22  2211 11/15/22  0415 11/15/22  0919 11/15/22  1124   WBC  --    < > 15.07*  --  22.38*  --  23.26*  --   --    HGB  --    < > 12.6*  --  13.1*  --  12.2*  --   --    HCT  --    < > 35.2*  --  37.3*  --  34.7*  --   --    PLT  --    < > 131*  --  117*  --  120*  --   --    MCV  --    < > 87  --  87  --  87  --   --    RDW  --    < > 13.4  --  13.8  --  14.2  --   --    *   < > 130*   < > 131*   < > 133* 131* 132*   K 4.0   < > 4.6   < > 4.8   < > 5.1 5.4* 5.3*   CL 96   < > 97   < > 96   < > 99 98 99   CO2 18*   < > 21*   < > 15*   < > 15* 14* 14*   BUN 62*   < > 75*   < > 96*   < > 89* 97* 93*   CREATININE 3.0*   < > 5.4*   < > 6.5*   < > 6.1* 6.5* 6.3*   *  545*   < > 126*   < > 120*   < > 156* 194* 198*   PROT 5.9*  --   --   --   --   --   --   --   --    ALBUMIN 2.0*  --   --   --   --    < > 1.6* 1.7* 1.7*   BILITOT 1.2*  --   --   --   --   --   --   --   --    AST 59*  --   --   --   --   --   --   --   --    ALKPHOS 131  --   --   --   --   --   --   --   --    ALT 58*  --   --   --   --   --   --   --   --     < > = values in this interval not displayed.       Imaging:  XR right elbow demonstrates no fractures, normal alignment of radiocapitellar and ulnohumeral joints    Assessment/Plan:  66yo M with MSSA bacteremia, admitted to ICU for sepsis/respiratory compromise, renal failure, now with right olecranon abscess/bursitis. Status post bedside I&D    -I&D performed at bedside, wound packed with iodoform gauze, informed consent obtained via phone consent from patients " son  -follow abscess cultures, tailor abx coverage accordingly, suspect bactermia seeding bursa, per son has a history of psoriatic arthritis and plaques over the elbows  -recommend multimodal Pain control  -our team will follow, please call with questions or concerns  -rest of care per ICU     Dispo:  per primary    Stu Harris MD

## 2022-11-15 NOTE — PLAN OF CARE
Recommendation:  1. Now that propofol d/c, consider changing TF formula to Glucerna 1.5 to better meet pt needs. Initiate Glucerna 1.5 at 10ml/hr and advance as tolerated to goal rate of 55ml/hr. 2. Monitor weight/labs.   3. RD to continue to follow to monitor TF tolerance.    Goals:  TF to meet at least 85% EEN/EPN by RD follow up  Nutrition Goal Status: new

## 2022-11-15 NOTE — PROGRESS NOTES
[facility] Medicine  Progress Note    Patient Name: Jose Luis Rendon  MRN: [id number]  Patient Class: IP- Inpatient   Admission Date: 11/7/2022  Length of Stay: 8 days  Attending Physician: Martin Grady MD  Primary Care Provider: Deric Armstrong MD        Subjective:     Principal Problem:MSSA bacteremia        HPI:  67M with PMH of COPD, HTN, CVA, Bell's Palsy presents with c/o sob, chest pain, nausea, vomiting x 4 days. Pt first noticed chest pain after picking up a family member off the floor. He also has had multiple episodes of N/V with retching which has exacerbated symptoms. The pain radiates around right side to his back. Also has SOB which pt states is chronic but has progressively worsened recently. Denies fever, chills, palpitations, diaphoresis, abd pain, diarrhea, dysuria. No recent fall/trauma. Workup in ED remarkable for hyperglycemia, HAGMA, elevated beta hydroxy. CXR showing Subtle bibasilar interstitial opacities, no focal consolidation. No known history of CHF or DM. Patient will be admitted to hospital medicine service for further management.      Overview/Hospital Course:  No notes on file    Interval History:  Remains intubated on CRRT. Noted to have R elbow abscess.     Review of Systems   Unable to perform ROS: Intubated   Objective:     Vital Signs (Most Recent):  Temp: 97.6 °F (36.4 °C) (11/15/22 1515)  Pulse: 86 (11/15/22 1522)  Resp: 17 (11/15/22 1522)  BP: (!) 96/50 (11/15/22 1515)  SpO2: 97 % (11/15/22 1522)   Vital Signs (24h Range):  Temp:  [97 °F (36.1 °C)-98.7 °F (37.1 °C)] 97.6 °F (36.4 °C)  Pulse:  [56-89] 86  Resp:  [9-28] 17  SpO2:  [92 %-100 %] 97 %  BP: ()/(36-60) 96/50     Weight: 100 kg (220 lb 7.4 oz)  Body mass index is 31.63 kg/m².    Intake/Output Summary (Last 24 hours) at 11/15/2022 1704  Last data filed at 11/15/2022 1654  Gross per 24 hour   Intake 1738.48 ml   Output 2591.32 ml   Net -852.84 ml        Physical Exam  Constitutional:        Appearance: He is ill-appearing. He is not toxic-appearing.      Interventions: He is sedated and intubated.   HENT:      Head: Normocephalic and atraumatic.      Mouth/Throat:      Mouth: Mucous membranes are moist.   Eyes:      Conjunctiva/sclera: Conjunctivae normal.      Pupils: Pupils are equal, round, and reactive to light.   Cardiovascular:      Rate and Rhythm: Normal rate and regular rhythm.      Heart sounds: Normal heart sounds.   Pulmonary:      Effort: Pulmonary effort is normal. He is intubated.      Breath sounds: Rales (bibasilar) present.   Abdominal:      General: There is no distension.      Palpations: Abdomen is soft.      Tenderness: There is no abdominal tenderness. There is no guarding.   Musculoskeletal:         General: Normal range of motion.      Cervical back: Normal range of motion and neck supple.      Right lower leg: Edema present.      Left lower leg: Edema present.   Skin:     General: Skin is warm and dry.   Neurological:      General: No focal deficit present.      Sensory: No sensory deficit.      Comments: Facial droop   Psychiatric:      Comments: Unable to assess       Significant Labs: All pertinent labs within the past 24 hours have been reviewed.    Significant Imaging: I have reviewed all pertinent imaging results/findings within the past 24 hours.      Assessment/Plan:      * MSSA bacteremia  - blood cultures growing staph (appears to be MSSA based off PCR) and strep  - repeat blood cultures for clearance  - admission TTE without vegetation; SANTA with vegetation  - on BSABx; de-escalated to cefazolin  - ID consult; will need 6 weeks IV antibiotics from date of culture clearance      Bacteremia        Infective endocarditis of aortic valve        Bacteremia due to group B Streptococcus        Abscess of bursa of right elbow  Ortho consulted for I&D      Problem with dialysis access  -issue with line today   -ICU team to replaced today and will dialyze patient      Acute  bacterial endocarditis  - SANTA with significant vegetations  - continue IV antibiotics  - serial cultures for clearance  - anticipate 6 weeks of IV antibiotics from date of culture clearance with cefazolin  - ID following      Chest pain of uncertain etiology        Acute renal failure with tubular necrosis  Patient with acute kidney injury likely due to acute tubular necrosis NADIYA is currently worsening. Labs reviewed- Renal function/electrolytes with Estimated Creatinine Clearance: 13.1 mL/min (A) (based on SCr of 6.3 mg/dL (H)). according to latest data. Monitor urine output and serial BMP and adjust therapy as needed. Avoid nephrotoxins and renally dose meds for GFR listed above.   - likely due to septic shock/DKA  - BP support with pressors as needed  - IV lasix trial without improvement  - Nephrology consultation:  Initiated dialysis 11/12, well tolerated  - additional IV Lasix trial on 11/13 unsuccessful  - CRRT    Atrial fibrillation with RVR  Patient with Paroxysmal (<7 days) atrial fibrillation which is controlled currently with Amiodarone. Patient is currently in sinus rhythm.[id number] Score: 2.  Anticoagulation indicated. Anticoagulation done with lovenox.  -transitioning amiodarone to p.o.    Septic shock  - likely due to post-viral PNA with staph bacteremia and endocarditis  - likely distributive component due to sedation  - wean off levophed  - continue IV abx  - repeat BCx for clearance    Endotracheally intubated  - worsening respiratory failure  - intubated 11/9  - Pulm following      Bacterial pneumonia  - suspect due to post-viral PNA with staph  - COVID and flu negative on admission  - on BSABx for now given acute decompensation  - empiric tamiflu stopped   - staph bacteremia, switched antibiotics to cefazolin    Centrilobular emphysema  -noted on imaging  -needs PFTs as outpatient and pulmonology follow-up      COPD with acute exacerbation  As above-nightly BiPAP for in addition to prednisone,  azithromycin  -decompensation prompting intubation  -stop steroids; on broad-spectrum antibiotics  -suspect may have staph pneumonia    DKA (diabetic ketoacidosis)  Workup in ED remarkable for hyperglycemia, HAGMA, elevated beta hydroxy.   New diagnosis of DM  DKA protocol  -A1c greater than 14; will need insulin at time of discharge  -reopened gap; transitioned back to insulin gtt  - now with concern for hypoglycemia; possibly due to insulin stacking  -adjust to detemir 10 units b.i.d.    Acute hypoxemic respiratory failure  Likely 2/2 COPD  CXR showing subtle bibasilar interstitial opacities.  No focal consolidation  CT chest 10/2021: Centrilobular emphysema with an upper lobe predominance.  Bibasilar crackles on exam  Standing duonebs  Started on azithromycin and prednisone; fevers overnight, broadened antibiotics and repeating flu/RSV/COVID  -now with bacteremia  Supp O2 PRN to keep O2 sat 88-92%  TTE  Strict I&O  Fluid removal with CRRT     Benign essential HTN  - hold amlodipine      VTE Risk Mitigation (From admission, onward)         Ordered     heparin (porcine) injection 2,400 Units  As needed (PRN)         11/12/22 1554     enoxaparin injection 30 mg  Daily         11/10/22 1155     IP VTE LOW RISK PATIENT  Once         11/07/22 1231     Place sequential compression device  Until discontinued         11/07/22 1231                Discharge Planning   BELINDA:      Code Status: Full Code   Is the patient medically ready for discharge?:     Reason for patient still in hospital (select all that apply): Patient unstable, Treatment and Consult recommendations  Discharge Plan A: Rehab   Discharge Delays: None known at this time        Critical care time spent on the evaluation and treatment of severe organ dysfunction, review of pertinent labs and imaging studies, discussions with consulting providers and discussions with patient/family: 45 minutes.      Martin Grady MD  Department of Hospital Medicine   [place] -  Intensive Care

## 2022-11-15 NOTE — PROGRESS NOTES
Nephrology Progress Note     Consult Requested By: Martin Grady MD  Reason for Consult: NADIYA     SUBJECTIVE:        ?    Review of Systems   Unable to perform ROS: Acuity of condition     Past Medical History:   Diagnosis Date    Anxiety 8/5/2016    Bell's palsy     Benign essential HTN 4/27/2018    Lacunar infarction     Remote finding seen on cranial imaging during acute workup for Bell's palsy/[place]ia White    Pneumonia     age 5    Psoriasis        OBJECTIVE:     Vital Signs (Most Recent)  Vitals:    11/15/22 0730 11/15/22 0747 11/15/22 0818 11/15/22 0903   BP: (!) 92/51 (!) 95/49 (!) 99/54 (!) 103/55   BP Location: Left arm      Patient Position: Lying      Pulse: 82 86 85 85   Resp: (!) 26 16 14 15   Temp: 98.7 °F (37.1 °C)      TempSrc: Oral      SpO2: 98% 99% (!) 93% (!) 93%   Weight:       Height:             Date 11/15/22 0700 - 11/16/22 0659   Shift 3052-9768 1369-1066 0823-6060 24 Hour Total   INTAKE   I.V.(mL/kg) 96.7(1)   96.7(1)   Shift Total(mL/kg) 96.7(1)   96.7(1)   OUTPUT   Shift Total(mL/kg)       Weight (kg) 100 100 100 100             Medications:   albuterol-ipratropium  3 mL Nebulization Q4H    amiodarone  200 mg Oral BID    aspirin  81 mg Oral Daily    atorvastatin  40 mg Oral QHS    ceFAZolin (ANCEF) IVPB  1 g Intravenous Q24H    enoxaparin  30 mg Subcutaneous Daily    insulin detemir U-100  10 Units Subcutaneous BID    metoprolol tartrate  25 mg Oral BID    pantoprazole  40 mg Intravenous Daily    polyethylene glycol  17 g Per NG tube Daily    senna-docusate 8.6-50 mg  1 tablet Oral BID           Physical Exam  Vitals and nursing note reviewed.   Constitutional:       General: He is not in acute distress.     Appearance: He is ill-appearing. He is not diaphoretic.   HENT:      Head: Normocephalic and atraumatic.      Mouth/Throat:      Pharynx: No oropharyngeal exudate.   Eyes:      General: No scleral icterus.     Conjunctiva/sclera: Conjunctivae normal.   Cardiovascular:      Rate and  Rhythm: Tachycardia present. Rhythm irregular.      Heart sounds: Normal heart sounds. No murmur heard.  Pulmonary:      Effort: No respiratory distress.      Comments: Intubated   Abdominal:      General: There is no distension.      Palpations: Abdomen is soft.      Tenderness: There is no abdominal tenderness.   Musculoskeletal:         General: Swelling present.      Cervical back: Normal range of motion and neck supple.      Right lower leg: Edema present.      Left lower leg: Edema present.   Skin:     General: Skin is warm and dry.      Findings: No erythema.   Neurological:      General: No focal deficit present.      Cranial Nerves: No cranial nerve deficit.       Laboratory:  Recent Labs   Lab 11/13/22  0448 11/14/22  0550 11/15/22  0415   WBC 15.07* 22.38* 23.26*   HGB 12.6* 13.1* 12.2*   HCT 35.2* 37.3* 34.7*   * 117* 120*   MONO 10.4  1.6* 2.0* 4.9  1.2*       Recent Labs   Lab 11/11/22  0531 11/11/22  1248 11/14/22  0550 11/14/22  2211 11/15/22  0415   *   < > 131* 132* 133*   K 4.5   < > 4.8 4.6 5.1   CL 95   < > 96 98 99   CO2 19*   < > 15* 19* 15*   BUN 87*   < > 96* 94* 89*   CREATININE 4.9*   < > 6.5* 6.0* 6.1*   CALCIUM 8.3*   < > 7.6* 7.8* 7.7*   PHOS 4.1  --   --  6.6* 7.8*    < > = values in this interval not displayed.         Diagnostic Results:  X-Ray: Reviewed  US: Reviewed  Echo: Reviewed  ASSESSMENT/PLAN:     1. NADIYA -  ?CKD DKA Sepsis Bacteremia Endocarditis on IV abx BP soft also A-fib   -- CVC didn't work well was  exchanged 11/14 still issues with   line over the night   TPA was placed CRRT stopped   -- Will  resume  CRRT today with UF if able  MAP need 60-65 will use NS pre filter      2. Sepsis Endocarditis -  STAPHYLOCOCCUS AUREUS   -- Cefazolin 2g Q12  - while on CRRT     3. Anemia       Recent Labs   Lab 11/13/22  0448 11/14/22  0550 11/15/22  0415   HGB 12.6* 13.1* 12.2*   HCT 35.2* 37.3* 34.7*   * 117* 120*         Iron   No results found for: IRON, TIBC,  FERRITIN, SATURATEDIRO    4. MBD (E88.9 M90.80) -  Recent Labs   Lab 11/15/22  0415   CALCIUM 7.7*   PHOS 7.8*       Recent Labs   Lab 11/11/22  0531 11/15/22  0108 11/15/22  0919   MG 3.0* 2.6 2.8*         Lab Results   Component Value Date    CALCIUM 7.7 (L) 11/15/2022    PHOS 7.8 (H) 11/15/2022     No results found for: JEXERRIT07IH  Acidosis AGMA   Respiratory and metabolic   -- NADIYA   -- RRT   Lab Results   Component Value Date    CO2 15 (L) 11/15/2022       5. Hemodialysis Access (Z99.2 V45.11)- CVC    6. Nutrition/Hypoalbuminemia (E88.09) -  TF   Recent Labs   Lab 11/14/22  2211 11/15/22  0415   ALBUMIN 1.6* 1.6*       Total critical care time 35 minutes: included management of organ failures related to critical illness, titration of continuous infusions, review of pertinent labs and imaging studies, discussion with ICU team         Thank you for the consult, will follow  With any question please call [phone]  Rene Villa MD    [facility]  LACI Walters MD, FACP,   GERSON Arvizu MD,   MD LUISITO Randall MD E. V. Harmon, NP    [address]  [place], [address] [phone]

## 2022-11-15 NOTE — PROGRESS NOTES
Progress Note  [facility]    Attending: Dr. Mishra  Fellow: Dr. Cazares  Admit Date: 11/7/2022  Today's Date: 11/15/2022    SUBJECTIVE:     HPI:  67 year old male with history of HTN, lacunar infarct, anxiety and Bell's Palsy that presented to the ED for the evaluation fo 3 days worsening exertional SOB, 1 pillow orthopnea and left sided shoulder pain that radiates to back and chest pain. Patient discloses lower extremities edema but no cough or fever. Patient discloses no history of heart disease or lung disease.   Patient has significant past history of smoking (45 year pack history; stopped in 2015).  On presentation, patient was found to have mild DKA and started on IVF and insulin drip.   MICU was called for desaturation. Examination remarkable for bi-basilar crackles and lower extremities pitting edema.   CXR showed increased interstitial markings  Troponin is negative. EKG showed RBBB (old) and T wave abnormality in the inferior leads (new)  Review of old imagings, CT scan of 10/28/2021 showed centrilobular emphysema with an upper lobe predominance and multiple bilateral solid pulmonary nodules.  Bed side Cardiac and lung US showed bilateral b lines (right > left) with hyper-dynamic heart  IVF was discontinued and one dose of IV lasix ordered     Blood culture positive for staphylococcus aureus. SANTA of 11/11 showed AV with mobile vegetation/mass but no AR, tri leaflet valve without any obvious anatomic abnormalities. Patient on Cefazolin.    Interval Hx: Patient seen and examined this morning. ON patient with trouble with vent. Patient was noted of having elevated peak pressures and congested chest sounds. CRRT was also unable to be completely run overnight and patient only with 475mL of fluid removal. Patient's sedation was increased overnight and repeat chest xray was taken. No significant detrimental change seen on xray. This morning, vent settings adjusted allowing for increased  expiration time and patient also given duo-neb treatment. Patient more comfortable after vent adjustment.     Review of Systems   Unable to perform ROS: Intubated     OBJECTIVE:     Vital Signs Trends/Hx Reviewed  Vitals:    11/15/22 0903 11/15/22 0930 11/15/22 1102 11/15/22 1115   BP: (!) 103/55 (!) 102/51  (!) 113/59   BP Location:  Left arm  Left arm   Patient Position:  Lying  Lying   Pulse: 85 83 79 82   Resp: 15 18 18 16   Temp:    98.3 °F (36.8 °C)   TempSrc:    Oral   SpO2: (!) 93% (!) 93% (!) 94% (!) 92%   Weight:       Height:           Vent Mode: A/CMV-VC  Oxygen Concentration (%):  [50-70] 50  Resp Rate Total:  [15 br/min-30 br/min] 16 br/min  Vt Set:  [450 mL-500 mL] 500 mL  PEEP/CPAP:  [7 cmH20-10.6 cmH20] 7 cmH20  Mean Airway Pressure:  [11.8 wqE94-04.1 cmH20] 17.5 cmH20    Physical Exam  Constitutional:       Appearance: He is not diaphoretic.      Comments: Patient is sedated and intubated   HENT:      Head: Normocephalic and atraumatic.      Right Ear: External ear normal.      Left Ear: External ear normal.      Mouth/Throat:      Mouth: Mucous membranes are moist.   Cardiovascular:      Rate and Rhythm: Normal rate and regular rhythm.      Pulses: Normal pulses.      Heart sounds: Normal heart sounds. No murmur heard.     Comments: Edema present on LE bilaterally  Pulmonary:      Breath sounds: Rales present.   Abdominal:      General: There is no distension.      Palpations: Abdomen is soft.   Skin:     General: Skin is warm.      Capillary Refill: Capillary refill takes less than 2 seconds.       Laboratory:  Recent Labs   Lab 11/15/22  0415   WBC 23.26*   RBC 3.97*   HGB 12.2*   HCT 34.7*   *   MCV 87   MCH 30.7   MCHC 35.2     Recent Labs   Lab 11/15/22  0919   *   K 5.4*   CL 98   CO2 14*   BUN 97*   CREATININE 6.5*   MG 2.8*     Recent Labs   Lab 11/15/22  1110   PH 7.175*   PCO2 54.3*   PO2 76*   HCO3 20.0*   POCSATURATED 90*   BE -8         Chest Imaging:   Chest x-ray  significant for no significant interval detrimental change from prior chest radiograph 11/14/2022      ASSESSMENT & RECOMMENDATIONS     Neuro/Psych  Patient is currently intubated  Sedated on Fentanyl and Precedex  RASS goal: -1 to 0  Current RASS: -2  light sedation, briefly awakes to voice (eye opening)  Wean sedation as able     CV  Acute bacterial native valve endocarditis  Blood culture from 11/14 NGTD  Continue renally dosed cefazolin  ID following     Atrial fibrillation with RVR  Rate is controlled  Continue amiodarone and metoprolol     Pulm  #Acute hypoxemic respiratory failure  Vent settings as above  Most recent ABG as above  CXR with no significant change from prior  Vent settings changed to decrease peak pressures  Continue CRRT for fluid removal  Wean vent settings as tolerated and SBT once appropriate    FEN/GI  Diet: NPO  is not tolerating PO  Electrolytes are wnl  Stress ulcer prophylaxis: Protonix     RENAL  UOP: 95cc, 524ml UF, In: 1237cc, net +617.7cc in last 24 hrs    BUN/Cr: 93/6.3, baseline 1.3  #NADIYA  CRRT continuing to be interrupted  Line access replaced to subclavian  Will continue to diurese with CRRT  Continue to monitor renal status and urine output     Heme  H/H 12.2/34.7; stable  WBC 23.26  DVT prophylaxis: Lovenox    Endo  #DKA  Patient with significantly elevated BG, acidosis, anion gap, evidence of ketones  Now resolved  Continue detemir 10u BID  MSSI  Goal glucose 140-180    ID  Tmax over last 24hr: 98.3  Bld Cultures from 11/13 notable for staph aureus  Repeat Blood Cx NGTD  Elbow abscess drained and notable for gram postive cocci  Ortho consulted for further drainage and source control  Currently on Cefazolin  Continue antibiotics as above and de-escalate when able      Gurpreet Rao MD PGY-2  [facility]   11/15/2022 11:54 AM

## 2022-11-15 NOTE — PROGRESS NOTES
"  [facility] Progress Note    Assessment/Recommendations:     66yo man w/a history of HTN, prior CVA, Bell's palsy, COPD, and tobacco use who was admitted on 2022 with 4 days of CP, SOB, N/V, and malaise and was found to have polymicrobial bacteremia (MSSA, GBS) due to native AV endocarditis with associated R olecranon abscess/bursitis (s/p bedside I&D 2022) and DKA from newly diagnosed diabetes (A1c>14%). His course has been c/b hypoxic RF (requiring intubation), septic shock (with brief pressor requirement), AF w/RVR, (s/p amio load), and NADIYA (requiring CRRT with difficulty tolerating). He remains critically ill while awaiting culture clearance.     - would continue cefazolin now at 2g IV q12h given CRRT resumption (adjusted dose)  - await pending repeat blood cx to assess for clearance ( NGTD;  NGTD)  - will consider need for further elbow debridement based on exam over next few days  - will require 6wk course of antibiotics -- no PICC or midline permanent placement until culture clearance has been documented  - additional imaging (of spine, etc.) to be obtained based on exam after extubation given ongoing leukocytosis     ID will follow.     Allyssa Eid MD  [facility] Attending  [phone]    Subjective:      Elbow swelling on right increasing and tapped in evening -- purulence expressed and cx pending. Ortho following. Weaning from pressors but difficulty with vent synchrony and tolerating CRRT overnight. Afebrile. WBC in 20's still.     Objective:   Last 24 Hour Vital Signs:  BP  Min: 62/36  Max: 133/63  Temp  Av °F (36.7 °C)  Min: 97 °F (36.1 °C)  Max: 98.7 °F (37.1 °C)  Pulse  Av.6  Min: 56  Max: 89  Resp  Av  Min: 9  Max: 28  SpO2  Av %  Min: 92 %  Max: 100 %  Height  Av' 10" (177.8 cm)  Min: 5' 10" (177.8 cm)  Max: 5' 10" (177.8 cm)  Weight  Av kg (213 lb 13.5 oz)  Min: 94 kg (207 lb 3.7 oz)  Max: 100 kg (220 lb 7.4 oz)  I/O last 3 completed " shifts:  In: 1762 [I.V.:504.6; NG/GT:1195; IV Piggyback:62.4]  Out: 664.3 [Urine:140; Other:374.3; Stool:150]    Physical Exam  Constitutional:       Appearance: He is ill-appearing.      Comments: Sedated on ventilator   HENT:      Head: Atraumatic.      Right Ear: External ear normal.      Left Ear: External ear normal.      Mouth/Throat:      Comments: ET tube in place  Cardiovascular:      Rate and Rhythm: Normal rate and regular rhythm.      Heart sounds: Normal heart sounds.   Pulmonary:      Comments: Air movement bilateral lungs on auscultation  Abdominal:      General: There is no distension.      Palpations: Abdomen is soft.   Musculoskeletal:         General: No deformity.   Skin:     General: Skin is warm.   Neurological:      Comments: Medically sedated. Unable to perform full neurological assessment      Vent Mode: A/CMV-VC  Oxygen Concentration (%):  [50-70] 50  Resp Rate Total:  [15 br/min-30 br/min] 17 br/min  Vt Set:  [450 mL-500 mL] 480 mL  PEEP/CPAP:  [7 cmH20-10.6 cmH20] 8 cmH20  Mean Airway Pressure:  [11.8 euA09-54.1 cmH20] 14.7 cmH20      Laboratory:  Laboratory Data Reviewed: yes  Pertinent Findings:  Recent Labs   Lab 11/09/22 2007 11/09/22  2352 11/13/22  0448 11/13/22  1218 11/14/22  0550 11/14/22  2211 11/15/22  0415 11/15/22  0919 11/15/22  1124   WBC  --    < > 15.07*  --  22.38*  --  23.26*  --   --    HGB  --    < > 12.6*  --  13.1*  --  12.2*  --   --    HCT  --    < > 35.2*  --  37.3*  --  34.7*  --   --    PLT  --    < > 131*  --  117*  --  120*  --   --    MCV  --    < > 87  --  87  --  87  --   --    RDW  --    < > 13.4  --  13.8  --  14.2  --   --    *   < > 130*   < > 131*   < > 133* 131* 132*   K 4.0   < > 4.6   < > 4.8   < > 5.1 5.4* 5.3*   CL 96   < > 97   < > 96   < > 99 98 99   CO2 18*   < > 21*   < > 15*   < > 15* 14* 14*   BUN 62*   < > 75*   < > 96*   < > 89* 97* 93*   CREATININE 3.0*   < > 5.4*   < > 6.5*   < > 6.1* 6.5* 6.3*   *  545*   < > 126*   < >  120*   < > 156* 194* 198*   PROT 5.9*  --   --   --   --   --   --   --   --    ALBUMIN 2.0*  --   --   --   --    < > 1.6* 1.7* 1.7*   BILITOT 1.2*  --   --   --   --   --   --   --   --    AST 59*  --   --   --   --   --   --   --   --    ALKPHOS 131  --   --   --   --   --   --   --   --    ALT 58*  --   --   --   --   --   --   --   --     < > = values in this interval not displayed.           Microbiology Data:  11/9 blood cx: MSSA/GBS in first sets; MSSA only in later sets  11/9 urine culture MSSA  11/10 blood cx: MSSA  11/10 sputum cx MSSA  11/12 blood cx NGTD        Antimicrobials:  Cefazolin    Other Results:  Radiology Results:  X-Ray Chest 1 View    Result Date: 11/9/2022  EXAMINATION: XR CHEST 1 VIEW CLINICAL HISTORY: ETT placement; TECHNIQUE: Single frontal view of the chest was performed. COMPARISON: 11/08/2022 FINDINGS: Endotracheal tube tip projects approximately 5 cm above the mamie.  Enteric tube courses through the left upper quadrant however extends past field of view. Mediastinal structures are midline.  Stable cardiomediastinal silhouette.  Aortic calcification. Stable diffuse bilateral interstitial attenuation which could represent pulmonary edema or infection.  No new focal airspace opacity.  Slightly increased obscuration of the left hemidiaphragm and costophrenic angle suggestive for slightly increased volume left pleural effusion and atelectasis.  No pneumothorax.     As above. Electronically signed by: Sathish Candelaria Date:    11/09/2022 Time:    08:55    SANTA: mobile AV vegetations    Current Medications:     Infusions:   sodium chloride 0.9%      dexmedetomidine (PRECEDEX) infusion Stopped (11/15/22 1411)    fentanyl 75 mcg/hr (11/15/22 1434)    NORepinephrine bitartrate-D5W 0.12 mcg/kg/min (11/15/22 1434)    propofoL 15 mcg/kg/min (11/15/22 1434)        Scheduled:   albuterol-ipratropium  3 mL Nebulization Q4H    amiodarone  200 mg Oral BID    aspirin  81 mg Oral Daily    atorvastatin   40 mg Oral QHS    ceFAZolin (ANCEF) IVPB  2 g Intravenous Q12H    enoxaparin  30 mg Subcutaneous Daily    insulin detemir U-100  10 Units Subcutaneous BID    LIDOcaine (PF) 10 mg/ml (1%)        metoprolol tartrate  25 mg Oral BID    pantoprazole  40 mg Intravenous Daily    polyethylene glycol  17 g Per NG tube Daily    senna-docusate 8.6-50 mg  1 tablet Oral BID        PRN:  sodium chloride 0.9%, sodium chloride 0.9%, sodium chloride 0.9%, sodium chloride 0.9%, sodium chloride 0.9%, acetaminophen, aluminum-magnesium hydroxide-simethicone, dextrose 10%, dextrose 10%, dextrose 10%, dextrose 10%, dextrose 10%, dextrose 10%, glucagon (human recombinant), glucagon (human recombinant), glucose, glucose, heparin (porcine), influenza 65up-adj, insulin aspart U-100, lorazepam, magnesium oxide, magnesium oxide, magnesium sulfate IVPB, magnesium sulfate IVPB, melatonin, naloxone, ondansetron, oxyCODONE-acetaminophen, potassium bicarbonate, potassium bicarbonate, potassium bicarbonate, potassium chloride, potassium, sodium phosphates, potassium, sodium phosphates, simethicone, sodium chloride 0.9%, sodium chloride 0.9%, sodium chloride 0.9%, sodium chloride 0.9%, sodium chloride 0.9%, sodium phosphate IVPB, sodium phosphate IVPB, sodium phosphate IVPB

## 2022-11-15 NOTE — PROCEDURES
"Jose Luis Rendon is a 67 y.o. male patient.    Temp: 98.2 °F (36.8 °C) (11/14/22 1512)  Pulse: 75 (11/14/22 1845)  Resp: 16 (11/14/22 1845)  BP: (!) 91/52 (11/14/22 1830)  SpO2: 95 % (11/14/22 1845)  Weight: 94 kg (207 lb 3.7 oz) (11/14/22 0600)  Height: 5' 10" (177.8 cm) (11/11/22 1317)       Central Line    Date/Time: 11/14/2022 7:03 PM  Performed by: Naty Cazares MD  Authorized by: Cooper Mishra MD     Location procedure was performed:  [facility] ICU  Pre-operative diagnosis:  Renal failure  Post-operative diagnosis:  Renal failure  Consent Done ?:  Yes  Time out complete?: Verified correct patient, procedure, equipment, staff, and site/side    Indications:  Hemodialysis  Anesthesia:  Local infiltration, general anesthesia and see MAR for details  Local anesthetic:  Lidocaine 1% without epinephrine  Preparation:  Skin prepped with ChloraPrep  Skin prep agent dried: Skin prep agent completely dried prior to procedure    Sterile barriers: All five maximal sterile barriers used - gloves, gown, cap, mask and large sterile sheet    Hand hygiene: Hand hygiene performed immediately prior to central venous catheter insertion    Location:  Right subclavian  Catheter size:  13 Fr  Inserted Catheter Length (cm):  15  Ultrasound guidance: Yes    Vessel Caliber:  Medium   patent  Comprressibility:  Normal  Needle advanced into vessel with real time ultrasound guidance.    Guidewire confirmed in vessel.    Steril sheath on probe.    Sterile gel used.  Manometry: No    Number of attempts:  1  Securement:  Line sutured, chlorhexidine patch, sterile dressing applied and blood return through all ports  Complications: No    Estimated blood loss (mL):  1  XRay:  No pneumothorax on x-rayTermination Site: superior vena cava    11/14/2022    "

## 2022-11-15 NOTE — ASSESSMENT & PLAN NOTE
Likely 2/2 COPD  CXR showing subtle bibasilar interstitial opacities.  No focal consolidation  CT chest 10/2021: Centrilobular emphysema with an upper lobe predominance.  Bibasilar crackles on exam  Standing duonebs  Started on azithromycin and prednisone; fevers overnight, broadened antibiotics and repeating flu/RSV/COVID  -now with bacteremia  Supp O2 PRN to keep O2 sat 88-92%  TTE  Strict I&O  Fluid removal with CRRT

## 2022-11-15 NOTE — EICU
Intervention Initiated From:  Bedside    Maryjo Communicated with Bedside Nurse regarding:  Time-Out    Nurse Notified:  Yes    Doctor Notified:  Yes    Comments: 1803 called into room for timeout for R subclavian Trialysis catheter for Dr Cazares, privileges checked, Dr Mishra labs and allergies reviewed with bedside, timeout complete, consent per MD,portable chest xray ordered, line sutured in placed covered with Biopatch and sterile Tegaderm, sterilization maintained tolerated well, VSS

## 2022-11-15 NOTE — EICU
Rounding (Video Assessment):  No    Intervention Initiated From:  Bedside    Maryjo Communicated with Bedside Nurse regarding:  Time-Out    Nurse Notified:  No    Doctor Notified:  No    Comments: eLert received for Time Out to perform I&D right elbow by Dr. Cazares. 3cc of purulent drainage removed via syringe.

## 2022-11-15 NOTE — PROGRESS NOTES
"[facility]  Intensive Care  Adult Nutrition  Progress Note    SUMMARY       Recommendations    Recommendation:  1. Now that propofol d/c, consider changing TF formula to Glucerna 1.5 to better meet pt needs. Initiate Glucerna 1.5 at 10ml/hr and advance as tolerated to goal rate of 55ml/hr. 2. Monitor weight/labs.   3. RD to continue to follow to monitor TF tolerance.    Goals:  TF to meet at least 85% EEN/EPN by RD follow up  Nutrition Goal Status: new  Communication of RD Recs: reviewed with RN (Jenise)    Assessment and Plan  Acute hypoxemic respiratory failure  Contributing Nutrition Diagnosis  Inadequate energy intake    Related to (etiology):   intubation    Signs and Symptoms (as evidenced by):   NPO    Interventions:  Collaboration with other providers    Nutrition Diagnosis Status:   Continues      Malnutrition Assessment  Subcutaneous Fat Loss (Final Summary): well nourished  Muscle Loss Evaluation (Final Summary): well nourished       Reason for Assessment  Reason For Assessment: RD follow-up  Diagnosis:  (DKA)  Relevant Medical History: Crockett's palsy, anxiety, lacunar infarction, back surgery  General Information Comments: Educated pt on ADA diet 11/8. Pt now intubated on vent. OG tube. TF of Diabetisource AC started and currently running at 40ml/hr. Tolerating per MICHELLE Navarro 8-skin intact. Started on CRRT. NFPE completed 11/11-nourished. No recent weight loss noted.  Nutrition Discharge Planning: d/c needs to be determined    Nutrition Risk Screen  Nutrition Risk Screen: tube feeding or parenteral nutrition    Nutrition/Diet History  Food Preferences: no Protestant or cultural food prefs identified  Factors Affecting Nutritional Intake: NPO, on mechanical ventilation    Anthropometrics  Temp: 97.9 °F (36.6 °C)  Height Method: Stated  Height: 5' 10" (177.8 cm)  Height (inches): 70 in  Weight Method: Bed Scale  Weight: 94 kg (207 lb 3.7 oz)  Weight (lb): 207.23 lb  Ideal Body Weight (IBW), Male: 166 lb  % " Ideal Body Weight, Male (lb): 124.84 %  BMI (Calculated): 29.7  BMI Grade: 25 - 29.9 - overweight  Usual Body Weight (UBW), k kg ( on admit)  % Usual Body Weight: 107.04  % Weight Change From Usual Weight: 6.82 %     Lab/Procedures/Meds  Pertinent Labs Reviewed: reviewed  Pertinent Labs Comments: Na 131L, BUN 96H, Crea 6.5H, Glu 120H, Ca 7.6L  Pertinent Medications Reviewed: reviewed  Pertinent Medications Comments: amiodarone, aspirin, insulin, lopressor, pantoprazole, senna, precedex    Estimated/Assessed Needs  Weight Used For Calorie Calculations: 94 kg (207 lb 3.7 oz)  Energy Calorie Requirements (kcal):   Energy Need Method: [facility]  Protein Requirements: 122g (1.3g/kg)  Weight Used For Protein Calculations: 94 kg (207 lb 3.7 oz)  Estimated Fluid Requirement Method: RDA Method  RDA Method (mL):   CHO Requirement: 225g    Nutrition Prescription Ordered  Current Diet Order: NPO  Current Nutrition Support Formula Ordered: Diabetisource AC  Current Nutrition Support Rate Ordered: 40 (ml)  Current Nutrition Support Frequency Ordered: ml/hr    Evaluation of Received Nutrient/Fluid Intake  Enteral Calories (kcal): 1152  Enteral Protein (gm): 57  Enteral (Free Water) Fluid (mL): 783  Other Calories (kcal): 430  % Kcal Needs: 58  % Protein Needs: 46  I/O: 1527/0  Energy Calories Required: not meeting needs  Protein Required: not meeting needs  Fluid Required: not meeting needs  Comments: LBM   % Intake of Estimated Energy Needs: 50 - 75 %  % Meal Intake: NPO    Nutrition Risk  Level of Risk/Frequency of Follow-up:  (2xweekly)     Monitor and Evaluation  Food and Nutrient Intake: energy intake  Food and Nutrient Adminstration: enteral and parenteral nutrition administration, diet order  Physical Activity and Function: nutrition-related ADLs and IADLs  Anthropometric Measurements: weight  Biochemical Data, Medical Tests and Procedures: electrolyte and renal panel  Nutrition-Focused Physical  Findings: overall appearance     Nutrition Follow-Up    RD Follow-up?: Yes

## 2022-11-15 NOTE — ASSESSMENT & PLAN NOTE
Patient with acute kidney injury likely due to acute tubular necrosis NADIYA is currently worsening. Labs reviewed- Renal function/electrolytes with Estimated Creatinine Clearance: 13.1 mL/min (A) (based on SCr of 6.3 mg/dL (H)). according to latest data. Monitor urine output and serial BMP and adjust therapy as needed. Avoid nephrotoxins and renally dose meds for GFR listed above.   - likely due to septic shock/DKA  - BP support with pressors as needed  - IV lasix trial without improvement  - Nephrology consultation:  Initiated dialysis 11/12, well tolerated  - additional IV Lasix trial on 11/13 unsuccessful  - CRRT

## 2022-11-16 PROBLEM — Z51.5 COMFORT MEASURES ONLY STATUS: Status: ACTIVE | Noted: 2022-01-01

## 2022-11-16 NOTE — ASSESSMENT & PLAN NOTE
- SANTA with significant vegetations  - continue IV antibiotics  - serial cultures for clearance  - anticipate 6 weeks of IV antibiotics from date of culture clearance with cefazolin  - ID following  - May require repeat TTE

## 2022-11-16 NOTE — NURSING
Pt levo requirments increasing to 0.5, HR inc to 110s. Propofol turned off. Dr. Peterson at bedside. Orders placed for CBC, lactic, ABG, stress dose steroid, and stop removing fluid via CRRT.

## 2022-11-16 NOTE — PLAN OF CARE
Patient on ventilator with documented settings.  Pt intubated with 8.0 ETT secured at 24 cm at the teeth. Alarms are adequately set and functioning properly. AMBU bag and mask at bedside.  Will continue to monitor.

## 2022-11-16 NOTE — PLAN OF CARE
Problem: Adult Inpatient Plan of Care  Goal: Plan of Care Review  Outcome: Ongoing, Progressing   Patient remains intubated and sedated. Propofol, fentanyl, and levophed titrated to maintain goal RASS and MAP. Withdraws from pain. Vital signs stable. Oliguric. CRRT running on CVVH. At net goal of 200 ml/hr fluid removal. Repositioned frequently. Skin protection interventions in place. Safety maintained. Plan of care reviewed with patient.

## 2022-11-16 NOTE — ASSESSMENT & PLAN NOTE
Patient with acute kidney injury likely due to acute tubular necrosis NADIYA is currently worsening. Labs reviewed- Renal function/electrolytes with Estimated Creatinine Clearance: 25.6 mL/min (A) (based on SCr of 3.1 mg/dL (H)). according to latest data. Monitor urine output and serial BMP and adjust therapy as needed. Avoid nephrotoxins and renally dose meds for GFR listed above.   - likely due to septic shock/DKA  - BP support with pressors as needed  - IV lasix trial without improvement  - Nephrology consultation:  Initiated dialysis 11/12, well tolerated  - additional IV Lasix trial on 11/13 unsuccessful  - Remains oliguric  - CRRT

## 2022-11-16 NOTE — PROGRESS NOTES
Progress Note  [facility]    Attending: Dr. Mishra  Fellow: Dr. Cazares  Admit Date: 11/7/2022  Today's Date: 11/16/2022    SUBJECTIVE:     HPI:  67 year old male with history of HTN, lacunar infarct, anxiety and Bell's Palsy that presented to the ED for the evaluation fo 3 days worsening exertional SOB, 1 pillow orthopnea and left sided shoulder pain that radiates to back and chest pain. Patient discloses lower extremities edema but no cough or fever. Patient discloses no history of heart disease or lung disease.   Patient has significant past history of smoking (45 year pack history; stopped in 2015).  On presentation, patient was found to have mild DKA and started on IVF and insulin drip.   MICU was called for desaturation. Examination remarkable for bi-basilar crackles and lower extremities pitting edema.   CXR showed increased interstitial markings  Troponin is negative. EKG showed RBBB (old) and T wave abnormality in the inferior leads (new)  Review of old imagings, CT scan of 10/28/2021 showed centrilobular emphysema with an upper lobe predominance and multiple bilateral solid pulmonary nodules.  Bed side Cardiac and lung US showed bilateral b lines (right > left) with hyper-dynamic heart  IVF was discontinued and one dose of IV lasix ordered     Blood culture positive for staphylococcus aureus. SANTA of 11/11 showed AV with mobile vegetation/mass but no AR, tri leaflet valve without any obvious anatomic abnormalities. Patient on Cefazolin.    Interval Hx: Patient seen and examined this morning. ON patient with episode of Afib w/ RVR to 150s. Patient was given amiodarone 300mg bolus and amiodarone drip was started. Patient able to convert back to NSR. CRRT able to be run overnight to goal of 200ml/hr (6.7L of fluid removed). Patient still continuing to be oliguric with only 10ml urine overnight. Detailed exam performed while off sedation. No signs of abscess formation along spine,  groin, or hip spaces. No increased focal tenderness to palpation along skin surfaces. No sites of increased erythema, discharge, crepitus.      Review of Systems   Unable to perform ROS: Intubated     OBJECTIVE:     Vital Signs Trends/Hx Reviewed  Vitals:    11/16/22 1100 11/16/22 1115 11/16/22 1119 11/16/22 1130   BP: (!) 105/53 (!) 136/50 120/60  Comment: Simultaneous filing. User may not have seen previous data. (!) 110/48   BP Location:       Patient Position:       Pulse: 84 87 85 88   Resp: 18 18 (!) 21 16   Temp: (!) 95.9 °F (35.5 °C) 96.3 °F (35.7 °C) 96.4 °F (35.8 °C)  Comment: Simultaneous filing. User may not have seen previous data. 96.4 °F (35.8 °C)   TempSrc:       SpO2: (!) 91% 98% 99% 100%   Weight:       Height:           Vent Mode: A/CMV-VC  Oxygen Concentration (%):  [] 35  Resp Rate Total:  [13 br/min-33 br/min] 18 br/min  Vt Set:  [480 mL] 480 mL  PEEP/CPAP:  [4.7 cmH20-10.1 cmH20] 8 cmH20  Pressure Support:  [5 cmH20] 5 cmH20  Mean Airway Pressure:  [6.3 coU40-57.7 cmH20] 10.8 cmH20    Physical Exam  Constitutional:       Appearance: He is not diaphoretic.      Comments: Patient is sedated and intubated   HENT:      Head: Normocephalic and atraumatic.      Right Ear: External ear normal.      Left Ear: External ear normal.      Mouth/Throat:      Mouth: Mucous membranes are moist.   Cardiovascular:      Rate and Rhythm: Normal rate and regular rhythm.      Pulses: Normal pulses.      Heart sounds: Normal heart sounds. No murmur heard.     Comments: Edema present on LE bilaterally  Pulmonary:      Breath sounds: Rales present.   Abdominal:      General: There is no distension.      Palpations: Abdomen is soft.   Skin:     General: Skin is warm.      Capillary Refill: Capillary refill takes less than 2 seconds.      Comments: No signs of abscess formation along spine, groin, or hip spaces. No increased tenderness to palpation along skin surfaces. No sites of increased erythema, discharge,  crepitus.         Laboratory:  Recent Labs   Lab 11/16/22  0854   WBC 30.79*   RBC 4.65   HGB 14.1   HCT 41.3      MCV 89   MCH 30.3   MCHC 34.1       Recent Labs   Lab 11/16/22  0420 11/16/22  0854   *  --    K 5.2*  --    CL 97  --    CO2 19*  --    BUN 56*  --    CREATININE 3.8*  --    MG  --  2.3       No results for input(s): PH, PCO2, PO2, HCO3, POCSATURATED, BE in the last 24 hours.        Chest Imaging:   Chest x-ray significant for no significant interval detrimental change from prior chest radiograph 11/14/2022      ASSESSMENT & RECOMMENDATIONS     Neuro/Psych  Patient is currently intubated  Sedated on Fentanyl and Propofol  RASS goal: -1 to 0  Current RASS: -3 light sedation, briefly awakes to voice (eye opening)  Scheduled sedation holiday    Wean sedation as able     CV  Acute bacterial native valve endocarditis  Blood culture from 11/14 NGTD  Continue renally dosed cefazolin  ID following     Paroxysmal Atrial fibrillation with RVR  Patient with episode of RVR ON to 150s  Amio bolus and drip started with conversion to NSR  Continue amiodarone drip, hold metoprolol     Pulm  #Acute hypoxemic respiratory failure  Vent settings as above  Most recent ABG as above  CXR with no significant change from prior  Vent settings changed to decrease peak pressures  Patient tolerating vent more adequately  Continue CRRT for fluid removal  Wean vent settings as tolerated and SBT once appropriate    FEN/GI  Diet: NPO  is not tolerating PO  Electrolytes are wnl  Stress ulcer prophylaxis: Protonix  Will start TF for nutrition     RENAL  UOP: 10cc, 6.7ml UF, In: 1620cc, net -5299cc in last 24 hrs    BUN/Cr: 42/3.1, baseline 1.3  #NADIYA  CRRT will adequate fluid removal ON  Cr down-trending after fluid removal   Will reduce CRRT rate to help maintain adequate MAPs  Will continue to diurese with CRRT  Continue to monitor renal status and urine output     Heme  H/H 12.2/34.7; stable  WBC 30.76  DVT prophylaxis:  Lovenox    Endo  #DKA  Patient with significantly elevated BG, acidosis, anion gap, evidence of ketones  Now resolved  Continue detemir 10u BID  MSSI  Goal glucose 140-180    ID  #Leukocytosis/bacteremia source  Tmax over last 24hr: 98.1  Bld Cultures from 11/13 notable for staph aureus  Repeat Blood Cx NGTD  Elbow abscess drained and notable for staph aureus  Currently on Cefazolin  Detailed exam completed with no signs of additional abscess formation along the groin space, hip, or spine  Skin with no areas of breakdown, erythema, crepitus, or discharge  Patient with no increased tenderness to palpation while off sedation  Continue antibiotics as above and de-escalate when able  Appreciate ID recommendations    Gurpreet Rao MD PGY-2  [facility]   11/16/2022 11:54 AM

## 2022-11-16 NOTE — ASSESSMENT & PLAN NOTE
Patient with Paroxysmal (<7 days) atrial fibrillation which is controlled currently with Amiodarone. Patient is currently in sinus rhythm.[id number] Score: 2.  Anticoagulation indicated. Anticoagulation done with lovenox.  -Amiodarone

## 2022-11-16 NOTE — PROGRESS NOTES
11/15/22 1615   Treatment   Treatment Type CVVH   Treatment Status Daily equipment check   Dialysis Machine Number [id number]   Solutions Labeled and Current  Yes   Access Right;Temporary Cath;IJ   Catheter Dressing Intact  Yes   Alarms Engaged Yes   CRRT Comments progressing without complications;BFR decreased 2/2 arterial pressure alarming with excessive movement. MD aware   Prescription   Time (Hours) Continuous   Dialysate K + (mEq/L) 2   Dialysate CA + (mEq/L) 2   Dialysate HCO3 - (Bicarb) (mEq/L) 35   Dialysate Na + (mEq/L) 140   Cartridge Type Other  ([id number])   Dialysate Flow Rate (mL/min) Other  (2500)   UF Goal Rate 200 mL/hr   Fluids and cartridges stocked on unit.

## 2022-11-16 NOTE — PROGRESS NOTES
Nephrology Progress Note     Consult Requested By: Martin Grady MD  Reason for Consult: NADIYA     SUBJECTIVE:        ?    Review of Systems   Unable to perform ROS: Acuity of condition     Past Medical History:   Diagnosis Date    Anxiety 8/5/2016    Bell's palsy     Benign essential HTN 4/27/2018    Lacunar infarction     Remote finding seen on cranial imaging during acute workup for Bell's palsy/[place] White    Pneumonia     age 5    Psoriasis        OBJECTIVE:     Vital Signs (Most Recent)  Vitals:    11/16/22 1100 11/16/22 1115 11/16/22 1119 11/16/22 1130   BP: (!) 105/53 (!) 136/50 120/60 (!) 110/48   BP Location:       Patient Position:       Pulse: 84 87 85 88   Resp: 18 18 (!) 21 16   Temp: (!) 95.9 °F (35.5 °C) 96.3 °F (35.7 °C) 96.4 °F (35.8 °C) 96.4 °F (35.8 °C)   TempSrc:       SpO2: (!) 91% 98% 99% 100%   Weight:       Height:             Date 11/16/22 0700 - 11/17/22 0659   Shift 0027-9108 2947-1577 8070-3317 24 Hour Total   INTAKE   I.V.(mL/kg) 142.5(1.5)   142.5(1.5)   NG/   120   IV Piggyback 49.3   49.3   Shift Total(mL/kg) 311.8(3.4)   311.8(3.4)   OUTPUT   Other 788   788   Shift Total(mL/kg) 788(8.6)   788(8.6)   Weight (kg) 92 92 92 92             Medications:   albuterol-ipratropium  3 mL Nebulization Q4H    aspirin  81 mg Oral Daily    atorvastatin  40 mg Oral QHS    ceFAZolin (ANCEF) IVPB  2 g Intravenous Q12H    enoxaparin  30 mg Subcutaneous Daily    insulin detemir U-100  10 Units Subcutaneous BID    pantoprazole  40 mg Intravenous Daily    polyethylene glycol  17 g Per NG tube Daily    senna-docusate 8.6-50 mg  1 tablet Oral BID           Physical Exam  Vitals and nursing note reviewed.   Constitutional:       General: He is not in acute distress.     Appearance: He is ill-appearing. He is not diaphoretic.   HENT:      Head: Normocephalic and atraumatic.      Mouth/Throat:      Pharynx: No oropharyngeal exudate.   Eyes:      General: No scleral icterus.     Conjunctiva/sclera:  Conjunctivae normal.   Cardiovascular:      Rate and Rhythm: Tachycardia present. Rhythm irregular.      Heart sounds: Normal heart sounds. No murmur heard.  Pulmonary:      Effort: No respiratory distress.      Comments: Intubated   Abdominal:      General: There is no distension.      Palpations: Abdomen is soft.      Tenderness: There is no abdominal tenderness.   Musculoskeletal:         General: Swelling present.      Cervical back: Normal range of motion and neck supple.      Right lower leg: Edema present.      Left lower leg: Edema present.   Skin:     General: Skin is warm and dry.      Findings: No erythema.   Neurological:      General: No focal deficit present.      Cranial Nerves: No cranial nerve deficit.       Laboratory:  Recent Labs   Lab 11/14/22  0550 11/15/22  0415 11/16/22  0854   WBC 22.38* 23.26* 30.79*   HGB 13.1* 12.2* 14.1   HCT 37.3* 34.7* 41.3   * 120* 209   MONO 2.0* 4.9  1.2* 4.0  CANCELED       Recent Labs   Lab 11/15/22  1124 11/15/22  2316 11/16/22  0420   * 133* 131*   K 5.3* 4.8 5.2*   CL 99 98 97   CO2 14* 15* 19*   BUN 93* 61* 56*   CREATININE 6.3* 4.1* 3.8*   CALCIUM 7.8* 8.8 9.4   PHOS 8.8* 6.4* 6.6*         Diagnostic Results:  X-Ray: Reviewed  US: Reviewed  Echo: Reviewed  ASSESSMENT/PLAN:     1. NADIYA -  ?CKD DKA Sepsis Bacteremia Endocarditis on IV abx BP soft also A-fib   -- CVC didn't work well was  exchanged 11/14 still issues with   line over the night   TPA was placed CRRT stopped resumed 11/15 running well since NET negative 5L but still + 4 since admission no Urine Continue CRRT with UF    MAP  60-65 will use NS pre filter      2. Sepsis Endocarditis -  STAPHYLOCOCCUS AUREUS   -- Cefazolin 2g Q12  - while on CRRT     3. Anemia       Recent Labs   Lab 11/14/22  0550 11/15/22  0415 11/16/22  0854   HGB 13.1* 12.2* 14.1   HCT 37.3* 34.7* 41.3   * 120* 209         Iron   No results found for: IRON, TIBC, FERRITIN, SATURATEDIRO    4. MBD (E88.9 I17.80)  -  Recent Labs   Lab 11/16/22  0420   CALCIUM 9.4   PHOS 6.6*       Recent Labs   Lab 11/15/22  1124 11/15/22  2323 11/16/22  0854   MG 2.7* 2.3 2.3         Lab Results   Component Value Date    CALCIUM 9.4 11/16/2022    PHOS 6.6 (H) 11/16/2022     No results found for: [id number]  Acidosis AGMA   Respiratory and metabolic   -- NADIYA   -- RRT   Lab Results   Component Value Date    CO2 19 (L) 11/16/2022       5. Hemodialysis Access (Z99.2 V45.11)- CVC    6. Nutrition/Hypoalbuminemia (E88.09) -  TF   Recent Labs   Lab 11/15/22  2316 11/16/22  0420   ALBUMIN 1.9* 2.1*       Total critical care time 35 minutes: included management of organ failures related to critical illness, titration of continuous infusions, review of pertinent labs and imaging studies, discussion with ICU team and Family         Thank you for the consult, will follow  With any question please call [phone]  Rene Villa MD    [facility]  LACI Walters MD, FACPGERSON MD,   MD LUISITO Randall MD E. V. Harmon, NP    [address]  [place], [address] [phone]

## 2022-11-16 NOTE — SUBJECTIVE & OBJECTIVE
Interval History:  Remains intubated on CRRT. Started on amio gtt for afib w/ rvr. Uptrending wbc with increased pressor requirements. R elbow abscess culture growing staph.     Review of Systems   Unable to perform ROS: Intubated   Objective:     Vital Signs (Most Recent):  Temp: 98.1 °F (36.7 °C) (11/16/22 1530)  Pulse: 101 (11/16/22 1530)  Resp: 17 (11/16/22 1530)  BP: (!) 88/52 (11/16/22 1530)  SpO2: 98 % (11/16/22 1530)   Vital Signs (24h Range):  Temp:  [94.1 °F (34.5 °C)-98.1 °F (36.7 °C)] 98.1 °F (36.7 °C)  Pulse:  [] 101  Resp:  [10-25] 17  SpO2:  [89 %-100 %] 98 %  BP: ()/(37-60) 88/52     Weight: 92 kg (202 lb 13.2 oz)  Body mass index is 29.1 kg/m².    Intake/Output Summary (Last 24 hours) at 11/16/2022 1621  Last data filed at 11/16/2022 1500  Gross per 24 hour   Intake 1381.85 ml   Output 6800 ml   Net -5418.15 ml        Physical Exam  Constitutional:       Appearance: He is ill-appearing. He is not toxic-appearing.      Interventions: He is sedated and intubated.   HENT:      Head: Normocephalic and atraumatic.      Mouth/Throat:      Mouth: Mucous membranes are moist.   Eyes:      Conjunctiva/sclera: Conjunctivae normal.      Pupils: Pupils are equal, round, and reactive to light.   Cardiovascular:      Rate and Rhythm: Normal rate and regular rhythm.      Heart sounds: Normal heart sounds.   Pulmonary:      Effort: Pulmonary effort is normal. He is intubated.      Breath sounds: Rales (bibasilar) present.   Abdominal:      General: There is no distension.      Palpations: Abdomen is soft.      Tenderness: There is no abdominal tenderness. There is no guarding.   Musculoskeletal:         General: Normal range of motion.      Cervical back: Normal range of motion and neck supple.      Right lower leg: Edema present.      Left lower leg: Edema present.   Skin:     General: Skin is warm and dry.   Neurological:      General: No focal deficit present.      Sensory: No sensory deficit.       Comments: Facial droop   Psychiatric:      Comments: Unable to assess       Significant Labs: All pertinent labs within the past 24 hours have been reviewed.    Significant Imaging: I have reviewed all pertinent imaging results/findings within the past 24 hours.

## 2022-11-16 NOTE — ASSESSMENT & PLAN NOTE
- likely due to post-viral PNA with staph bacteremia and endocarditis  - likely distributive component due to sedation  - wean off levophed  - continue IV abx  - repeat BCx for clearance  - worsening leukocytosis and pressor requirements  - joint evaluation off sedation for signs of tenderness/agitation  - may require further imaging

## 2022-11-16 NOTE — NURSING
Oral and axillary temps not registering on thermometer. Core esophageal temp probe placed and reading 34.8 C. Tom palencia

## 2022-11-16 NOTE — PROGRESS NOTES
[facility]ious Diseases Progress Note    Assessment/Recommendations:     66yo man w/a history of HTN, prior CVA, Bell's palsy, COPD, and tobacco use who was admitted on 2022 with 4 days of CP, SOB, N/V, and malaise and was found to have polymicrobial bacteremia (MSSA, GBS) due to native AV endocarditis with associated R olecranon abscess/bursitis (s/p bedside I&D 2022) and DKA from newly diagnosed diabetes (A1c>14%). His course has been c/b hypoxic RF (requiring intubation), septic shock (with brief pressor requirement), AF w/RVR, (s/p amio load), and NADIYA (requiring CRRT with difficulty tolerating). He remains critically ill while awaiting culture clearance.     - would continue cefazolin now at 2g IV q12h  given CRRT resumption (adjusted dose) especially considering continued potential sources for staph  - await pending repeat blood cx to assess for clearance ( staph aureus;  NGTD)  - will consider need for further elbow debridement based on exam over next few days--> elbow fluid aspirate with staph aureus susceptibility pending  - will require 6wk course of antibiotics -- no PICC or midline permanent placement until culture clearance has been documented  - additional imaging (of spine, etc.) to be obtained based on exam after extubation given ongoing leukocytosis  -Recommend thoracentesis of L >R pleural effusion if possible     ID will follow.     Toby Torres MD  [facility]  ID service    Subjective:      Still required pressors overnight. Per discussion with pulm crit team may be weaning some sedation down today for more thorough physical exam.     Objective:   Last 24 Hour Vital Signs:  BP  Min: 70/51  Max: 136/50  Temp  Av.3 °F (35.7 °C)  Min: 94.1 °F (34.5 °C)  Max: 98.1 °F (36.7 °C)  Pulse  Av.7  Min: 61  Max: 155  Resp  Av.6  Min: 10  Max: 25  SpO2  Av.5 %  Min: 89 %  Max: 100 %  Weight  Av kg (202 lb 13.2 oz)  Min: 92 kg (202 lb 13.2 oz)  Max: 92 kg  (202 lb 13.2 oz)  I/O last 3 completed shifts:  In: 2005.9 [I.V.:1716.2; NG/GT:190; IV Piggyback:99.7]  Out: 7605.3 [Urine:45; Drains:100; Other:7400.3; Stool:60]    Physical Exam  Constitutional:       Appearance: He is ill-appearing.      Comments: Sedated on ventilator   HENT:      Head: Atraumatic.      Right Ear: External ear normal.      Left Ear: External ear normal.      Mouth/Throat:      Comments: ET tube in place  Cardiovascular:      Rate and Rhythm: Normal rate and regular rhythm.      Heart sounds: Normal heart sounds.   Pulmonary:      Comments: Air movement bilateral lungs on auscultation  Abdominal:      General: There is no distension.      Palpations: Abdomen is soft.   Musculoskeletal:         General: No deformity. R elbow edema present  Skin:     General: Skin is warm.   Neurological:      Comments: Medically sedated. Unable to perform full neurological assessment      Vent Mode: A/CMV-VC  Oxygen Concentration (%):  [] 35  Resp Rate Total:  [13 br/min-33 br/min] 20 br/min  Vt Set:  [480 mL] 480 mL  PEEP/CPAP:  [4.7 cmH20-10.1 cmH20] 8 cmH20  Pressure Support:  [5 cmH20] 5 cmH20  Mean Airway Pressure:  [6.3 xrE40-15.7 cmH20] 10.8 cmH20      Laboratory:  Laboratory Data Reviewed: yes  Pertinent Findings:  Recent Labs   Lab 11/09/22 2007 11/09/22  2352 11/14/22  0550 11/14/22  2211 11/15/22  0415 11/15/22  0919 11/15/22  1124 11/15/22  2316 11/16/22  0420 11/16/22  0854   WBC  --    < > 22.38*  --  23.26*  --   --   --   --  30.79*   HGB  --    < > 13.1*  --  12.2*  --   --   --   --  14.1   HCT  --    < > 37.3*  --  34.7*  --   --   --   --  41.3   PLT  --    < > 117*  --  120*  --   --   --   --  209   MCV  --    < > 87  --  87  --   --   --   --  89   RDW  --    < > 13.8  --  14.2  --   --   --   --  15.1*   *   < > 131*   < > 133*   < > 132* 133* 131*  --    K 4.0   < > 4.8   < > 5.1   < > 5.3* 4.8 5.2*  --    CL 96   < > 96   < > 99   < > 99 98 97  --    CO2 18*   < > 15*   < >  15*   < > 14* 15* 19*  --    BUN 62*   < > 96*   < > 89*   < > 93* 61* 56*  --    CREATININE 3.0*   < > 6.5*   < > 6.1*   < > 6.3* 4.1* 3.8*  --    *  545*   < > 120*   < > 156*   < > 198* 205* 186*  --    PROT 5.9*  --   --   --   --   --   --   --   --   --    ALBUMIN 2.0*  --   --    < > 1.6*   < > 1.7* 1.9* 2.1*  --    BILITOT 1.2*  --   --   --   --   --   --   --   --   --    AST 59*  --   --   --   --   --   --   --   --   --    ALKPHOS 131  --   --   --   --   --   --   --   --   --    ALT 58*  --   --   --   --   --   --   --   --   --     < > = values in this interval not displayed.           Microbiology Data:  Microbiology Results (last 7 days)       Procedure Component Value Units Date/Time    Culture, Respiratory with Gram Stain [[id number]] Collected: 11/16/22 1133    Order Status: Sent Specimen: Respiratory from Endotracheal Aspirate Updated: 11/16/22 1155    Culture, Fluid  (Aerobic) [[id number]]  (Abnormal) Collected: 11/14/22 1800    Order Status: Completed Specimen: Body Fluid from Elbow, Right Updated: 11/16/22 1043     AEROBIC CULTURE - FLUID STAPHYLOCOCCUS AUREUS  Moderate  Susceptibility pending       Gram Stain Result Rare WBC's      Moderate Gram positive cocci    Blood culture [[id number]]  (Abnormal) Collected: 11/12/22 0933    Order Status: Completed Specimen: Blood Updated: 11/16/22 1013     Blood Culture, Routine Gram stain aer bottle: Gram positive cocci in clusters resembling Staph      Results called to and read back by:Agata Jimenez RN 11/15/2022  18:39      STAPHYLOCOCCUS AUREUS  Susceptibility pending  ID consult required at [facility].[place] and [place] locations.      Blood culture [[id number]] Collected: 11/14/22 1939    Order Status: Completed Specimen: Blood Updated: 11/15/22 2312     Blood Culture, Routine No Growth to date      No Growth to date    Narrative:      Collection has been rescheduled by CELESTINO at 11/14/2022 18:31 Reason:   Patient in surgery doctor told me to  come back   Collection has been rescheduled by [email or web address] at 11/14/2022 18:31 Reason:   Patient in surgery doctor told me to come back     Blood culture [[id number]] Collected: 11/14/22 1939    Order Status: Completed Specimen: Blood from Antecubital, Left Arm Updated: 11/15/22 2312     Blood Culture, Routine No Growth to date      No Growth to date    Narrative:      Collection has been rescheduled by [email or web address] at 11/14/2022 18:31 Reason:   Patient in surgery doctor told me to come back   Collection has been rescheduled by [email or web address] at 11/14/2022 18:31 Reason:   Patient in surgery doctor told me to come back     Culture, Anaerobe [[id number]] Collected: 11/15/22 1326    Order Status: Sent Specimen: Abscess from Elbow, Right Updated: 11/15/22 2036    Aerobic culture [[id number]] Collected: 11/15/22 1326    Order Status: Sent Specimen: Abscess from Elbow, Right Updated: 11/15/22 2032    Blood culture [[id number]] Collected: 11/12/22 0933    Order Status: Completed Specimen: Blood from Antecubital, Left Updated: 11/15/22 1812     Blood Culture, Routine No Growth to date      No Growth to date      No Growth to date      No Growth to date    Gram stain [[id number]] Collected: 11/14/22 1800    Order Status: Canceled Specimen: Body Fluid from Elbow, Right     Blood culture [[id number]]     Order Status: Canceled Specimen: Blood     Blood culture [[id number]]     Order Status: Canceled Specimen: Blood     Blood culture [[id number]]  (Abnormal) Collected: 11/10/22 1627    Order Status: Completed Specimen: Blood Updated: 11/14/22 0902     Blood Culture, Routine Gram stain peds bottle: Gram positive cocci in clusters resembling Staph      Positive results previously called 11/12/2022  17:14      STAPHYLOCOCCUS AUREUS  ID consult required at [facility] [place].  For susceptibility see order # [id number]      Blood culture [[id number]]  (Abnormal) Collected: 11/10/22 1627    Order Status: Completed Specimen: Blood Updated:  11/13/22 1218     Blood Culture, Routine Gram stain aer bottle: Gram positive cocci in clusters resembling Staph      Results called to and read back by:Radha Yañez RN 11/11/2022  17:55      STAPHYLOCOCCUS AUREUS  ID consult required at [facility].  For susceptibility see order # [id number]      Blood culture [[id number]]  (Abnormal) Collected: 11/09/22 1719    Order Status: Completed Specimen: Blood Updated: 11/12/22 1124     Blood Culture, Routine Gram stain peds bottle: Gram positive cocci in clusters resembling Staph      Positive results previously called 11/10/2022      STAPHYLOCOCCUS AUREUS  ID consult required at [facility].  For susceptibility see order # [id number]      Blood culture [[id number]]  (Abnormal)  (Susceptibility) Collected: 11/09/22 1719    Order Status: Completed Specimen: Blood Updated: 11/12/22 1122     Blood Culture, Routine Gram stain aer bottle: Gram positive cocci in clusters resembling Staph      Positive results previously called 11/10/2022  14:39      STAPHYLOCOCCUS AUREUS  ID consult required at [facility].      Urine Culture High Risk [[id number]]  (Abnormal)  (Susceptibility) Collected: 11/09/22 1817    Order Status: Completed Specimen: Urine, Catheterized Updated: 11/12/22 1111     Urine Culture, Routine STAPHYLOCOCCUS AUREUS  10,000 - 49,999 cfu/ml      Narrative:      Indicated criteria for high risk culture:->Other  Other (specify):->BPH, prior UTI, sepsis    Culture, Respiratory with Gram Stain [[id number]]  (Abnormal)  (Susceptibility) Collected: 11/09/22 1346    Order Status: Completed Specimen: Respiratory from Tracheal Aspirate Updated: 11/12/22 0811     Respiratory Culture No Pseudomonas isolated.      STAPHYLOCOCCUS AUREUS  Moderate       Gram Stain (Respiratory) <10 epithelial cells per low power field.     Gram Stain (Respiratory) Rare WBC's     Gram Stain (Respiratory) No  organisms seen    Culture, Respiratory with Gram Stain [[id number]]  (Abnormal)  (Susceptibility) Collected: 11/09/22 1346    Order Status: Completed Specimen: Respiratory from Tracheal Aspirate Updated: 11/12/22 0811     Respiratory Culture No Pseudomonas isolated.      STAPHYLOCOCCUS AUREUS  Moderate       Gram Stain (Respiratory) <10 epithelial cells per low power field.     Gram Stain (Respiratory) Rare WBC's     Gram Stain (Respiratory) No organisms seen    Blood culture [[id number]]  (Abnormal) Collected: 11/09/22 0337    Order Status: Completed Specimen: Blood from Antecubital, Left Updated: 11/11/22 1135     Blood Culture, Routine Gram stain peds bottle: Gram positive cocci in clusters resembling Staph      Gram positive cocci in chains resembling Strep      Results called to and read back by:Lila Panda RN 11/09/2022  20:00      STAPHYLOCOCCUS AUREUS  ID consult required at [facility] and [facility].  For susceptibility see order # [id number]      Narrative:      Please draw 10 minutes apart, from 2 separate venous sites.    Blood culture [[id number]]  (Abnormal)  (Susceptibility) Collected: 11/09/22 0337    Order Status: Completed Specimen: Blood Updated: 11/11/22 1133     Blood Culture, Routine Gram stain nohemi bottle: Gram positive cocci in chains resembling Strep      Results called to and read back by:Alexus Kang RN 11/09/2022  17:02      Gram stain aer bottle: Gram positive cocci in clusters resembling Staph      Results called to and read back by:Alexus Kang RN 11/09/2022  18:41      Gram stain aer bottle: Gram positive cocci in chains resembling Strep      STREPTOCOCCUS AGALACTIAE (GROUP B)  Beta-hemolytic streptococci are routinely susceptible to   penicillins,cephalosporins and carbapenems.        STAPHYLOCOCCUS AUREUS  ID consult required at [facility] and [facility] locations.      MRSA/SA Rapid ID by PCR from Blood culture [[id number]]  (Abnormal) Collected: 11/09/22 2001     Order Status: Completed Updated: 11/09/22 2110     Staph aureus ID by PCR Positive     MRSA ID by PCR Negative    Narrative:      Please draw 10 minutes apart, from 2 separate venous sites.    MRSA/SA Rapid ID by PCR from Blood culture [[id number]]  (Abnormal) Collected: 11/09/22 0337    Order Status: Completed Updated: 11/09/22 2000     Staph aureus ID by PCR Positive     MRSA ID by PCR Negative                  Antimicrobials:  Cefazolin    Other Results:  Radiology Results:  X-Ray Chest 1 View    Result Date: 11/9/2022  EXAMINATION: XR CHEST 1 VIEW CLINICAL HISTORY: ETT placement; TECHNIQUE: Single frontal view of the chest was performed. COMPARISON: 11/08/2022 FINDINGS: Endotracheal tube tip projects approximately 5 cm above the mamie.  Enteric tube courses through the left upper quadrant however extends past field of view. Mediastinal structures are midline.  Stable cardiomediastinal silhouette.  Aortic calcification. Stable diffuse bilateral interstitial attenuation which could represent pulmonary edema or infection.  No new focal airspace opacity.  Slightly increased obscuration of the left hemidiaphragm and costophrenic angle suggestive for slightly increased volume left pleural effusion and atelectasis.  No pneumothorax.     As above. Electronically signed by: Sathish Candelaria Date:    11/09/2022 Time:    08:55    SANTA: mobile AV vegetations    Current Medications:     Infusions:   amiodarone in dextrose 5% 0.5 mg/min (11/16/22 1000)    dexmedetomidine (PRECEDEX) infusion Stopped (11/15/22 1411)    fentanyl 40 mcg/hr (11/16/22 1000)    NORepinephrine bitartrate-D5W 0.24 mcg/kg/min (11/16/22 1000)    propofoL 10 mcg/kg/min (11/16/22 1227)    vasopressin 0.04 Units/min (11/16/22 1000)        Scheduled:   albuterol-ipratropium  3 mL Nebulization Q4H    aspirin  81 mg Oral Daily    atorvastatin  40 mg Oral QHS    ceFAZolin (ANCEF) IVPB  2 g Intravenous Q12H    enoxaparin  30 mg Subcutaneous Daily    insulin  detemir U-100  10 Units Subcutaneous BID    pantoprazole  40 mg Intravenous Daily    polyethylene glycol  17 g Per NG tube Daily    senna-docusate 8.6-50 mg  1 tablet Oral BID        PRN:  sodium chloride 0.9%, sodium chloride 0.9%, sodium chloride 0.9%, sodium chloride 0.9%, sodium chloride 0.9%, acetaminophen, albuterol sulfate, aluminum-magnesium hydroxide-simethicone, dextrose 10%, dextrose 10%, dextrose 10%, dextrose 10%, dextrose 10%, dextrose 10%, glucagon (human recombinant), glucagon (human recombinant), glucose, glucose, heparin (porcine), influenza 65up-adj, insulin aspart U-100, lorazepam, magnesium oxide, magnesium oxide, magnesium sulfate IVPB, melatonin, naloxone, ondansetron, oxyCODONE-acetaminophen, potassium bicarbonate, potassium bicarbonate, potassium bicarbonate, potassium chloride, potassium, sodium phosphates, potassium, sodium phosphates, simethicone, sodium chloride 0.9%, sodium chloride 0.9%, sodium chloride 0.9%, sodium chloride 0.9%, sodium chloride 0.9%, sodium chloride 3%

## 2022-11-16 NOTE — PROGRESS NOTES
[facility] Medicine  Progress Note    Patient Name: Jose Luis Rendon  MRN: [id number]  Patient Class: IP- Inpatient   Admission Date: 11/7/2022  Length of Stay: 9 days  Attending Physician: Martin Grady MD  Primary Care Provider: Deric Armstrong MD        Subjective:     Principal Problem:MSSA bacteremia        HPI:  67M with PMH of COPD, HTN, CVA, Bell's Palsy presents with c/o sob, chest pain, nausea, vomiting x 4 days. Pt first noticed chest pain after picking up a family member off the floor. He also has had multiple episodes of N/V with retching which has exacerbated symptoms. The pain radiates around right side to his back. Also has SOB which pt states is chronic but has progressively worsened recently. Denies fever, chills, palpitations, diaphoresis, abd pain, diarrhea, dysuria. No recent fall/trauma. Workup in ED remarkable for hyperglycemia, HAGMA, elevated beta hydroxy. CXR showing Subtle bibasilar interstitial opacities, no focal consolidation. No known history of CHF or DM. Patient will be admitted to hospital medicine service for further management.      Overview/Hospital Course:  No notes on file    Interval History:  Remains intubated on CRRT. Started on amio gtt for afib w/ rvr. Uptrending wbc with increased pressor requirements. R elbow abscess culture growing staph.     Review of Systems   Unable to perform ROS: Intubated   Objective:     Vital Signs (Most Recent):  Temp: 98.1 °F (36.7 °C) (11/16/22 1530)  Pulse: 101 (11/16/22 1530)  Resp: 17 (11/16/22 1530)  BP: (!) 88/52 (11/16/22 1530)  SpO2: 98 % (11/16/22 1530)   Vital Signs (24h Range):  Temp:  [94.1 °F (34.5 °C)-98.1 °F (36.7 °C)] 98.1 °F (36.7 °C)  Pulse:  [] 101  Resp:  [10-25] 17  SpO2:  [89 %-100 %] 98 %  BP: ()/(37-60) 88/52     Weight: 92 kg (202 lb 13.2 oz)  Body mass index is 29.1 kg/m².    Intake/Output Summary (Last 24 hours) at 11/16/2022 1621  Last data filed at 11/16/2022 1500  Gross per 24 hour    Intake 1381.85 ml   Output 6800 ml   Net -5418.15 ml        Physical Exam  Constitutional:       Appearance: He is ill-appearing. He is not toxic-appearing.      Interventions: He is sedated and intubated.   HENT:      Head: Normocephalic and atraumatic.      Mouth/Throat:      Mouth: Mucous membranes are moist.   Eyes:      Conjunctiva/sclera: Conjunctivae normal.      Pupils: Pupils are equal, round, and reactive to light.   Cardiovascular:      Rate and Rhythm: Normal rate and regular rhythm.      Heart sounds: Normal heart sounds.   Pulmonary:      Effort: Pulmonary effort is normal. He is intubated.      Breath sounds: Rales (bibasilar) present.   Abdominal:      General: There is no distension.      Palpations: Abdomen is soft.      Tenderness: There is no abdominal tenderness. There is no guarding.   Musculoskeletal:         General: Normal range of motion.      Cervical back: Normal range of motion and neck supple.      Right lower leg: Edema present.      Left lower leg: Edema present.   Skin:     General: Skin is warm and dry.   Neurological:      General: No focal deficit present.      Sensory: No sensory deficit.      Comments: Facial droop   Psychiatric:      Comments: Unable to assess       Significant Labs: All pertinent labs within the past 24 hours have been reviewed.    Significant Imaging: I have reviewed all pertinent imaging results/findings within the past 24 hours.      Assessment/Plan:      * MSSA bacteremia  - blood cultures growing staph (appears to be MSSA based off PCR) and strep  - repeat blood cultures for clearance  - admission TTE without vegetation; SANTA with vegetation  - on BSABx; de-escalated to cefazolin  - ID consult; will need 6 weeks IV antibiotics from date of culture clearance      Bacteremia        Infective endocarditis of aortic valve        Bacteremia due to group B Streptococcus        Abscess of bursa of right elbow  Culture growing staph  Ortho consulted for  I&D      Problem with dialysis access  -issue with line today   -ICU team to replaced today and will dialyze patient      Acute bacterial endocarditis  - SANTA with significant vegetations  - continue IV antibiotics  - serial cultures for clearance  - anticipate 6 weeks of IV antibiotics from date of culture clearance with cefazolin  - ID following  - May require repeat TTE      Chest pain of uncertain etiology        Acute renal failure with tubular necrosis  Patient with acute kidney injury likely due to acute tubular necrosis NADIYA is currently worsening. Labs reviewed- Renal function/electrolytes with Estimated Creatinine Clearance: 25.6 mL/min (A) (based on SCr of 3.1 mg/dL (H)). according to latest data. Monitor urine output and serial BMP and adjust therapy as needed. Avoid nephrotoxins and renally dose meds for GFR listed above.   - likely due to septic shock/DKA  - BP support with pressors as needed  - IV lasix trial without improvement  - Nephrology consultation:  Initiated dialysis 11/12, well tolerated  - additional IV Lasix trial on 11/13 unsuccessful  - Remains oliguric  - CRRT    Atrial fibrillation with RVR  Patient with Paroxysmal (<7 days) atrial fibrillation which is controlled currently with Amiodarone. Patient is currently in sinus rhythm.[id number] Score: 2.  Anticoagulation indicated. Anticoagulation done with lovenox.  -Amiodarone    Septic shock  - likely due to post-viral PNA with staph bacteremia and endocarditis  - likely distributive component due to sedation  - wean off levophed  - continue IV abx  - repeat BCx for clearance  - worsening leukocytosis and pressor requirements  - joint evaluation off sedation for signs of tenderness/agitation  - may require further imaging    Endotracheally intubated  - worsening respiratory failure  - intubated 11/9  - Pulm following      Bacterial pneumonia  - suspect due to post-viral PNA with staph  - COVID and flu negative on admission  - on BSABx for  now given acute decompensation  - empiric tamiflu stopped   - staph bacteremia, switched antibiotics to cefazolin    Centrilobular emphysema  -noted on imaging  -needs PFTs as outpatient and pulmonology follow-up      COPD with acute exacerbation  As above-nightly BiPAP for in addition to prednisone, azithromycin  -decompensation prompting intubation  -stop steroids; on broad-spectrum antibiotics  -suspect may have staph pneumonia    DKA (diabetic ketoacidosis)  Workup in ED remarkable for hyperglycemia, HAGMA, elevated beta hydroxy.   New diagnosis of DM  DKA protocol  -A1c greater than 14; will need insulin at time of discharge  -reopened gap; transitioned back to insulin gtt  - now with concern for hypoglycemia; possibly due to insulin stacking  -adjust to detemir 10 units b.i.d.    Acute hypoxemic respiratory failure  Likely 2/2 COPD  CXR showing subtle bibasilar interstitial opacities.  No focal consolidation  CT chest 10/2021: Centrilobular emphysema with an upper lobe predominance.  Bibasilar crackles on exam  Standing duonebs  Started on azithromycin and prednisone; fevers overnight, broadened antibiotics and repeating flu/RSV/COVID  -now with bacteremia  Supp O2 PRN to keep O2 sat 88-92%  TTE  Strict I&O  Fluid removal with CRRT     Benign essential HTN  - hold amlodipine      VTE Risk Mitigation (From admission, onward)         Ordered     heparin (porcine) injection 2,400 Units  As needed (PRN)         11/12/22 1554     enoxaparin injection 30 mg  Daily         11/10/22 1155     IP VTE LOW RISK PATIENT  Once         11/07/22 1231     Place sequential compression device  Until discontinued         11/07/22 1231                Discharge Planning   BELINDA:      Code Status: Full Code   Is the patient medically ready for discharge?:     Reason for patient still in hospital (select all that apply): Patient unstable, Patient trending condition and Treatment  Discharge Plan A: Rehab   Discharge Delays: None known  at this time        Critical care time spent on the evaluation and treatment of severe organ dysfunction, review of pertinent labs and imaging studies, discussions with consulting providers and discussions with patient/family: 45 minutes.      Martin Grady MD  Department of Hospital Medicine   [facility] - Intensive Care

## 2022-11-17 PROBLEM — N17.9 AKI (ACUTE KIDNEY INJURY): Status: ACTIVE | Noted: 2022-01-01

## 2022-11-17 NOTE — EICU
1921 called into room via elert, patient bradycardiain 30s /52.  Carotid pulse palpable    1932 per Dr Garibay Epi IVP x 1 given;  start Dopamine @ 5; wean off Levophed; stop Amiodarone Vaso 0.04; Dr Cazares also present in room    1950  Epi @ 1 infusing; Levo @ 0.04; Dopamine @ 15    1952 external pacer via ZOLL @ 70ppm

## 2022-11-17 NOTE — NURSING
19:30- Patient bradycardic on monitor in 20s. Bounding pulse present. Paused riley drip. Called eICU into room. Per eICU MD Garibay and Pulm fellow Dr. Cazares the following meds and orders were given: Dopamine gtt ordered and started, Epi 1mg given, Epi gtt started, Atropine x2 0.5 mg IV given, 1 amp bicarb. Patient being paced with zol. Rate set to 70. Patient responding to being externally paced. Cardiology (Dr. Price) called and notified of what is going on with patient. Cath lab team called out.     2100- patient taken to cath lab for TVP placement. R fem access. Rate set to 80. V output 5.     2150-  patient returned from cath lab. No cough, gag, pupil, corneal reflexes present. No response to pain. Stat CT of head and abdomen ordered.      2200- MD updating family on patient's status. Family coming to bedside.    2330- MD at bedside with family discussing POC. Family requesting comfort care at this time.

## 2022-11-17 NOTE — SIGNIFICANT EVENT
Cross Cover Note  I was called to the bedside by the patient's nurse to report that the family was interested in transitioning to comfort focused measures.  I spoke to the patient's son and daughter-in-law and they confirmed their desires for withdraw care.  I explained to them that I will start a morphine infusion after which his life sustaining infusions will be turned off.  I am expecting him to  quickly given that he is on three vasopressors.        Mirta Quick M.D.        N.B.: Portions of this note was dictated using M*Modal Fluency Direct--there may be voice recognition errors occasionally missed on review.

## 2022-11-17 NOTE — NURSING
Dr Cazares at bedside. After ABG results MD ordered 550G NA Bicarb to be given.    Yes - the patient is able to be screened

## 2022-11-17 NOTE — PROGRESS NOTES
Pharmacokinetic Initial Assessment: IV Vancomycin    Assessment/Plan:    Initiate intravenous vancomycin with loading dose of 2000 mg once 20mg/kg due to renal function with subsequent doses when random concentrations are less than 20 mcg/mL  Desired empiric serum trough concentration is 15 to 20 mcg/mL  Draw vancomycin random level on 11/17 at 2200.  Pharmacy will continue to follow and monitor vancomycin.      Please contact pharmacy at extension [phone] with any questions regarding this assessment.     Thank you for the consult,   Derrick Rios       Patient brief summary:  Jose Luis Rendon is a 67 y.o. male initiated on antimicrobial therapy with IV Vancomycin for treatment of suspected  endocarditis    Drug Allergies:   Review of patient's allergies indicates:   Allergen Reactions    Bee sting [allergen ext-venom-honey bee] Anaphylaxis and Swelling    Venom-yellow jacket Swelling       Actual Body Weight:   92kg    Renal Function:   Estimated Creatinine Clearance: 25.6 mL/min (A) (based on SCr of 3.1 mg/dL (H)).,     Dialysis Method (if applicable):  N/A    CBC (last 72 hours):  Recent Labs   Lab Result Units 11/14/22  0550 11/15/22  0415 11/16/22  0854 11/16/22  1634   WBC K/uL 22.38* 23.26* 30.79* 31.53*   Hemoglobin g/dL 13.1* 12.2* 14.1 13.9*   Hematocrit % 37.3* 34.7* 41.3 42.0   Platelets K/uL 117* 120* 209 223   Gran % % 93.0* 86.0* 92.0* 86.5*   Lymph % % 2.0* 4.3* 2.0* 3.0*   Mono % % 2.0* 4.9 4.0 6.2   Eosinophil % % 0.0 0.3 0.0 0.0   Basophil % % 0.0 0.2 0.0 0.4   Differential Method  Manual Automated Manual Automated       Metabolic Panel (last 72 hours):  Recent Labs   Lab Result Units 11/14/22  0550 11/14/22  2211 11/15/22  0108 11/15/22  0415 11/15/22  0919 11/15/22  1124 11/15/22  2316 11/15/22  2323 11/16/22  0420 11/16/22  0854 11/16/22  1443 11/16/22  1521   Sodium mmol/L 131* 132*  --  133* 131* 132* 133*  --  131*  --  135*  --    Potassium mmol/L 4.8 4.6  --  5.1 5.4* 5.3* 4.8  --  5.2*  --   5.0  --    Chloride mmol/L 96 98  --  99 98 99 98  --  97  --  100  --    CO2 mmol/L 15* 19*  --  15* 14* 14* 15*  --  19*  --  15*  --    Glucose mg/dL 120* 152*  --  156* 194* 198* 205*  --  186*  --  143*  --    BUN mg/dL 96* 94*  --  89* 97* 93* 61*  --  56*  --  42*  --    Creatinine mg/dL 6.5* 6.0*  --  6.1* 6.5* 6.3* 4.1*  --  3.8*  --  3.1*  --    Albumin g/dL  --  1.6*  --  1.6* 1.7* 1.7* 1.9*  --  2.1*  --  1.9*  --    Magnesium mg/dL  --   --  2.6  --  2.8* 2.7*  --  2.3  --  2.3  --  2.3   Phosphorus mg/dL  --  6.6*  --  7.8* 9.5* 8.8* 6.4*  --  6.6*  --  6.2*  --        Drug levels (last 3 results):  No results for input(s): VANCOMYCINRA, VANCORANDOM, VANCOMYCINPE, VANCOPEAK, VANCOMYCINTR, VANCOTROUGH in the last 72 hours.    Microbiologic Results:  Microbiology Results (last 7 days)       Procedure Component Value Units Date/Time    Culture, Respiratory with Gram Stain [[id number]] Collected: 11/16/22 1133    Order Status: Sent Specimen: Respiratory from Endotracheal Aspirate Updated: 11/16/22 2034    Blood culture [[id number]] Collected: 11/12/22 0933    Order Status: Completed Specimen: Blood from Antecubital, Left Updated: 11/16/22 1812     Blood Culture, Routine No Growth to date      No Growth to date      No Growth to date      No Growth to date      No Growth to date    Culture, Fluid  (Aerobic) [[id number]]  (Abnormal) Collected: 11/14/22 1800    Order Status: Completed Specimen: Body Fluid from Elbow, Right Updated: 11/16/22 1043     AEROBIC CULTURE - FLUID STAPHYLOCOCCUS AUREUS  Moderate  Susceptibility pending       Gram Stain Result Rare WBC's      Moderate Gram positive cocci    Blood culture [[id number]]  (Abnormal) Collected: 11/12/22 0933    Order Status: Completed Specimen: Blood Updated: 11/16/22 1013     Blood Culture, Routine Gram stain aer bottle: Gram positive cocci in clusters resembling Staph      Results called to and read back by:Agata Jimenez RN 11/15/2022  18:39       STAPHYLOCOCCUS AUREUS  Susceptibility pending  ID consult required at [facility] [address] [place].      Blood culture [[id number]] Collected: 11/14/22 1939    Order Status: Completed Specimen: Blood Updated: 11/15/22 2312     Blood Culture, Routine No Growth to date      No Growth to date    Narrative:      Collection has been rescheduled by [email or web address] at 11/14/2022 18:31 Reason:   Patient in surgery doctor told me to come back   Collection has been rescheduled by [email or web address] at 11/14/2022 18:31 Reason:   Patient in surgery doctor told me to come back     Blood culture [[id number]] Collected: 11/14/22 1939    Order Status: Completed Specimen: Blood from Antecubital, Left Arm Updated: 11/15/22 2312     Blood Culture, Routine No Growth to date      No Growth to date    Narrative:      Collection has been rescheduled by [email or web address] at 11/14/2022 18:31 Reason:   Patient in surgery doctor told me to come back   Collection has been rescheduled by [email or web address] at 11/14/2022 18:31 Reason:   Patient in surgery doctor told me to come back     Culture, Anaerobe [[id number]] Collected: 11/15/22 1326    Order Status: Sent Specimen: Abscess from Elbow, Right Updated: 11/15/22 2036    Aerobic culture [[id number]] Collected: 11/15/22 1326    Order Status: Sent Specimen: Abscess from Elbow, Right Updated: 11/15/22 2032    Gram stain [[id number]] Collected: 11/14/22 1800    Order Status: Canceled Specimen: Body Fluid from Elbow, Right     Blood culture [[id number]]     Order Status: Canceled Specimen: Blood     Blood culture [[id number]]     Order Status: Canceled Specimen: Blood     Blood culture [[id number]]  (Abnormal) Collected: 11/10/22 1627    Order Status: Completed Specimen: Blood Updated: 11/14/22 0902     Blood Culture, Routine Gram stain peds bottle: Gram positive cocci in clusters resembling Staph      Positive results previously called 11/12/2022  17:14      STAPHYLOCOCCUS AUREUS  ID consult required at [facility] [address] [place]  [facility].  For susceptibility see order # [id number]      Blood culture [[id number]]  (Abnormal) Collected: 11/10/22 1627    Order Status: Completed Specimen: Blood Updated: 11/13/22 1218     Blood Culture, Routine Gram stain aer bottle: Gram positive cocci in clusters resembling Staph      Results called to and read back by:Radha Yañez RN 11/11/2022  17:55      STAPHYLOCOCCUS AUREUS  ID consult required at [facility].  For susceptibility see order # [id number]      Blood culture [[id number]]  (Abnormal) Collected: 11/09/22 1719    Order Status: Completed Specimen: Blood Updated: 11/12/22 1124     Blood Culture, Routine Gram stain peds bottle: Gram positive cocci in clusters resembling Staph      Positive results previously called 11/10/2022      STAPHYLOCOCCUS AUREUS  ID consult required at [facility].  For susceptibility see order # [id number]      Blood culture [[id number]]  (Abnormal)  (Susceptibility) Collected: 11/09/22 1719    Order Status: Completed Specimen: Blood Updated: 11/12/22 1122     Blood Culture, Routine Gram stain aer bottle: Gram positive cocci in clusters resembling Staph      Positive results previously called 11/10/2022  14:39      STAPHYLOCOCCUS AUREUS  ID consult required at [facility].      Urine Culture High Risk [[id number]]  (Abnormal)  (Susceptibility) Collected: 11/09/22 1817    Order Status: Completed Specimen: Urine, Catheterized Updated: 11/12/22 1111     Urine Culture, Routine STAPHYLOCOCCUS AUREUS  10,000 - 49,999 cfu/ml      Narrative:      Indicated criteria for high risk culture:->Other  Other (specify):->BPH, prior UTI, sepsis    Culture, Respiratory with Gram Stain [[id number]]  (Abnormal)  (Susceptibility) Collected: 11/09/22 1346    Order Status: Completed Specimen: Respiratory from Tracheal Aspirate Updated: 11/12/22 0811     Respiratory Culture No Pseudomonas isolated.       STAPHYLOCOCCUS AUREUS  Moderate       Gram Stain (Respiratory) <10 epithelial cells per low power field.     Gram Stain (Respiratory) Rare WBC's     Gram Stain (Respiratory) No organisms seen    Culture, Respiratory with Gram Stain [[id number]]  (Abnormal)  (Susceptibility) Collected: 11/09/22 1346    Order Status: Completed Specimen: Respiratory from Tracheal Aspirate Updated: 11/12/22 0811     Respiratory Culture No Pseudomonas isolated.      STAPHYLOCOCCUS AUREUS  Moderate       Gram Stain (Respiratory) <10 epithelial cells per low power field.     Gram Stain (Respiratory) Rare WBC's     Gram Stain (Respiratory) No organisms seen    Blood culture [[id number]]  (Abnormal) Collected: 11/09/22 0337    Order Status: Completed Specimen: Blood from Antecubital, Left Updated: 11/11/22 1135     Blood Culture, Routine Gram stain peds bottle: Gram positive cocci in clusters resembling Staph      Gram positive cocci in chains resembling Strep      Results called to and read back by:Lila Panda RN 11/09/2022  20:00      STAPHYLOCOCCUS AUREUS  ID consult required at [place] and [facility] locations.  For susceptibility see order # [id number]      Narrative:      Please draw 10 minutes apart, from 2 separate venous sites.    Blood culture [[id number]]  (Abnormal)  (Susceptibility) Collected: 11/09/22 0337    Order Status: Completed Specimen: Blood Updated: 11/11/22 1133     Blood Culture, Routine Gram stain nohemi bottle: Gram positive cocci in chains resembling Strep      Results called to and read back by:Alexus Kang RN 11/09/2022  17:02      Gram stain aer bottle: Gram positive cocci in clusters resembling Staph      Results called to and read back by:Alexus Kang RN 11/09/2022  18:41      Gram stain aer bottle: Gram positive cocci in chains resembling Strep      STREPTOCOCCUS AGALACTIAE (GROUP B)  Beta-hemolytic streptococci are routinely susceptible to   penicillins,cephalosporins and carbapenems.         STAPHYLOCOCCUS AUREUS  ID consult required at [facility] [facility].

## 2022-11-17 NOTE — PROGRESS NOTES
Pharmacist Renal Dose Adjustment Note    Jose Luis Rendon is a 67 y.o. male being treated with the medication meropenem    Patient Data:    Vital Signs (Most Recent):  Temp: 100 °F (37.8 °C) (11/16/22 2143)  Pulse: (!) 50 (11/16/22 2143)  Resp: (!) 24 (11/16/22 2143)  BP: (!) 101/44 (11/16/22 2143)  SpO2: (!) 89 % (11/16/22 2143)   Vital Signs (72h Range):  Temp:  [94.1 °F (34.5 °C)-102.2 °F (39 °C)]   Pulse:  []   Resp:  [9-36]   BP: ()/(36-75)   SpO2:  [89 %-100 %]      Recent Labs   Lab 11/15/22  2316 11/16/22  0420 11/16/22  1443   CREATININE 4.1* 3.8* 3.1*     Serum creatinine: 3.1 mg/dL (H) 11/16/22 1443  Estimated creatinine clearance: 25.6 mL/min (A)    Medication:meropenem dose: 500mg frequency daily will be changed to medication:meropenem dose:1g frequency:q12    Pharmacist's Name: Derrick Rios  Pharmacist's Extension: [phone]

## 2022-11-17 NOTE — OP NOTE
Arterial Line placed for hypotension via R. Femoral  Temp. Pacer via R. Femoral vein, successfully place.

## 2022-11-17 NOTE — PROVIDER PROGRESS NOTES - EMERGENCY DEPT.
Encounter Date: 11/7/2022    ED Physician Progress Notes            Called to bedside for death pronouncement.  Patient lying in bed, unresponsive, connected to ventilator.  Ventilator paused. No spontaneous respirations or breath sounds.   No cardiac activity. No central pulses.  Pupils fixed and dilated. No response to pain.    Patient pronounced dead at 01:27.

## 2022-11-17 NOTE — CARE UPDATE
Called for severe bradycardia around 1930. Afib rhythm reported, HR 29 lowest. Pressors started, consent obtained via telephone for Pacemaker.

## 2022-11-17 NOTE — PROGRESS NOTES
68 yo male here on mechanical ventilation and now in shock.   MSSA bacteriemia on cefazolin now.     Pt noted to be in robert arrest w/ bradycardia.   Bedside team was managing the pt with the Physician there.     All sedation including precedex and propofol are held now.   Pt was given Atropine 0.5mg IV x 1.   Amiodarone gtt also asked to d/c.     Now pt is on dopamine gtt w/ epi 1mg IV x 1 given, epi gtt can also be added if needed.     HR and BP has improved now.     Pt is already on stress dose steroids w/ hydrocortisone 100mg IV q8h.     Overall prognosis is guarded.   D/w bedside staff in detail.     Please call for further assistance.     Follow up note:  - Comfort care measures have initiated by the bedside team after discussion with the family.   IV Ativan PRN added for anxiety.

## 2022-11-17 NOTE — PLAN OF CARE
I was called for severe bradycardia, heart rate in the 20s  EKG showed afib rhythm and RBBB.  Patient was given atropine and started on Dopamine  Pacer via right femoral vein was placed by the cardiology  Patient currently on maximum dose of Dopamine, epinephrine and levophed  Antibiotics coverage was broaden to meropenem and vancomycin given temperature spike and worsening leucocytosis. Patient continue to have very wide pulse pressure, requiring multiple pressors. Concern about mechanical complication from the AV endocarditis discussed with the cardiology team.     Son, Mr. Jose Luis Rendon updated about the events. Mr. Amaya is critically sick, we are all do all we can but I am afraid he might not make it. I encouraged him to come and see his father tonight.    Son and his wife presented shortly to see their father. The possibility of mechanical complication from the infective endocarditis which generally require surgical procedure discussed with the son. The son discloses no to surgical procedures.     Given his poor prognosis, they will like to withdraw care and focus on comfort care. Primary team informed.

## 2022-11-18 LAB — BACTERIA SPEC AEROBE CULT: ABNORMAL

## 2022-11-19 LAB
BACTERIA BLD CULT: NORMAL
BACTERIA BLD CULT: NORMAL
BACTERIA SPEC AEROBE CULT: ABNORMAL
BACTERIA SPEC AEROBE CULT: ABNORMAL
GRAM STN SPEC: ABNORMAL

## 2022-11-20 NOTE — ASSESSMENT & PLAN NOTE
Patient with acute kidney injury likely due to acute tubular necrosis NADIYA is currently worsening. Labs reviewed- Renal function/electrolytes with Estimated Creatinine Clearance: 17.3 mL/min (A) (based on SCr of 4.6 mg/dL (H)). according to latest data. Monitor urine output and serial BMP and adjust therapy as needed. Avoid nephrotoxins and renally dose meds for GFR listed above.   - likely due to septic shock/DKA  - BP support with pressors as needed  - IV lasix trial without improvement  - Nephrology consultation:  Initiated dialysis 11/12, well tolerated  - additional IV Lasix trial on 11/13 unsuccessful  - Remains oliguric  - CRRT

## 2022-11-20 NOTE — DISCHARGE SUMMARY
[facility] Medicine  Discharge Summary      Patient Name: Jose Luis Rendon  MRN: [id number]  KWABENA: [id number]  Patient Class: IP- Inpatient  Admission Date: 11/7/2022  Hospital Length of Stay: 10 days  Discharge Date and Time: 11/17/2022  4:15 AM  Attending Physician: No att. providers found   Discharging Provider: Martin Grady MD  Primary Care Provider: Deric Armstrong MD    Primary Care Team: Networked reference to record PCT     HPI:   67M with PMH of COPD, HTN, CVA, Bell's Palsy presents with c/o sob, chest pain, nausea, vomiting x 4 days. Pt first noticed chest pain after picking up a family member off the floor. He also has had multiple episodes of N/V with retching which has exacerbated symptoms. The pain radiates around right side to his back. Also has SOB which pt states is chronic but has progressively worsened recently. Denies fever, chills, palpitations, diaphoresis, abd pain, diarrhea, dysuria. No recent fall/trauma. Workup in ED remarkable for hyperglycemia, HAGMA, elevated beta hydroxy. CXR showing Subtle bibasilar interstitial opacities, no focal consolidation. No known history of CHF or DM. Patient will be admitted to hospital medicine service for further management.      Procedure(s) (LRB):  INSERTION, PACEMAKER, TEMPORARY TRANSVENOUS (N/A)  Placement, Arterial Line      Hospital Course:   See individual problem list.       Goals of Care Treatment Preferences:  Code Status: DNR      Consults:   Consults (From admission, onward)        Status Ordering Provider     Inpatient consult to Orthopedic Surgery  Once        Provider:  (Not yet assigned)    Completed SANDRO HUTSON     Inpatient consult to Nephrology-[facility] (Nolberto Walters, Rinku)  Once        Provider:  (Not yet assigned)    Completed SANDRO HUTSON     [facility]  [facility]        Provider:  (Not yet assigned)    Completed SANDRO HUTSON     Inpatient  consult to Infectious Diseases  Once        Provider:  (Not yet assigned)    Completed SANDRO HUTSON     Inpatient consult to Registered Dietitian/Nutritionist  Once        Provider:  (Not yet assigned)    Completed ORLIN ARRIOLA          * Septic shock  - likely due to post-viral PNA with staph bacteremia and endocarditis  - likely distributive component due to sedation  - wean off levophed  - continue IV abx  - repeat BCx for clearance  - worsening leukocytosis and pressor requirements  - joint evaluation off sedation for signs of tenderness/agitation  - Patient bradycardic in 20s overnight. Amio gtt discontinued and received atropine. Placed on transcutaneous PM and started epi/dopamine gtt added to levo gtt. Patient then taken to cath lab for TVP placement. Exam showed absent CN reflexes. GOC discussion with family who requested comfort care measures. Life-sustaining measures withdrawn and patient subsequently . Patient pronounced dead at 01:27 22.    Comfort measures only status        Bacteremia        Infective endocarditis of aortic valve        Bacteremia due to group B Streptococcus        Abscess of bursa of right elbow  Culture growing staph  Ortho consulted for I&D      Problem with dialysis access  -issue with line today   -ICU team to replaced today and will dialyze patient      Acute bacterial endocarditis  - SANTA with significant vegetations  - continue IV antibiotics  - serial cultures for clearance  - anticipate 6 weeks of IV antibiotics from date of culture clearance with cefazolin  - ID following  - May require repeat TTE      Chest pain of uncertain etiology        Acute renal failure with tubular necrosis  Patient with acute kidney injury likely due to acute tubular necrosis NADIYA is currently worsening. Labs reviewed- Renal function/electrolytes with Estimated Creatinine Clearance: 17.3 mL/min (A) (based on SCr of 4.6 mg/dL (H)). according to latest data. Monitor urine  output and serial BMP and adjust therapy as needed. Avoid nephrotoxins and renally dose meds for GFR listed above.   - likely due to septic shock/DKA  - BP support with pressors as needed  - IV lasix trial without improvement  - Nephrology consultation:  Initiated dialysis 11/12, well tolerated  - additional IV Lasix trial on 11/13 unsuccessful  - Remains oliguric  - CRRT    MSSA bacteremia  - blood cultures growing staph (appears to be MSSA based off PCR) and strep  - repeat blood cultures for clearance  - admission TTE without vegetation; SANTA with vegetation  - on BSABx; de-escalated to cefazolin  - ID consult; will need 6 weeks IV antibiotics from date of culture clearance      Atrial fibrillation with RVR  Patient with Paroxysmal (<7 days) atrial fibrillation which is controlled currently with Amiodarone. Patient is currently in sinus rhythm.[id number] Score: The patient doesn't have any registry metric data available.  Anticoagulation indicated. Anticoagulation done with lovenox.  -Amiodarone    Endotracheally intubated  - worsening respiratory failure  - intubated 11/9  - Pulm following      Bacterial pneumonia  - suspect due to post-viral PNA with staph  - COVID and flu negative on admission  - on BSABx for now given acute decompensation  - empiric tamiflu stopped   - staph bacteremia, switched antibiotics to cefazolin    Centrilobular emphysema  -noted on imaging  -needs PFTs as outpatient and pulmonology follow-up      COPD with acute exacerbation  As above-nightly BiPAP for in addition to prednisone, azithromycin  -decompensation prompting intubation  -stop steroids; on broad-spectrum antibiotics  -suspect may have staph pneumonia    DKA (diabetic ketoacidosis)  Workup in ED remarkable for hyperglycemia, HAGMA, elevated beta hydroxy.   New diagnosis of DM  DKA protocol  -A1c greater than 14; will need insulin at time of discharge  -reopened gap; transitioned back to insulin gtt  - now with concern for  hypoglycemia; possibly due to insulin stacking  -adjust to detemir 10 units b.i.d.    Acute hypoxemic respiratory failure  Likely 2/2 COPD  CXR showing subtle bibasilar interstitial opacities.  No focal consolidation  CT chest 10/2021: Centrilobular emphysema with an upper lobe predominance.  Bibasilar crackles on exam  Standing duonebs  Started on azithromycin and prednisone; fevers overnight, broadened antibiotics and repeating flu/RSV/COVID  -now with bacteremia  Supp O2 PRN to keep O2 sat 88-92%  TTE  Strict I&O  Fluid removal with CRRT     Benign essential HTN  - hold amlodipine      Final Active Diagnoses:    Diagnosis Date Noted POA    PRINCIPAL PROBLEM:  Septic shock [A41.9, R65.21] 2022 No    Comfort measures only status [Z51.5] 2022 Not Applicable    Infective endocarditis of aortic valve [I33.0] 11/15/2022 Yes    Bacteremia [R78.81] 11/15/2022 Yes    Problem with dialysis access [T82.898A] 2022 No    Abscess of bursa of right elbow [M71.021] 2022 Yes    Bacteremia due to group B Streptococcus [R78.81, B95.1] 2022 Yes    Chest pain of uncertain etiology [R07.9] 2022 Yes    Acute bacterial endocarditis [I33.0] 2022 Yes    Atrial fibrillation with RVR [I48.91] 11/10/2022 No    MSSA bacteremia [R78.81, B95.61] 11/10/2022 Yes    Acute renal failure with tubular necrosis [N17.0] 11/10/2022 No    Bacterial pneumonia [J15.9] 2022 Yes    Endotracheally intubated [Z97.8] 2022 No    Acute hypoxemic respiratory failure [J96.01] 2022 Yes    DKA (diabetic ketoacidosis) [E11.10] 2022 Yes    COPD with acute exacerbation [J44.1] 2022 No    Centrilobular emphysema [J43.2] 2022 Yes    Benign essential HTN [I10] 2018 Yes      Problems Resolved During this Admission:    Diagnosis Date Noted Date Resolved POA    Left-sided chest pain [R07.9] 2022 11/10/2022 Yes       Discharged Condition:     Disposition:      Follow Up:    Patient Instructions:   No discharge procedures on file.    Significant Diagnostic Studies: Labs: All labs within the past 24 hours have been reviewed    Pending Diagnostic Studies:     None         Medications:  None    Indwelling Lines/Drains at time of discharge:   Lines/Drains/Airways     Airway  Duration                Airway - Non-Surgical 22 0804 Endotracheal Tube 10 days                Time spent on the discharge of patient: 30 minutes    Critical care time spent on the evaluation and treatment of severe organ dysfunction, review of pertinent labs and imaging studies, discussions with consulting providers and discussions with patient/family: 45 minutes.     Martin Grady MD  Department of Hospital Medicine  [facility] Intensive Care

## 2022-11-20 NOTE — ASSESSMENT & PLAN NOTE
Patient with Paroxysmal (<7 days) atrial fibrillation which is controlled currently with Amiodarone. Patient is currently in sinus rhythm.[id number] Score: The patient doesn't have any registry metric data available.  Anticoagulation indicated. Anticoagulation done with lovenox.  -Amiodarone

## 2022-11-20 NOTE — ASSESSMENT & PLAN NOTE
- likely due to post-viral PNA with staph bacteremia and endocarditis  - likely distributive component due to sedation  - wean off levophed  - continue IV abx  - repeat BCx for clearance  - worsening leukocytosis and pressor requirements  - joint evaluation off sedation for signs of tenderness/agitation  - Patient bradycardic in 20s overnight. Amio gtt discontinued and received atropine. Placed on transcutaneous PM and started epi/dopamine gtt added to levo gtt. Patient then taken to cath lab for TVP placement. Exam showed absent CN reflexes. GOC discussion with family who requested comfort care measures. Life-sustaining measures withdrawn and patient subsequently . Patient pronounced dead at 01:27 22.

## 2022-11-21 PROBLEM — M70.21 OLECRANON BURSITIS OF RIGHT ELBOW: Status: ACTIVE | Noted: 2022-11-21

## 2022-11-22 LAB — BACTERIA SPEC ANAEROBE CULT: NORMAL

## 2023-01-03 NOTE — PROGRESS NOTES
Patient is a 35 YO male admitted for intractable nausea and vomiting. Discharge order received and patient informed. Discharge instructions discussed with patient at the bedside. Opportunity for questions was offered and clarifications provided as required. Patient verbalized understanding of discharge instructions and medications. Copy of discharge paperwork provided to patient for reference. Peripheral IV access discontinued  and belongings returned to patient's prior to discharge. Patient was in stable condition during shift and at discharge. Patient was safely discharged from the unit in the care of Hospital to Home transport personnel. [facility] Medicine  Progress Note    Patient Name: Jose Luis Rendon  MRN: [id number]  Patient Class: IP- Inpatient   Admission Date: 11/7/2022  Length of Stay: 5 days  Attending Physician: Jermain Jackson, *  Primary Care Provider: Deric Armstrong MD        Subjective:     Principal Problem:MSSA bacteremia        HPI:  67M with PMH of COPD, HTN, CVA, Bell's Palsy presents with c/o sob, chest pain, nausea, vomiting x 4 days. Pt first noticed chest pain after picking up a family member off the floor. He also has had multiple episodes of N/V with retching which has exacerbated symptoms. The pain radiates around right side to his back. Also has SOB which pt states is chronic but has progressively worsened recently. Denies fever, chills, palpitations, diaphoresis, abd pain, diarrhea, dysuria. No recent fall/trauma. Workup in ED remarkable for hyperglycemia, HAGMA, elevated beta hydroxy. CXR showing Subtle bibasilar interstitial opacities, no focal consolidation. No known history of CHF or DM. Patient will be admitted to hospital medicine service for further management.      Overview/Hospital Course:  No notes on file    Interval History:  Briefly on pressors overnight, still without improvement in renal function.  SANTA yesterday revealed aortic valve vegetation.  He has not yet cleared blood cultures.  Discussed with Nephrology and will likely need to initiate dialysis today.  Discussed with the patient's family at the bedside and answered all questions.    Review of Systems   Unable to perform ROS: Intubated   Objective:     Vital Signs (Most Recent):  Temp: 97.1 °F (36.2 °C) (11/12/22 1145)  Pulse: 70 (11/12/22 1100)  Resp: 20 (11/12/22 1100)  BP: (!) 115/59 (11/12/22 1100)  SpO2: 96 % (11/12/22 1100)   Vital Signs (24h Range):  Temp:  [96.5 °F (35.8 °C)-98.5 °F (36.9 °C)] 97.1 °F (36.2 °C)  Pulse:  [] 70  Resp:  [18-42] 20  SpO2:  [92 %-99 %] 96 %  BP: ()/(50-72) 115/59     Weight:  94.5 kg (208 lb 5.4 oz)  Body mass index is 29.89 kg/m².    Intake/Output Summary (Last 24 hours) at 11/12/2022 1158  Last data filed at 11/12/2022 0700  Gross per 24 hour   Intake 1016.41 ml   Output 257 ml   Net 759.41 ml        Physical Exam  Constitutional:       Appearance: He is ill-appearing. He is not toxic-appearing.      Interventions: He is sedated and intubated.   HENT:      Head: Normocephalic and atraumatic.      Mouth/Throat:      Mouth: Mucous membranes are moist.   Eyes:      Conjunctiva/sclera: Conjunctivae normal.      Pupils: Pupils are equal, round, and reactive to light.   Cardiovascular:      Rate and Rhythm: Normal rate and regular rhythm.      Heart sounds: Normal heart sounds.   Pulmonary:      Effort: Pulmonary effort is normal. He is intubated.      Breath sounds: Rales (bibasilar) present.   Abdominal:      General: There is no distension.      Palpations: Abdomen is soft.      Tenderness: There is no abdominal tenderness. There is no guarding.   Musculoskeletal:         General: Normal range of motion.      Cervical back: Normal range of motion and neck supple.      Right lower leg: Edema present.      Left lower leg: Edema present.   Skin:     General: Skin is warm and dry.   Neurological:      General: No focal deficit present.      Sensory: No sensory deficit.      Comments: Facial droop   Psychiatric:      Comments: Unable to assess       Significant Labs: All pertinent labs within the past 24 hours have been reviewed.    Significant Imaging: I have reviewed all pertinent imaging results/findings within the past 24 hours.      Assessment/Plan:      * MSSA bacteremia  - blood cultures growing staph (appears to be MSSA based off PCR) and strep  - repeat blood cultures for clearance  - admission TTE without vegetation; SANTA with vegetation  - on BSABx; de-escalated to cefazolin  - ID consult; will need 6 weeks IV antibiotics from date of culture clearance      Acute bacterial  endocarditis  - SANTA with significant vegetations  - continue IV antibiotics  - serial cultures for clearance  - anticipate 6 weeks of IV antibiotics from date of culture clearance with cefazolin  - ID following      Acute renal failure with acute tubular necrosis superimposed on stage 3a chronic kidney disease  Patient with acute kidney injury likely due to acute tubular necrosis NADIYA is currently worsening. Labs reviewed- Renal function/electrolytes with Estimated Creatinine Clearance: 12.6 mL/min (A) (based on SCr of 6.4 mg/dL (H)). according to latest data. Monitor urine output and serial BMP and adjust therapy as needed. Avoid nephrotoxins and renally dose meds for GFR listed above.   - likely due to septic shock/DKA  - BP support with pressors as needed  - worsening failure today  - IV lasix trial without improvement  - Nephrology consultation: Plan to initiate dialysis today    Atrial fibrillation with RVR  Patient with Paroxysmal (<7 days) atrial fibrillation which is controlled currently with Amiodarone. Patient is currently in sinus rhythm.[id number] Score: 2.  Anticoagulation indicated. Anticoagulation done with lovenox.  -transitioning amiodarone to p.o.    Septic shock  - likely due to post-viral PNA with staph bacteremia and endocarditis  - likely distributive component due to sedation  - wean off levophed  - continue IV abx  - repeat BCx for clearance    Endotracheally intubated  - worsening respiratory failure  - intubated 11/9  - Pulm following      Bacterial pneumonia  - suspect due to post-viral PNA with staph  - COVID and flu negative on admission  - on BSABx for now given acute decompensation  - empiric tamiflu stopped today  - staph bacteremia, switched antibiotics to cefazolin    Centrilobular emphysema  -noted on imaging  -needs PFTs as outpatient and pulmonology follow-up      COPD with acute exacerbation  As above-nightly BiPAP for in addition to prednisone, azithromycin  -decompensation  prompting intubation  -continue steroids; on broad-spectrum antibiotics  -suspect may have staph pneumonia    DKA (diabetic ketoacidosis)  Workup in ED remarkable for hyperglycemia, HAGMA, elevated beta hydroxy.   New diagnosis of DM  DKA protocol  -A1c greater than 14; will need insulin at time of discharge  -reopened gap; transitioned back to insulin gtt  - continue detemir 30u bid    Acute hypoxemic respiratory failure  Likely 2/2 COPD  CXR showing subtle bibasilar interstitial opacities.  No focal consolidation  CT chest 10/2021: Centrilobular emphysema with an upper lobe predominance.  Bibasilar crackles on exam  Standing duonebs  Started on azithromycin and prednisone; fevers overnight, broadened antibiotics and repeating flu/RSV/COVID  Supp O2 PRN to keep O2 sat 88-92%  TTE  Strict I&O  Hold IV fluids    Benign essential HTN  - hold amlodipine      VTE Risk Mitigation (From admission, onward)         Ordered     enoxaparin injection 30 mg  Daily         11/10/22 1155     IP VTE LOW RISK PATIENT  Once         11/07/22 1231     Place sequential compression device  Until discontinued         11/07/22 1231                Discharge Planning   BELINDA:      Code Status: Full Code   Is the patient medically ready for discharge?:     Reason for patient still in hospital (select all that apply): Patient unstable and Treatment  Discharge Plan A: Long-term acute care facility (LTAC)   Discharge Delays: None known at this time        Critical care time spent on the evaluation and treatment of severe organ dysfunction, review of pertinent labs and imaging studies, discussions with consulting providers and discussions with patient/family: 50 minutes.      Jermain Jackson MD  Department of Hospital Medicine   [place] - Intensive Care
